# Patient Record
Sex: MALE | Race: WHITE | Employment: FULL TIME | URBAN - METROPOLITAN AREA
[De-identification: names, ages, dates, MRNs, and addresses within clinical notes are randomized per-mention and may not be internally consistent; named-entity substitution may affect disease eponyms.]

---

## 2017-01-12 ENCOUNTER — TELEPHONE (OUTPATIENT)
Dept: FAMILY MEDICINE CLINIC | Age: 52
End: 2017-01-12

## 2017-01-12 NOTE — TELEPHONE ENCOUNTER
Patient came into office asking for the sea sickness patch   Leaving on cruise in     [place] on [address]

## 2017-02-23 ENCOUNTER — OFFICE VISIT (OUTPATIENT)
Dept: FAMILY MEDICINE CLINIC | Age: 52
End: 2017-02-23

## 2017-02-23 VITALS
SYSTOLIC BLOOD PRESSURE: 118 MMHG | BODY MASS INDEX: 37.32 KG/M2 | TEMPERATURE: 98.1 F | HEART RATE: 72 BPM | OXYGEN SATURATION: 100 % | WEIGHT: 266.56 LBS | RESPIRATION RATE: 18 BRPM | DIASTOLIC BLOOD PRESSURE: 78 MMHG | HEIGHT: 71 IN

## 2017-02-23 DIAGNOSIS — M79.644 THUMB PAIN, RIGHT: Primary | ICD-10-CM

## 2017-02-23 RX ORDER — DICLOFENAC SODIUM 10 MG/G
GEL TOPICAL 4 TIMES DAILY
Qty: 100 G | Refills: 1 | Status: ON HOLD | OUTPATIENT
Start: 2017-02-23 | End: 2021-08-05

## 2017-02-23 NOTE — MR AVS SNAPSHOT
Visit Information Date & Time Provider Department Dept. Phone Encounter #  
 2/23/2017  3:45 PM Romario Fenton MD [address] [facility] [phone] [id number] Follow-up Instructions Return if symptoms worsen or fail to improve. Upcoming Health Maintenance Date Due Pneumococcal 19-64 Highest Risk (1 of 3 - PCV13) 3/4/1985 DTaP/Tdap/Td series (1 - Tdap) 3/4/1987 FOBT Q 1 YEAR AGE 50-75 3/4/2016 Allergies as of 2/23/2017  Review Complete On: 2/23/2017 By: Romario Fenton MD  
  
 Severity Noted Reaction Type Reactions Heparin (Bovine)  12/22/2011    Other (comments) Questionable per patient ,back lesions. Current Immunizations  Reviewed on 7/1/2015 Name Date Influenza Vaccine 10/21/2013 Not reviewed this visit You Were Diagnosed With   
  
 Codes Comments Thumb pain, right    -  Primary ICD-10-CM: G28.089 ICD-9-CM: 729.5 Vitals BP  
  
  
  
  
  
 118/78 (BP 1 Location: Left arm, BP Patient Position: Sitting) Vitals History BMI and BSA Data Body Mass Index Body Surface Area  
 37.18 kg/m 2 2.46 m 2 Preferred Pharmacy Pharmacy Name Phone [facility] [address] [place] [address] [phone] Your Updated Medication List  
  
   
This list is accurate as of: 2/23/17  4:39 PM.  Always use your most recent med list.  
  
  
  
  
 clotrimazole-betamethasone topical cream  
Commonly known as:  Cisco Drones Apply  to affected area two (2) times a day. diclofenac 1 % Gel Commonly known as:  VOLTAREN Apply  to affected area four (4) times daily. levocetirizine 5 mg tablet Commonly known as:  Ilda Ala Take 1 Tab by mouth daily. sildenafil citrate 100 mg tablet Commonly known as:  VIAGRA Take 1 Tab by mouth as needed. zolpidem 10 mg tablet Commonly known as:  AMBIEN  
 Take 1 Tab by mouth nightly as needed for Sleep. Ok to fill 11/10/2015 Prescriptions Sent to Pharmacy Refills  
 diclofenac (VOLTAREN) 1 % gel 1 Sig: Apply  to affected area four (4) times daily. Class: Normal  
 Pharmacy: [facility] [address] [place] [address]y [address] [place] [address] #: [phone] Route: Topical  
  
Follow-up Instructions Return if symptoms worsen or fail to improve. Introducing [facility]! Dear Cindy Garibay: Thank you for requesting a [employer] account. Our records indicate that you have previously registered for a [email or web address] account but its currently inactive. Please call our Multi[employer]ona[employer] support line at [phone]. Additional Information If you have questions, please visit the Frequently Asked Questions section of the [email or web address] website at [email or web address] Remember, [email or web address] is NOT to be used for urgent needs. For medical emergencies, dial 911. Now available from your iPhone and Android! Please provide this summary of care documentation to your next provider. Your primary care clinician is listed as GLORIA LOCKETT. If you have any questions after today's visit, please call [phone].

## 2017-02-23 NOTE — PROGRESS NOTES
1. Have you been to the ER, urgent care clinic since your last visit? Hospitalized since your last visit? No    2. Have you seen or consulted any other health care providers outside of the [address] [place] [address] since your last visit? Include any pap smears or colon screening. No   Chief Complaint   Patient presents with    Lesion     dark spots on both feet    Wrist Pain     right wrist pain       Chief Complaint   Patient presents with    Lesion     dark spots on both feet    Wrist Pain     right wrist pain     he is a 49y.o. year old male who presents for evalution. Reviewed PmHx, RxHx, FmHx, SocHx, AllgHx and updated and dated in the chart. Patient Active Problem List    Diagnosis    Insomnia    POEMS syndrome    Neuropathy associated with endocrine disorder       Review of Systems - negative except as listed above in the HPI    Objective:     Vitals:    02/23/17 1624   BP: 118/78   Pulse: 72   Resp: 18   Temp: 98.1 °F (36.7 °C)   TempSrc: Oral   SpO2: 100%   Weight: 266 lb 9 oz (120.9 kg)   Height: 5' 11\" (1.803 m)     Physical Examination: General appearance - alert, well appearing, and in no distress  R base of thumb tender to palp and rom, neg foot exam but for varicose veins      Assessment/ Plan:   Candelaria Cosme was seen today for lesion and wrist pain. Diagnoses and all orders for this visit:    Thumb pain, right  -     diclofenac (VOLTAREN) 1 % gel; Apply  to affected area four (4) times daily. Follow-up Disposition:  Return if symptoms worsen or fail to improve. I have discussed the diagnosis with the patient and the intended plan as seen in the above orders. The patient understands and agrees with the plan. The patient has received an after-visit summary and questions were answered concerning future plans.      Medication Side Effects and Warnings were discussed with patient  Patient Labs were reviewed and or requested:  Patient Past Records were reviewed and or requested    Trish Us gM Mills M.D. There are no Patient Instructions on file for this visit.

## 2017-03-23 ENCOUNTER — DOCUMENTATION ONLY (OUTPATIENT)
Dept: FAMILY MEDICINE CLINIC | Age: 52
End: 2017-03-23

## 2017-03-28 ENCOUNTER — DOCUMENTATION ONLY (OUTPATIENT)
Dept: FAMILY MEDICINE CLINIC | Age: 52
End: 2017-03-28

## 2017-03-28 NOTE — PROGRESS NOTES
PA for diclofenac 1% gel approved from 2/21/17 thru 3/23/18, copy faxed to pharmacy , LM on VM of approval, and copy placed in scan folder to be scanned to chart.

## 2017-05-02 ENCOUNTER — OFFICE VISIT (OUTPATIENT)
Dept: FAMILY MEDICINE CLINIC | Age: 52
End: 2017-05-02

## 2017-05-02 VITALS
HEIGHT: 71 IN | OXYGEN SATURATION: 97 % | TEMPERATURE: 98 F | SYSTOLIC BLOOD PRESSURE: 114 MMHG | DIASTOLIC BLOOD PRESSURE: 73 MMHG | BODY MASS INDEX: 37.8 KG/M2 | WEIGHT: 270 LBS | HEART RATE: 66 BPM | RESPIRATION RATE: 18 BRPM

## 2017-05-02 DIAGNOSIS — R63.5 WEIGHT GAIN: ICD-10-CM

## 2017-05-02 DIAGNOSIS — F98.8 ADD (ATTENTION DEFICIT DISORDER): Primary | ICD-10-CM

## 2017-05-02 RX ORDER — CARBINOXAMINE MALEATE 4 MG/1
4 TABLET ORAL 2 TIMES DAILY
Qty: 60 TAB | Refills: 2 | Status: SHIPPED | OUTPATIENT
Start: 2017-05-02 | End: 2017-05-30 | Stop reason: SDUPTHER

## 2017-05-02 NOTE — PROGRESS NOTES
Patient he for med eval. Dx adhd by [facility],   1. Have you been to the ER, urgent care clinic since your last visit? Hospitalized since your last visit? No    2. Have you seen or consulted any other health care providers outside of the [address] [place] [address] since your last visit? Include any pap smears or colon screening. No       Chief Complaint   Patient presents with    Medication Evaluation     adhd had eval with psychologist Chetna Mcnair  [phone],  [phone] fax     he is a 47y.o. year old male who presents for evalution. Reviewed PmHx, RxHx, FmHx, SocHx, AllgHx and updated and dated in the chart. Patient Active Problem List    Diagnosis    Insomnia    POEMS syndrome    Neuropathy associated with endocrine disorder ([facility] [address]       Review of Systems - negative except as listed above in the HPI    Objective:     Vitals:    05/02/17 1533   BP: 114/73   Pulse: 66   Resp: 18   Temp: 98 °F (36.7 °C)   SpO2: 97%   Weight: 270 lb (122.5 kg)   Height: 5' 11\" (1.803 m)     Physical Examination: General appearance - alert, well appearing, and in no distress    Assessment/ Plan:   Minerva Marks was seen today for medication evaluation. Diagnoses and all orders for this visit:    ADD (attention deficit disorder)  -     Lisdexamfetamine (VYVANSE) 40 mg capsule; Take 1 Cap (40 mg total) by mouth daily. Max Daily Amount: 40 mg  -start rx  -tested at psych    Other orders  -     carbinoxamine maleate (PALGIC) 4 mg tab; Take 4 mg by mouth two (2) times a day. Follow-up Disposition:  Return in about 1 month (around 6/2/2017) for add. I have discussed the diagnosis with the patient and the intended plan as seen in the above orders. The patient understands and agrees with the plan. The patient has received an after-visit summary and questions were answered concerning future plans.      Medication Side Effects and Warnings were discussed with patient  Patient Labs were reviewed and or requested:  Patient Past Records were reviewed and or requested    Aubrey Pyle M.D. There are no Patient Instructions on file for this visit.

## 2017-05-02 NOTE — MR AVS SNAPSHOT
Visit Information Date & Time Provider Department Dept. Phone Encounter #  
 5/2/2017  3:35 PM Carolyn Candelario MD [address] [facility] [phone] [id number] Follow-up Instructions Return in about 1 month (around 6/2/2017) for add. Upcoming Health Maintenance Date Due Pneumococcal 19-64 Highest Risk (1 of 3 - PCV13) 3/4/1985 DTaP/Tdap/Td series (1 - Tdap) 3/4/1987 FOBT Q 1 YEAR AGE 50-75 3/4/2016 INFLUENZA AGE 9 TO ADULT 8/1/2017 Allergies as of 5/2/2017  Review Complete On: 5/2/2017 By: Carolyn Candelario MD  
  
 Severity Noted Reaction Type Reactions Heparin (Bovine)  12/22/2011    Other (comments) Questionable per patient ,back lesions. Current Immunizations  Reviewed on 7/1/2015 Name Date Influenza Vaccine 10/21/2013 Not reviewed this visit You Were Diagnosed With   
  
 Codes Comments ADD (attention deficit disorder)    -  Primary ICD-10-CM: F98.8 ICD-9-CM: 314.00 Vitals BP Pulse Temp Resp Height(growth percentile) Weight(growth percentile) 114/73 66 98 °F (36.7 °C) 18 5' 11\" (1.803 m) 270 lb (122.5 kg) SpO2 BMI Smoking Status 97% 37.66 kg/m2 Never Smoker Vitals History BMI and BSA Data Body Mass Index Body Surface Area  
 37.66 kg/m 2 2.48 m 2 Preferred Pharmacy Pharmacy Name Phone [facility] [address] [place] [address], [address] [place] [address] [phone] Your Updated Medication List  
  
   
This list is accurate as of: 5/2/17  3:47 PM.  Always use your most recent med list.  
  
  
  
  
 carbinoxamine maleate 4 mg Tab Commonly known as:  Crow Willett 157 Take 4 mg by mouth two (2) times a day. clotrimazole-betamethasone topical cream  
Commonly known as:  Oral Mage Apply  to affected area two (2) times a day. diclofenac 1 % Gel Commonly known as:  VOLTAREN Apply  to affected area four (4) times daily. levocetirizine 5 mg tablet Commonly known as:  Florentino Tonny Take 1 Tab by mouth daily. Lisdexamfetamine 40 mg capsule Commonly known as:  VYVANSE Take 1 Cap (40 mg total) by mouth daily. Max Daily Amount: 40 mg  
  
 sildenafil citrate 100 mg tablet Commonly known as:  VIAGRA Take 1 Tab by mouth as needed. zolpidem 10 mg tablet Commonly known as:  AMBIEN Take 1 Tab by mouth nightly as needed for Sleep. Ok to fill 11/10/2015 Prescriptions Printed Refills Lisdexamfetamine (VYVANSE) 40 mg capsule 0 Sig: Take 1 Cap (40 mg total) by mouth daily. Max Daily Amount: 40 mg  
 Class: Print Route: Oral  
  
Prescriptions Sent to Pharmacy Refills  
 carbinoxamine maleate (PALGIC) 4 mg tab 2 Sig: Take 4 mg by mouth two (2) times a day. Class: Normal  
 Pharmacy: [facility] [address] [place] [address] [address] [place] [address] #: [phone] Route: Oral  
  
Follow-up Instructions Return in about 1 month (around 6/2/2017) for add. Introducing [facility]! Dear Lena Manuel: Thank you for requesting a Cleverbug account. Our records indicate that you have previously registered for a Cleverbug account but its currently inactive. Please call our Cleverbug support line at [phone]. Additional Information If you have questions, please visit the Frequently Asked Questions section of the Cleverbug website at [email or web address] Campaign [email or web address]/The Campaign [email or web address]/. Remember, Cleverbug is NOT to be used for urgent needs. For medical emergencies, dial 911. Now available from your iPhone and Android! Please provide this summary of care documentation to your next provider. Your primary care clinician is listed as GLORIA LOCKETT. If you have any questions after today's visit, please call [phone].

## 2017-05-03 ENCOUNTER — TELEPHONE (OUTPATIENT)
Dept: FAMILY MEDICINE CLINIC | Age: 52
End: 2017-05-03

## 2017-05-03 LAB
ALBUMIN SERPL-MCNC: 3.5 G/DL (ref 3.5–5.5)
ALBUMIN/GLOB SERPL: 1.4 {RATIO} (ref 1.2–2.2)
ALP SERPL-CCNC: 94 IU/L (ref 39–117)
ALT SERPL-CCNC: 22 IU/L (ref 0–44)
AST SERPL-CCNC: 25 IU/L (ref 0–40)
BASOPHILS # BLD AUTO: 0 X10E3/UL (ref 0–0.2)
BASOPHILS NFR BLD AUTO: 0 %
BILIRUB SERPL-MCNC: 0.7 MG/DL (ref 0–1.2)
BUN SERPL-MCNC: 14 MG/DL (ref 6–24)
BUN/CREAT SERPL: 17 (ref 9–20)
CALCIUM SERPL-MCNC: 8.7 MG/DL (ref 8.7–10.2)
CHLORIDE SERPL-SCNC: 104 MMOL/L (ref 96–106)
CO2 SERPL-SCNC: 24 MMOL/L (ref 18–29)
CREAT SERPL-MCNC: 0.83 MG/DL (ref 0.76–1.27)
EOSINOPHIL # BLD AUTO: 0.7 X10E3/UL (ref 0–0.4)
EOSINOPHIL NFR BLD AUTO: 10 %
ERYTHROCYTE [DISTWIDTH] IN BLOOD BY AUTOMATED COUNT: 15.2 % (ref 12.3–15.4)
GLOBULIN SER CALC-MCNC: 2.5 G/DL (ref 1.5–4.5)
GLUCOSE SERPL-MCNC: 124 MG/DL (ref 65–99)
HCT VFR BLD AUTO: 44.9 % (ref 37.5–51)
HGB BLD-MCNC: 15.3 G/DL (ref 12.6–17.7)
IMM GRANULOCYTES # BLD: 0 X10E3/UL (ref 0–0.1)
IMM GRANULOCYTES NFR BLD: 0 %
LYMPHOCYTES # BLD AUTO: 2.6 X10E3/UL (ref 0.7–3.1)
LYMPHOCYTES NFR BLD AUTO: 36 %
MCH RBC QN AUTO: 28.3 PG (ref 26.6–33)
MCHC RBC AUTO-ENTMCNC: 34.1 G/DL (ref 31.5–35.7)
MCV RBC AUTO: 83 FL (ref 79–97)
MONOCYTES # BLD AUTO: 0.7 X10E3/UL (ref 0.1–0.9)
MONOCYTES NFR BLD AUTO: 9 %
NEUTROPHILS # BLD AUTO: 3.2 X10E3/UL (ref 1.4–7)
NEUTROPHILS NFR BLD AUTO: 45 %
PLATELET # BLD AUTO: 167 X10E3/UL (ref 150–379)
POTASSIUM SERPL-SCNC: 4 MMOL/L (ref 3.5–5.2)
PROT SERPL-MCNC: 6 G/DL (ref 6–8.5)
RBC # BLD AUTO: 5.41 X10E6/UL (ref 4.14–5.8)
SODIUM SERPL-SCNC: 143 MMOL/L (ref 134–144)
TSH SERPL DL<=0.005 MIU/L-ACNC: 1.04 UIU/ML (ref 0.45–4.5)
WBC # BLD AUTO: 7.2 X10E3/UL (ref 3.4–10.8)

## 2017-05-03 NOTE — TELEPHONE ENCOUNTER
PA for Vyvanse 40 approved called pharmacy - informed $776 toward $1000 deductible- pt informed - voiced understanding

## 2017-05-04 NOTE — PROGRESS NOTES
Pt called at the number on file with no answer. Voicemail could not be left with information to call the clinic back, because there was no voicemail set up.

## 2017-05-30 ENCOUNTER — OFFICE VISIT (OUTPATIENT)
Dept: FAMILY MEDICINE CLINIC | Age: 52
End: 2017-05-30

## 2017-05-30 VITALS
OXYGEN SATURATION: 98 % | WEIGHT: 270.06 LBS | HEART RATE: 66 BPM | TEMPERATURE: 98.1 F | DIASTOLIC BLOOD PRESSURE: 79 MMHG | HEIGHT: 71 IN | RESPIRATION RATE: 20 BRPM | SYSTOLIC BLOOD PRESSURE: 112 MMHG | BODY MASS INDEX: 37.81 KG/M2

## 2017-05-30 DIAGNOSIS — D22.9 ATYPICAL MOLE: Primary | ICD-10-CM

## 2017-05-30 DIAGNOSIS — F98.8 ADD (ATTENTION DEFICIT DISORDER): ICD-10-CM

## 2017-05-30 RX ORDER — CARBINOXAMINE MALEATE 4 MG/1
4 TABLET ORAL 2 TIMES DAILY
Qty: 180 TAB | Refills: 3 | Status: SHIPPED | OUTPATIENT
Start: 2017-05-30 | End: 2019-11-27 | Stop reason: SDUPTHER

## 2017-05-30 RX ORDER — METHYLPHENIDATE HYDROCHLORIDE 40 MG/1
40 CAPSULE, EXTENDED RELEASE ORAL
Qty: 30 CAP | Refills: 0 | Status: SHIPPED | OUTPATIENT
Start: 2017-05-30 | End: 2017-07-05 | Stop reason: SDUPTHER

## 2017-05-30 NOTE — PROGRESS NOTES
1. Have you been to the ER, urgent care clinic since your last visit? Hospitalized since your last visit? No    2. Have you seen or consulted any other health care providers outside of the [facility] since your last visit? Include any pap smears or colon screening. No   Chief Complaint   Patient presents with    Attention Deficit Disorder     ADD -taking Vyvanse 40 mg (dry mouth)       Has mole on nose not healing for 1 year      Chief Complaint   Patient presents with    Attention Deficit Disorder     ADD -taking Vyvanse 40 mg (dry mouth)     he is a 47y.o. year old male who presents for evalution. Reviewed PmHx, RxHx, FmHx, SocHx, AllgHx and updated and dated in the chart. Patient Active Problem List    Diagnosis    Insomnia    POEMS syndrome    Neuropathy associated with endocrine disorder ([facility] [address]       Review of Systems - negative except as listed above in the HPI    Objective:     Vitals:    05/30/17 1630   BP: 112/79   Pulse: 66   Resp: 20   Temp: 98.1 °F (36.7 °C)   TempSrc: Oral   SpO2: 98%   Weight: 270 lb 1 oz (122.5 kg)   Height: 5' 11\" (1.803 m)     Physical Examination: General appearance - alert, well appearing, and in no distress  Chest - clear to auscultation, no wheezes, rales or rhonchi, symmetric air entry  Heart - normal rate, regular rhythm, normal S1, S2, no murmurs, rubs, clicks or gallops  Skin - left nose with non healing sore    Assessment/ Plan:   Ugo Morocho was seen today for attention deficit disorder. Diagnoses and all orders for this visit:    Atypical mole  -     REFERRAL TO DERMATOLOGY    ADD (attention deficit disorder)  -dc Vyvnase  -add rx    Other orders  -     carbinoxamine maleate (PALGIC) 4 mg tab; Take 4 mg by mouth two (2) times a day. -     methylphenidate (METADATE CD) 40 mg ER capsule; Take 40 mg by mouth every morning. Max Daily Amount: 40 mg       Follow-up Disposition:  Return in about 1 month (around 6/30/2017) for add.     I have discussed the diagnosis with the patient and the intended plan as seen in the above orders. The patient understands and agrees with the plan. The patient has received an after-visit summary and questions were answered concerning future plans. Medication Side Effects and Warnings were discussed with patient  Patient Labs were reviewed and or requested:  Patient Past Records were reviewed and or requested    Conrado Young M.D. There are no Patient Instructions on file for this visit.

## 2017-06-05 ENCOUNTER — TELEPHONE (OUTPATIENT)
Dept: FAMILY MEDICINE CLINIC | Age: 52
End: 2017-06-05

## 2017-06-06 ENCOUNTER — DOCUMENTATION ONLY (OUTPATIENT)
Dept: FAMILY MEDICINE CLINIC | Age: 52
End: 2017-06-06

## 2017-06-06 NOTE — PROGRESS NOTES
BZOMSD:[id number];[facility] Name:ST: ADHD Agents (Adderall IR/XR, Aptensio XR,Concerta,Daytrana,Desoxyn,Dexedrine,Dyanavel XR,Evekeo, Focalin IR/XR, Metadate CD/ER, Methylin, Procentra, Quillivant XR, QuilliChew ER, RitalinIR/LA/SR, Vyvanse, Kapvay, Intuniv, Strattera, etc) PA MIGUEL A;Status:Approved; Coverage Start Date:05/07/2017;Coverage End Date:06/05/2020;   Pharmacy informed & LM on VM of patient of approval

## 2017-07-05 ENCOUNTER — OFFICE VISIT (OUTPATIENT)
Dept: FAMILY MEDICINE CLINIC | Age: 52
End: 2017-07-05

## 2017-07-05 VITALS
OXYGEN SATURATION: 97 % | WEIGHT: 268 LBS | BODY MASS INDEX: 37.52 KG/M2 | RESPIRATION RATE: 20 BRPM | TEMPERATURE: 97.8 F | DIASTOLIC BLOOD PRESSURE: 81 MMHG | HEIGHT: 71 IN | HEART RATE: 70 BPM | SYSTOLIC BLOOD PRESSURE: 123 MMHG

## 2017-07-05 DIAGNOSIS — R73.09 ELEVATED GLUCOSE: Primary | ICD-10-CM

## 2017-07-05 DIAGNOSIS — F98.8 ADD (ATTENTION DEFICIT DISORDER): ICD-10-CM

## 2017-07-05 RX ORDER — METHYLPHENIDATE HYDROCHLORIDE 40 MG/1
40 CAPSULE, EXTENDED RELEASE ORAL
Qty: 30 CAP | Refills: 0 | Status: SHIPPED | OUTPATIENT
Start: 2017-07-05 | End: 2017-10-16 | Stop reason: SDUPTHER

## 2017-07-05 RX ORDER — METHYLPHENIDATE HYDROCHLORIDE 40 MG/1
40 CAPSULE, EXTENDED RELEASE ORAL
Qty: 30 CAP | Refills: 0 | Status: SHIPPED | OUTPATIENT
Start: 2017-07-05 | End: 2017-07-05 | Stop reason: SDUPTHER

## 2017-07-05 NOTE — PROGRESS NOTES
Patient here for med eval./ refill. Patient also f/u with elevated glucose. He has been getting small headaches recently. 1. Have you been to the ER, urgent care clinic since your last visit? Hospitalized since your last visit? No    2. Have you seen or consulted any other health care providers outside of the [address] [place] [address] since your last visit? Include any pap smears or colon screening. No         Chief Complaint   Patient presents with    Medication Evaluation    Blood sugar problem     elevated glucose     he is a 47y.o. year old male who presents for evalution. Reviewed PmHx, RxHx, FmHx, SocHx, AllgHx and updated and dated in the chart. Patient Active Problem List    Diagnosis    ADD (attention deficit disorder)    Insomnia    POEMS syndrome    Neuropathy associated with endocrine disorder ([facility] [address]       Review of Systems - negative except as listed above in the HPI    Objective:     Vitals:    07/05/17 1542   BP: 123/81   Pulse: 70   Resp: 20   Temp: 97.8 °F (36.6 °C)   SpO2: 97%   Weight: 268 lb (121.6 kg)   Height: 5' 11\" (1.803 m)     Physical Examination: General appearance - alert, well appearing, and in no distress  Chest - clear to auscultation, no wheezes, rales or rhonchi, symmetric air entry  Heart - normal rate, regular rhythm, normal S1, S2, no murmurs, rubs, clicks or gallops    Assessment/ Plan:   David Duckworth was seen today for medication evaluation and blood sugar problem. Diagnoses and all orders for this visit:    Elevated glucose  -     AMB POC HEMOGLOBIN A1C  -nl    ADD (attention deficit disorder)  -ok with rx    Other orders  -     methylphenidate (METADATE CD) 40 mg ER capsule; Take 40 mg by mouth every morning. Max Daily Amount: 40 mg  -     methylphenidate (METADATE CD) 40 mg ER capsule; Take 40 mg by mouth every morning. Max Daily Amount: 40 mg  -     methylphenidate (METADATE CD) 40 mg ER capsule; Take 40 mg by mouth every morning.   Max Daily Amount: 40 mg Follow-up Disposition:  Return if symptoms worsen or fail to improve. I have discussed the diagnosis with the patient and the intended plan as seen in the above orders. The patient understands and agrees with the plan. The patient has received an after-visit summary and questions were answered concerning future plans. Medication Side Effects and Warnings were discussed with patient  Patient Labs were reviewed and or requested:  Patient Past Records were reviewed and or requested    Ceci Torres M.D. There are no Patient Instructions on file for this visit.

## 2017-07-05 NOTE — MR AVS SNAPSHOT
Visit Information Date & Time Provider Department Dept. Phone Encounter #  
 7/5/2017  3:00 PM Tom Trujillo MD [address] [facility] [phone] [id number] Follow-up Instructions Return if symptoms worsen or fail to improve. Upcoming Health Maintenance Date Due Pneumococcal 19-64 Highest Risk (1 of 3 - PCV13) 3/4/1985 DTaP/Tdap/Td series (1 - Tdap) 3/4/1987 FOBT Q 1 YEAR AGE 50-75 3/4/2016 INFLUENZA AGE 9 TO ADULT 8/1/2017 Allergies as of 7/5/2017  Review Complete On: 7/5/2017 By: Tom Trujillo MD  
  
 Severity Noted Reaction Type Reactions Heparin (Bovine)  12/22/2011    Other (comments) Questionable per patient ,back lesions. Current Immunizations  Reviewed on 7/1/2015 Name Date Influenza Vaccine 10/21/2013 Not reviewed this visit You Were Diagnosed With   
  
 Codes Comments Elevated glucose    -  Primary ICD-10-CM: R73.09 
ICD-9-CM: 790.29 ADD (attention deficit disorder)     ICD-10-CM: F98.8 ICD-9-CM: 314.00 Vitals BP Pulse Temp Resp Height(growth percentile) Weight(growth percentile) 123/81 70 97.8 °F (36.6 °C) 20 5' 11\" (1.803 m) 268 lb (121.6 kg) SpO2 BMI Smoking Status 97% 37.38 kg/m2 Never Smoker BMI and BSA Data Body Mass Index Body Surface Area  
 37.38 kg/m 2 2.47 m 2 Preferred Pharmacy Pharmacy Name Phone [facility] [address] [place] [address] [phone] Your Updated Medication List  
  
   
This list is accurate as of: 7/5/17  3:56 PM.  Always use your most recent med list.  
  
  
  
  
 carbinoxamine maleate 4 mg Tab Commonly known as:  Crow Willett 157 Take 4 mg by mouth two (2) times a day. clotrimazole-betamethasone topical cream  
Commonly known as:  Irven Alisson Apply  to affected area two (2) times a day. diclofenac 1 % Gel Commonly known as:  VOLTAREN  
 Apply  to affected area four (4) times daily. * methylphenidate 40 mg ER capsule Commonly known as:  METADATE CD Take 40 mg by mouth every morning. Max Daily Amount: 40 mg  
  
 * methylphenidate 40 mg ER capsule Commonly known as:  METADATE CD Take 40 mg by mouth every morning. Max Daily Amount: 40 mg  
  
 sildenafil citrate 100 mg tablet Commonly known as:  VIAGRA Take 1 Tab by mouth as needed. zolpidem 10 mg tablet Commonly known as:  AMBIEN Take 1 Tab by mouth nightly as needed for Sleep. Ok to fill 11/10/2015 * Notice: This list has 2 medication(s) that are the same as other medications prescribed for you. Read the directions carefully, and ask your doctor or other care provider to review them with you. Prescriptions Printed Refills  
 methylphenidate (METADATE CD) 40 mg ER capsule 0 Sig: Take 40 mg by mouth every morning. Max Daily Amount: 40 mg  
 Class: Print Route: Oral  
 methylphenidate (METADATE CD) 40 mg ER capsule 0 Sig: Take 40 mg by mouth every morning. Max Daily Amount: 40 mg  
 Class: Print Route: Oral  
  
We Performed the Following AMB POC HEMOGLOBIN A1C [25109 CPT(R)] Follow-up Instructions Return if symptoms worsen or fail to improve. Introducing [facility]! Dayna Boogie introduces "[employer]" patient portal. Now you can access parts of your medical record, email your doctor's office, and request medication refills online. 1. In your internet browser, go to [email or web address] 2. Click on the First Time User? Click Here link in the Sign In box. You will see the New Member Sign Up page. 3. Enter your "[employer]" Access Code exactly as it appears below. You will not need to use this code after youve completed the sign-up process. If you do not sign up before the expiration date, you must request a new code. · "[employer]" Access Code: [email or web address] Expires: 9/4/2017  9:24 AM 
 
 4. Enter the last four digits of your Social Security Number (xxxx) and Date of Birth (mm/dd/yyyy) as indicated and click Submit. You will be taken to the next sign-up page. 5. Create a Cellectis ID. This will be your [employer] login ID and cannot be changed, so think of one that is secure and easy to remember. 6. Create a Cel[employer] password. You can change your password at any time. 7. Enter your Password Reset Question and Answer. This can be used at a later time if you forget your password. 8. Enter your e-mail address. You will receive e-mail notification when new information is available in [address] 9. Click Sign Up. You can now view and download portions of your medical record. 10. Click the Download Summary menu link to download a portable copy of your medical information. If you have questions, please visit the Frequently Asked Questions section of the [employer] website. Remember, [employer] is NOT to be used for urgent needs. For medical emergencies, dial 911. Now available from your iPhone and Android! Please provide this summary of care documentation to your next provider. Your primary care clinician is listed as GLORIA LOCKETT. If you have any questions after today's visit, please call [phone].

## 2017-10-16 ENCOUNTER — OFFICE VISIT (OUTPATIENT)
Dept: FAMILY MEDICINE CLINIC | Age: 52
End: 2017-10-16

## 2017-10-16 VITALS
HEART RATE: 74 BPM | TEMPERATURE: 97.8 F | HEIGHT: 71 IN | RESPIRATION RATE: 20 BRPM | BODY MASS INDEX: 37.66 KG/M2 | SYSTOLIC BLOOD PRESSURE: 114 MMHG | DIASTOLIC BLOOD PRESSURE: 76 MMHG | WEIGHT: 269 LBS | OXYGEN SATURATION: 97 %

## 2017-10-16 DIAGNOSIS — F98.8 ATTENTION DEFICIT DISORDER, UNSPECIFIED HYPERACTIVITY PRESENCE: Primary | ICD-10-CM

## 2017-10-16 DIAGNOSIS — F51.01 PRIMARY INSOMNIA: ICD-10-CM

## 2017-10-16 RX ORDER — METHYLPHENIDATE HYDROCHLORIDE 40 MG/1
40 CAPSULE, EXTENDED RELEASE ORAL
Qty: 30 CAP | Refills: 0 | Status: SHIPPED | OUTPATIENT
Start: 2017-10-16 | End: 2017-10-16 | Stop reason: SDUPTHER

## 2017-10-16 RX ORDER — METHYLPHENIDATE HYDROCHLORIDE 40 MG/1
40 CAPSULE, EXTENDED RELEASE ORAL
Qty: 30 CAP | Refills: 0 | Status: SHIPPED | OUTPATIENT
Start: 2017-10-16 | End: 2019-05-01 | Stop reason: SDUPTHER

## 2017-10-16 RX ORDER — METHYLPHENIDATE HYDROCHLORIDE 40 MG/1
40 CAPSULE, EXTENDED RELEASE ORAL
Qty: 30 CAP | Refills: 0 | Status: SHIPPED | OUTPATIENT
Start: 2017-10-16 | End: 2019-11-27 | Stop reason: SDUPTHER

## 2017-10-16 RX ORDER — PANTOPRAZOLE SODIUM 40 MG/1
40 TABLET, DELAYED RELEASE ORAL DAILY
Qty: 30 TAB | Refills: 5 | Status: SHIPPED | OUTPATIENT
Start: 2017-10-16 | End: 2019-11-27 | Stop reason: SDUPTHER

## 2017-10-16 NOTE — MR AVS SNAPSHOT
Visit Information Date & Time Provider Department Dept. Phone Encounter #  
 10/16/2017  2:50 PM Jessica Cruz MD [address] [facility] [phone] [id number] Follow-up Instructions Return in about 3 months (around 1/16/2018) for add. Upcoming Health Maintenance Date Due Pneumococcal 19-64 Highest Risk (1 of 3 - PCV13) 3/4/1985 DTaP/Tdap/Td series (1 - Tdap) 3/4/1987 FOBT Q 1 YEAR AGE 50-75 3/4/2016 INFLUENZA AGE 9 TO ADULT 8/1/2017 Allergies as of 10/16/2017  Review Complete On: 10/16/2017 By: Jessica Cruz MD  
  
 Severity Noted Reaction Type Reactions Heparin (Bovine)  12/22/2011    Other (comments) Questionable per patient ,back lesions. Current Immunizations  Reviewed on 7/1/2015 Name Date Influenza Vaccine 10/21/2013 Not reviewed this visit You Were Diagnosed With   
  
 Codes Comments Attention deficit disorder, unspecified hyperactivity presence    -  Primary ICD-10-CM: F98.8 ICD-9-CM: 314.00 Primary insomnia     ICD-10-CM: F51.01 
ICD-9-CM: 307.42 Vitals BP Pulse Temp Resp Height(growth percentile) Weight(growth percentile) 114/76 74 97.8 °F (36.6 °C) 20 5' 11\" (1.803 m) 269 lb (122 kg) SpO2 BMI Smoking Status 97% 37.52 kg/m2 Never Smoker Vitals History BMI and BSA Data Body Mass Index Body Surface Area  
 37.52 kg/m 2 2.47 m 2 Preferred Pharmacy Pharmacy Name Phone [facility] [address] [place] [address], [address] [place] [address] [phone] Your Updated Medication List  
  
   
This list is accurate as of: 10/16/17  3:17 PM.  Always use your most recent med list.  
  
  
  
  
 carbinoxamine maleate 4 mg Tab Commonly known as:  Crow Willett 157 Take 4 mg by mouth two (2) times a day. clotrimazole-betamethasone topical cream  
Commonly known as:  Pati Mazariegos Apply  to affected area two (2) times a day. diclofenac 1 % Gel Commonly known as:  VOLTAREN Apply  to affected area four (4) times daily. * methylphenidate HCl 40 mg ER capsule Commonly known as:  METADATE CD Take 40 mg by mouth every morning. Max Daily Amount: 40 mg  
  
 * methylphenidate HCl 40 mg ER capsule Commonly known as:  METADATE CD Take 40 mg by mouth every morning. Max Daily Amount: 40 mg  
  
 pantoprazole 40 mg tablet Commonly known as:  PROTONIX Take 1 Tab by mouth daily. sildenafil citrate 100 mg tablet Commonly known as:  VIAGRA Take 1 Tab by mouth as needed. zolpidem 10 mg tablet Commonly known as:  AMBIEN Take 1 Tab by mouth nightly as needed for Sleep. Ok to fill 11/10/2015 * Notice: This list has 2 medication(s) that are the same as other medications prescribed for you. Read the directions carefully, and ask your doctor or other care provider to review them with you. Prescriptions Printed Refills  
 methylphenidate HCl (METADATE CD) 40 mg ER capsule 0 Sig: Take 40 mg by mouth every morning. Max Daily Amount: 40 mg  
 Class: Print Route: Oral  
 methylphenidate HCl (METADATE CD) 40 mg ER capsule 0 Sig: Take 40 mg by mouth every morning. Max Daily Amount: 40 mg  
 Class: Print Route: Oral  
  
Prescriptions Sent to Pharmacy Refills  
 pantoprazole (PROTONIX) 40 mg tablet 5 Sig: Take 1 Tab by mouth daily. Class: Normal  
 Pharmacy: [facility] [address] [place] [address] [address] [place] [address] #: [phone] Route: Oral  
  
Follow-up Instructions Return in about 3 months (around 1/16/2018) for add. Introducing [facility] & HEALTH SERVICES! [facility] introduces [employer] patient portal. Now you can access parts of your medical record, email your doctor's office, and request medication refills online. 1. In your internet browser, go to [email or web address] 2. Click on the First Time User? Click Here link in the Sign In box. You will see the New Member Sign Up page. 3. Enter your [employer]gent [employer] Access Code exactly as it appears below. You will not need to use this code after youve completed the sign-up process. If you do not sign up before the expiration date, you must request a new code. · [employer] Access Code: [email or web address] Expires: 1/14/2018  3:17 PM 
 
4. Enter the last four digits of your Social Security Number (xxxx) and Date of Birth (mm/dd/yyyy) as indicated and click Submit. You will be taken to the next sign-up page. 5. Create a Urgent Career ID. This will be your Urgent Career login ID and cannot be changed, so think of one that is secure and easy to remember. 6. Create a [employer]gent [employer] password. You can change your password at any time. 7. Enter your Password Reset Question and Answer. This can be used at a later time if you forget your password. 8. Enter your e-mail address. You will receive e-mail notification when new information is available in [address] 9. Click Sign Up. You can now view and download portions of your medical record. 10. Click the Download Summary menu link to download a portable copy of your medical information. If you have questions, please visit the Frequently Asked Questions section of the Urgent Career website. Remember, Urgent Career is NOT to be used for urgent needs. For medical emergencies, dial 911. Now available from your iPhone and Android! Please provide this summary of care documentation to your next provider. Your primary care clinician is listed as GLORIA LOCKETT. If you have any questions after today's visit, please call [phone].

## 2017-10-16 NOTE — LETTER
10/16/2017 3:14 PM 
 
Mr. Carey Puente [address] Mr Sasha Harrell takes Ambien for insomnia and has been taking it for over 5 years and has been doing well with the medication and is deemed to be safe. Mr Sasha Harrell also takes Metadate for ADHD and is tolerating the medication and doing well. Sincerely, Jessica Cruz MD

## 2017-10-16 NOTE — PROGRESS NOTES
Patient here for med refill. Patient also c/o acid reflux. 1. Have you been to the ER, urgent care clinic since your last visit? Hospitalized since your last visit? No    2. Have you seen or consulted any other health care providers outside of the [address] [place] [address] since your last visit? Include any pap smears or colon screening. No         Chief Complaint   Patient presents with    Medication Refill    Other     paperwork    Epigastric Pain     He is a 47 y.o. male who presents for evalution. Reviewed PmHx, RxHx, FmHx, SocHx, AllgHx and updated and dated in the chart. Patient Active Problem List    Diagnosis    ADD (attention deficit disorder)    Insomnia    POEMS syndrome    Neuropathy associated with endocrine disorder ([facility] [address]       Review of Systems - negative except as listed above in the HPI    Objective:     Vitals:    10/16/17 1456   BP: 114/76   Pulse: 74   Resp: 20   Temp: 97.8 °F (36.6 °C)   SpO2: 97%   Weight: 269 lb (122 kg)   Height: 5' 11\" (1.803 m)     Physical Examination: General appearance - alert, well appearing, and in no distress    Assessment/ Plan:   Diagnoses and all orders for this visit:    1. Attention deficit disorder, unspecified hyperactivity presence  -     methylphenidate HCl (METADATE CD) 40 mg ER capsule; Take 40 mg by mouth every morning. Max Daily Amount: 40 mg  -3 fills  -good with rx    2. Primary insomnia  -stable on rx  -letter written for work    Other orders  -     methylphenidate HCl (METADATE CD) 40 mg ER capsule; Take 40 mg by mouth every morning. Max Daily Amount: 40 mg  -     pantoprazole (PROTONIX) 40 mg tablet; Take 1 Tab by mouth daily. Follow-up Disposition:  Return in about 3 months (around 1/16/2018) for add. I have discussed the diagnosis with the patient and the intended plan as seen in the above orders. The patient understands and agrees with the plan.  The patient has received an after-visit summary and questions were answered concerning future plans. Medication Side Effects and Warnings were discussed with patient  Patient Labs were reviewed and or requested:  Patient Past Records were reviewed and or requested    Karena Osman M.D. There are no Patient Instructions on file for this visit.

## 2017-10-30 ENCOUNTER — OFFICE VISIT (OUTPATIENT)
Dept: FAMILY MEDICINE CLINIC | Age: 52
End: 2017-10-30

## 2017-10-30 VITALS
OXYGEN SATURATION: 97 % | HEART RATE: 74 BPM | WEIGHT: 272 LBS | BODY MASS INDEX: 38.08 KG/M2 | HEIGHT: 71 IN | SYSTOLIC BLOOD PRESSURE: 128 MMHG | TEMPERATURE: 98.4 F | DIASTOLIC BLOOD PRESSURE: 83 MMHG | RESPIRATION RATE: 20 BRPM

## 2017-10-30 DIAGNOSIS — R21 RASH: Primary | ICD-10-CM

## 2017-10-30 RX ORDER — SULFAMETHOXAZOLE AND TRIMETHOPRIM 800; 160 MG/1; MG/1
1 TABLET ORAL 2 TIMES DAILY
Qty: 20 TAB | Refills: 0 | Status: SHIPPED | OUTPATIENT
Start: 2017-10-30 | End: 2017-11-09

## 2018-04-18 NOTE — MR AVS SNAPSHOT
Cataract Surgery Versus Waiting Counseling: I have discussed the option of glasses versus cataract surgery versus following, It was explained that when vision no longer meets the patient's visual needs and a new prescription for glasses is not likely to improve the patient's visual symptoms, the option of cataract surgery is a reasonable next step. It was explained that there is no guarantee that removing the cataract will improve their visual symptoms; however, it is believed that the cataract is contributing to the patient's visual impairment and surgery may noticeably improve both the visual and functional status of the patient. After this discussion, the patient wishes to wait at this time. Patient will call back if they decide to proceed with cataract surgery. Visit Information Date & Time Provider Department Dept. Phone Encounter #  
 5/30/2017  3:35 PM Sae Valentine MD [address] [phone] [id number] Follow-up Instructions Return in about 1 month (around 6/30/2017) for add. Upcoming Health Maintenance Date Due Pneumococcal 19-64 Highest Risk (1 of 3 - PCV13) 3/4/1985 DTaP/Tdap/Td series (1 - Tdap) 3/4/1987 FOBT Q 1 YEAR AGE 50-75 3/4/2016 INFLUENZA AGE 9 TO ADULT 8/1/2017 Allergies as of 5/30/2017  Review Complete On: 5/30/2017 By: Sae Valentine MD  
  
 Severity Noted Reaction Type Reactions Heparin (Bovine)  12/22/2011    Other (comments) Questionable per patient ,back lesions. Current Immunizations  Reviewed on 7/1/2015 Name Date Influenza Vaccine 10/21/2013 Not reviewed this visit You Were Diagnosed With   
  
 Codes Comments Atypical mole    -  Primary ICD-10-CM: D22.9 ICD-9-CM: 216.9 ADD (attention deficit disorder)     ICD-10-CM: F98.8 ICD-9-CM: 314.00 Vitals BP Pulse Temp Resp Height(growth percentile) Weight(growth percentile) 112/79 (BP 1 Location: Left arm, BP Patient Position: Sitting) 66 98.1 °F (36.7 °C) (Oral) 20 5' 11\" (1.803 m) 270 lb 1 oz (122.5 kg) SpO2 BMI Smoking Status 98% 37.67 kg/m2 Never Smoker Vitals History BMI and BSA Data Body Mass Index Body Surface Area  
 37.67 kg/m 2 2.48 m 2 Preferred Pharmacy Pharmacy Name Phone [facility] [address] [place] [address], [address] [place] [address] [phone] Your Updated Medication List  
  
   
This list is accurate as of: 5/30/17  4:41 PM.  Always use your most recent med list.  
  
  
  
  
 carbinoxamine maleate 4 mg Tab Commonly known as:  Crow Willett 157 Take 4 mg by mouth two (2) times a day.   
  
 clotrimazole-betamethasone topical cream  
Commonly known as:  Julio Kumar  
 Apply  to affected area two (2) times a day. diclofenac 1 % Gel Commonly known as:  VOLTAREN Apply  to affected area four (4) times daily. methylphenidate 40 mg ER capsule Commonly known as:  METADATE CD Take 40 mg by mouth every morning. Max Daily Amount: 40 mg  
  
 sildenafil citrate 100 mg tablet Commonly known as:  VIAGRA Take 1 Tab by mouth as needed. zolpidem 10 mg tablet Commonly known as:  AMBIEN Take 1 Tab by mouth nightly as needed for Sleep. Ok to fill 11/10/2015 Prescriptions Printed Refills  
 methylphenidate (METADATE CD) 40 mg ER capsule 0 Sig: Take 40 mg by mouth every morning. Max Daily Amount: 40 mg  
 Class: Print Route: Oral  
  
Prescriptions Sent to Pharmacy Refills  
 carbinoxamine maleate (PALGIC) 4 mg tab 3 Sig: Take 4 mg by mouth two (2) times a day. Class: Normal  
 Pharmacy: [address], [address] Ph #: [phone] Route: Oral  
  
We Performed the Following REFERRAL TO DERMATOLOGY [REF19 Custom] Follow-up Instructions Return in about 1 month (around 6/30/2017) for add. Referral Information Referral ID Referred By Referred To  
  
 [id number] Randi LOCKETT, DO   
   [address] [place] [address], [facility] Phone: [phone] Fax: [phone] Visits Status Start Date End Date 1 New Request 5/30/17 5/30/18 If your referral has a status of pending review or denied, additional information will be sent to support the outcome of this decision. Introducing [facility] & HEALTH SERVICES! Dear Minerva Marks: Thank you for requesting a [employer] account. Our records indicate that you have previously registered for a [employer] account but its currently inactive. Please call our [employer] support line at [phone]. Additional Information If you have questions, please visit the Frequently Asked Questions section of the aWhere website at [email or web address]. [email or web address]. [email or web address] Remember, aWhere is NOT to be used for urgent needs. For medical emergencies, dial 911. Now available from your iPhone and Android! Please provide this summary of care documentation to your next provider. Your primary care clinician is listed as GLORIA LOCKETT. If you have any questions after today's visit, please call [phone].

## 2018-04-23 ENCOUNTER — OFFICE VISIT (OUTPATIENT)
Dept: FAMILY MEDICINE CLINIC | Age: 53
End: 2018-04-23

## 2018-04-23 VITALS
HEIGHT: 71 IN | WEIGHT: 271 LBS | RESPIRATION RATE: 20 BRPM | BODY MASS INDEX: 37.94 KG/M2 | TEMPERATURE: 98.6 F | SYSTOLIC BLOOD PRESSURE: 165 MMHG | DIASTOLIC BLOOD PRESSURE: 90 MMHG | HEART RATE: 64 BPM | OXYGEN SATURATION: 98 %

## 2018-04-23 DIAGNOSIS — J40 BRONCHITIS: Primary | ICD-10-CM

## 2018-04-23 DIAGNOSIS — E88.09 POEMS SYNDROME: Chronic | ICD-10-CM

## 2018-04-23 RX ORDER — CEFDINIR 300 MG/1
300 CAPSULE ORAL 2 TIMES DAILY
Qty: 20 CAP | Refills: 0 | Status: SHIPPED | OUTPATIENT
Start: 2018-04-23 | End: 2018-05-03

## 2018-04-23 RX ORDER — ALBUTEROL SULFATE 0.83 MG/ML
5 SOLUTION RESPIRATORY (INHALATION) ONCE
Qty: 1 EACH | Refills: 0
Start: 2018-04-23 | End: 2018-04-23

## 2018-04-23 RX ORDER — ALBUTEROL SULFATE 90 UG/1
2 AEROSOL, METERED RESPIRATORY (INHALATION)
Qty: 1 INHALER | Refills: 5 | Status: SHIPPED | OUTPATIENT
Start: 2018-04-23 | End: 2019-04-18

## 2018-04-23 RX ORDER — PREDNISONE 10 MG/1
TABLET ORAL
Qty: 1 PACKAGE | Refills: 0 | Status: SHIPPED | OUTPATIENT
Start: 2018-04-23 | End: 2019-05-01 | Stop reason: ALTCHOICE

## 2018-04-23 NOTE — MR AVS SNAPSHOT
[address] [id number] 
[phone] Patient: Berenice Thomas MRN: [id number] :1965 Visit Information Date & Time Provider Department Dept. Phone Encounter #  
 2018  2:10 PM Unruly Rocha MD [address] [facility] [phone] [id number] Follow-up Instructions Return if symptoms worsen or fail to improve. Upcoming Health Maintenance Date Due Pneumococcal 19-64 Highest Risk (1 of 3 - PCV13) 3/4/1985 DTaP/Tdap/Td series (1 - Tdap) 3/4/1987 FOBT Q 1 YEAR AGE 50-75 3/4/2016 Influenza Age 5 to Adult 2017 Allergies as of 2018  Review Complete On: 2018 By: Unruly Rocha MD  
  
 Severity Noted Reaction Type Reactions Heparin (Bovine)  2011    Other (comments) Questionable per patient ,back lesions. Current Immunizations  Reviewed on 2015 Name Date Influenza Vaccine 10/21/2013 Not reviewed this visit You Were Diagnosed With   
  
 Codes Comments Bronchitis    -  Primary ICD-10-CM: F73 ICD-9-CM: 337 POEMS syndrome     ICD-10-CM: E88.09 
ICD-9-CM: 273.9 Vitals BP Pulse Temp Resp Height(growth percentile) Weight(growth percentile) 165/90 64 98.6 °F (37 °C) 20 5' 11\" (1.803 m) 271 lb (122.9 kg) SpO2 BMI Smoking Status 98% 37.8 kg/m2 Never Smoker Vitals History BMI and BSA Data Body Mass Index Body Surface Area  
 37.8 kg/m 2 2.48 m 2 Preferred Pharmacy Pharmacy Name Phone [facility] [address] [facility], [address] [place] [address] Drive [phone] Your Updated Medication List  
  
   
This list is accurate as of 18  2:53 PM.  Always use your most recent med list.  
  
  
  
  
 * albuterol 90 mcg/actuation inhaler Commonly known as:  PROVENTIL HFA, VENTOLIN HFA, PROAIR HFA  
 Take 2 Puffs by inhalation every four (4) hours as needed for Wheezing for up to 360 days. * albuterol 2.5 mg /3 mL (0.083 %) nebulizer solution Commonly known as:  PROVENTIL VENTOLIN  
6 mL by Nebulization route once for 1 dose. carbinoxamine maleate 4 mg Tab Commonly known as:  Gamle Brennon 157 Take 4 mg by mouth two (2) times a day. cefdinir 300 mg capsule Commonly known as:  OMNICEF Take 1 Cap by mouth two (2) times a day for 10 days. clotrimazole-betamethasone topical cream  
Commonly known as:  Tanvi Guadeloupe Apply  to affected area two (2) times a day. diclofenac 1 % Gel Commonly known as:  VOLTAREN Apply  to affected area four (4) times daily. * methylphenidate HCl 40 mg ER capsule Commonly known as:  METADATE CD Take 40 mg by mouth every morning. Max Daily Amount: 40 mg  
  
 * methylphenidate HCl 40 mg ER capsule Commonly known as:  METADATE CD Take 40 mg by mouth every morning. Max Daily Amount: 40 mg  
  
 pantoprazole 40 mg tablet Commonly known as:  PROTONIX Take 1 Tab by mouth daily. predniSONE 10 mg dose pack Commonly known as:  STERAPRED DS  
12 day DS taper pack as directed  
  
 sildenafil citrate 100 mg tablet Commonly known as:  VIAGRA Take 1 Tab by mouth as needed. zolpidem 10 mg tablet Commonly known as:  AMBIEN Take 1 Tab by mouth nightly as needed for Sleep. Ok to fill 11/10/2015 * Notice: This list has 4 medication(s) that are the same as other medications prescribed for you. Read the directions carefully, and ask your doctor or other care provider to review them with you. Prescriptions Sent to Pharmacy Refills  
 cefdinir (OMNICEF) 300 mg capsule 0 Sig: Take 1 Cap by mouth two (2) times a day for 10 days. Class: Normal  
 Pharmacy: [facility] [address] [place], [place] [address]y [address] [place] [address] #: [phone]  Route: Oral  
 predniSONE (STERAPRED DS) 10 mg dose pack 0 Si day DS taper pack as directed Class: Normal  
 Pharmacy: [facility] [address] [place] [address], [address] [facility] Ph #: [phone]  
 albuterol (PROVENTIL HFA, VENTOLIN HFA, PROAIR HFA) 90 mcg/actuation inhaler 5 Sig: Take 2 Puffs by inhalation every four (4) hours as needed for Wheezing for up to 360 days. Class: Normal  
 Pharmacy: [facility] [address] [place] [address], [address] [place] [address] [address] [place] [address] E Ph #: [phone] Route: Inhalation We Performed the Following ALBUTEROL, INHAL. SOL., FDA-APPROVED FINAL, NON-COMPOUND UNIT DOSE, 1 MG [ HCPCS] CBC WITH AUTOMATED DIFF [30162 CPT(R)] INHAL RX, AIRWAY OBST/DX SPUTUM INDUCT [id number] CPT(R)] METABOLIC PANEL, COMPREHENSIVE [63162 CPT(R)] Follow-up Instructions Return if symptoms worsen or fail to improve. Introducing [facility] & HEALTH SERVICES! [employer] introduces Inte[employer]X patient portal. Now you can access parts of your medical record, email your doctor's office, and request medication refills online. 1. In your internet browser, go to [email or web address]. Symplified/[employer] 2. Click on the First Time User? Click Here link in the Sign In box. You will see the New Member Sign Up page. 3. Enter your IntegenX Access Code exactly as it appears below. You will not need to use this code after youve completed the sign-up process. If you do not sign up before the expiration date, you must request a new code. · IntegenX Access Code: [email or web address] Expires: 2018  2:53 PM 
 
4. Enter the last four digits of your Social Security Number (xxxx) and Date of Birth (mm/dd/yyyy) as indicated and click Submit. You will be taken to the next sign-up page. 5. Create a IntegenX ID. This will be your IntegenX login ID and cannot be changed, so think of one that is secure and easy to remember. 6. Create a Grabbit password. You can change your password at any time. 7. Enter your Password Reset Question and Answer. This can be used at a later time if you forget your password. 8. Enter your e-mail address. You will receive e-mail notification when new information is available in [address] 9. Click Sign Up. You can now view and download portions of your medical record. 10. Click the Download Summary menu link to download a portable copy of your medical information. If you have questions, please visit the Frequently Asked Questions section of the Grabbit website. Remember, Grabbit is NOT to be used for urgent needs. For medical emergencies, dial 911. Now available from your iPhone and Android! Please provide this summary of care documentation to your next provider. Your primary care clinician is listed as GLORIA LOCKETT. If you have any questions after today's visit, please call [phone].

## 2018-04-23 NOTE — PROGRESS NOTES
Patient here cough, headache  and shortness of breath started Sunday. Taking mucinex and allergy meds OTC. Not helping much. Denies fever. 1. Have you been to the ER, urgent care clinic since your last visit? Hospitalized since your last visit? No    2. Have you seen or consulted any other health care providers outside of the [address] [place] [address] since your last visit? Include any pap smears or colon screening. No       Chief Complaint   Patient presents with    Cough     cough, headache , congestion and shortness of breath,  since Sunday. He is a 47 y.o. male who presents for evalution. Reviewed PmHx, RxHx, FmHx, SocHx, AllgHx and updated and dated in the chart. Patient Active Problem List    Diagnosis    ADD (attention deficit disorder)    Insomnia    POEMS syndrome    Neuropathy associated with endocrine disorder ([facility] [address]       Review of Systems - negative except as listed above in the HPI    Objective:     Vitals:    04/23/18 1423   BP: 165/90   Pulse: 64   Resp: 20   Temp: 98.6 °F (37 °C)   SpO2: 98%   Weight: 271 lb (122.9 kg)   Height: 5' 11\" (1.803 m)     Physical Examination: General appearance - alert, well appearing, and in no distress  Nose - normal and patent, no erythema, discharge or polyps  Mouth - mucous membranes moist, pharynx normal without lesions  Neck - supple, no significant adenopathy  Chest - wheezing noted diffuse, rhonchi noted scatt  Heart - normal rate, regular rhythm, normal S1, S2, no murmurs, rubs, clicks or gallops    Assessment/ Plan:   Diagnoses and all orders for this visit:    1. Bronchitis  -     CBC WITH AUTOMATED DIFF  -     METABOLIC PANEL, COMPREHENSIVE  -     cefdinir (OMNICEF) 300 mg capsule; Take 1 Cap by mouth two (2) times a day for 10 days. -     predniSONE (STERAPRED DS) 10 mg dose pack; 12 day DS taper pack as directed  -     albuterol (PROVENTIL HFA, VENTOLIN HFA, PROAIR HFA) 90 mcg/actuation inhaler;  Take 2 Puffs by inhalation every four (4) hours as needed for Wheezing for up to 360 days. -     albuterol (PROVENTIL VENTOLIN) 2.5 mg /3 mL (0.083 %) nebulizer solution; 6 mL by Nebulization route once for 1 dose. -     ALBUTEROL, INHAL. [facility].()  -     INHAL RX, AIRWAY OBST/DX SPUTUM INDUCT ([id number])  -better after neb trt    2. POEMS syndrome  -     CBC WITH AUTOMATED DIFF  -     METABOLIC PANEL, COMPREHENSIVE       Follow-up Disposition:  Return if symptoms worsen or fail to improve. I have discussed the diagnosis with the patient and the intended plan as seen in the above orders. The patient understands and agrees with the plan. The patient has received an after-visit summary and questions were answered concerning future plans. Medication Side Effects and Warnings were discussed with patient  Patient Labs were reviewed and or requested:  Patient Past Records were reviewed and or requested    Fernando Mistry M.D. There are no Patient Instructions on file for this visit.

## 2018-04-23 NOTE — LETTER
NOTIFICATION RETURN TO WORK / SCHOOL 
 
4/23/2018 3:37 PM 
 
Mr. Naz Waggoner [address] [employer] To Whom It May Concern: 
 
Naz Waggoner is currently under the care of [address]. Out of work 4/24/18 through 4/29/18 He will return to work/school on: 04/30/18 If there are questions or concerns please have the patient contact our office. Sincerely, Perry Whipple MD

## 2018-04-24 LAB
ALBUMIN SERPL-MCNC: 3.9 G/DL (ref 3.5–5.5)
ALBUMIN/GLOB SERPL: 1.3 {RATIO} (ref 1.2–2.2)
ALP SERPL-CCNC: 99 IU/L (ref 39–117)
ALT SERPL-CCNC: 28 IU/L (ref 0–44)
AST SERPL-CCNC: 38 IU/L (ref 0–40)
BASOPHILS # BLD AUTO: 0 X10E3/UL (ref 0–0.2)
BASOPHILS NFR BLD AUTO: 0 %
BILIRUB SERPL-MCNC: 0.7 MG/DL (ref 0–1.2)
BUN SERPL-MCNC: 16 MG/DL (ref 6–24)
BUN/CREAT SERPL: 16 (ref 9–20)
CALCIUM SERPL-MCNC: 8.9 MG/DL (ref 8.7–10.2)
CHLORIDE SERPL-SCNC: 101 MMOL/L (ref 96–106)
CO2 SERPL-SCNC: 23 MMOL/L (ref 18–29)
CREAT SERPL-MCNC: 1 MG/DL (ref 0.76–1.27)
EOSINOPHIL # BLD AUTO: 0.9 X10E3/UL (ref 0–0.4)
EOSINOPHIL NFR BLD AUTO: 7 %
ERYTHROCYTE [DISTWIDTH] IN BLOOD BY AUTOMATED COUNT: 14.8 % (ref 12.3–15.4)
GFR SERPLBLD CREATININE-BSD FMLA CKD-EPI: 100 ML/MIN/1.73
GFR SERPLBLD CREATININE-BSD FMLA CKD-EPI: 86 ML/MIN/1.73
GLOBULIN SER CALC-MCNC: 3 G/DL (ref 1.5–4.5)
GLUCOSE SERPL-MCNC: 78 MG/DL (ref 65–99)
HCT VFR BLD AUTO: 48.3 % (ref 37.5–51)
HGB BLD-MCNC: 16 G/DL (ref 13–17.7)
IMM GRANULOCYTES # BLD: 0 X10E3/UL (ref 0–0.1)
IMM GRANULOCYTES NFR BLD: 0 %
LYMPHOCYTES # BLD AUTO: 1.3 X10E3/UL (ref 0.7–3.1)
LYMPHOCYTES NFR BLD AUTO: 10 %
MCH RBC QN AUTO: 27.4 PG (ref 26.6–33)
MCHC RBC AUTO-ENTMCNC: 33.1 G/DL (ref 31.5–35.7)
MCV RBC AUTO: 83 FL (ref 79–97)
MONOCYTES # BLD AUTO: 1 X10E3/UL (ref 0.1–0.9)
MONOCYTES NFR BLD AUTO: 8 %
NEUTROPHILS # BLD AUTO: 9.1 X10E3/UL (ref 1.4–7)
NEUTROPHILS NFR BLD AUTO: 75 %
PLATELET # BLD AUTO: 155 X10E3/UL (ref 150–379)
POTASSIUM SERPL-SCNC: 4.1 MMOL/L (ref 3.5–5.2)
PROT SERPL-MCNC: 6.9 G/DL (ref 6–8.5)
RBC # BLD AUTO: 5.83 X10E6/UL (ref 4.14–5.8)
SODIUM SERPL-SCNC: 139 MMOL/L (ref 134–144)
WBC # BLD AUTO: 12.3 X10E3/UL (ref 3.4–10.8)

## 2018-04-24 NOTE — PROGRESS NOTES
After reviewing your labs, I believe they are within normal  limits for your age and let us stay with our current plan of action. In addition, you may receive a [employer] from our office. Thank you in advance for filling this out for us, and if you cannot   give a 10 on our ratings, please reach out to us and let us know  what we can do better for you! We strive for a ten, and while we   may not be perfect 100% of the time, we always try our best for  our patients! Gladis Holt M.D.   Good Help to Those in Need

## 2019-05-01 ENCOUNTER — OFFICE VISIT (OUTPATIENT)
Dept: FAMILY MEDICINE CLINIC | Age: 54
End: 2019-05-01

## 2019-05-01 VITALS
BODY MASS INDEX: 36.82 KG/M2 | WEIGHT: 263 LBS | HEART RATE: 72 BPM | DIASTOLIC BLOOD PRESSURE: 80 MMHG | HEIGHT: 71 IN | TEMPERATURE: 97.9 F | RESPIRATION RATE: 20 BRPM | SYSTOLIC BLOOD PRESSURE: 130 MMHG | OXYGEN SATURATION: 98 %

## 2019-05-01 DIAGNOSIS — M25.511 ACUTE PAIN OF RIGHT SHOULDER: ICD-10-CM

## 2019-05-01 DIAGNOSIS — F98.8 ATTENTION DEFICIT DISORDER, UNSPECIFIED HYPERACTIVITY PRESENCE: ICD-10-CM

## 2019-05-01 DIAGNOSIS — E66.01 SEVERE OBESITY (HCC): ICD-10-CM

## 2019-05-01 DIAGNOSIS — Z88.9 MULTIPLE ALLERGIES: Primary | ICD-10-CM

## 2019-05-01 RX ORDER — LEVOCETIRIZINE DIHYDROCHLORIDE 5 MG/1
5 TABLET, FILM COATED ORAL DAILY
Qty: 90 TAB | Refills: 3 | Status: SHIPPED | OUTPATIENT
Start: 2019-05-01 | End: 2019-05-01 | Stop reason: SDUPTHER

## 2019-05-01 RX ORDER — LEVOCETIRIZINE DIHYDROCHLORIDE 5 MG/1
5 TABLET, FILM COATED ORAL DAILY
Qty: 90 TAB | Refills: 3 | Status: SHIPPED | OUTPATIENT
Start: 2019-05-01 | End: 2020-04-01

## 2019-05-01 RX ORDER — METHYLPHENIDATE HYDROCHLORIDE 40 MG/1
40 CAPSULE, EXTENDED RELEASE ORAL
Qty: 90 CAP | Refills: 0 | Status: SHIPPED | OUTPATIENT
Start: 2019-05-01 | End: 2020-04-02 | Stop reason: SDUPTHER

## 2019-05-01 RX ORDER — DICLOFENAC SODIUM 75 MG/1
75 TABLET, DELAYED RELEASE ORAL 2 TIMES DAILY
Qty: 60 TAB | Refills: 2 | Status: SHIPPED | OUTPATIENT
Start: 2019-05-01 | End: 2019-05-01 | Stop reason: SDUPTHER

## 2019-05-01 RX ORDER — DICLOFENAC SODIUM 75 MG/1
75 TABLET, DELAYED RELEASE ORAL 2 TIMES DAILY
Qty: 60 TAB | Refills: 2 | Status: SHIPPED | OUTPATIENT
Start: 2019-05-01 | End: 2019-05-15

## 2019-05-01 NOTE — PROGRESS NOTES
Patient here for med refill. Right upper arm pain x 3 months. 5/10  1. Have you been to the ER, urgent care clinic since your last visit? Hospitalized since your last visit? No    2. Have you seen or consulted any other health care providers outside of the [address] [place] [address] since your last visit? Include any pap smears or colon screening. No       Chief Complaint   Patient presents with    Medication Refill    Arm Pain     right upper arm pain x 3 months    Allergies     carbooximine maliate not working anymore. He is a 49 y.o. male who presents for evalution. Reviewed PmHx, RxHx, FmHx, SocHx, AllgHx and updated and dated in the chart. Patient Active Problem List    Diagnosis    [facility]    ADD (attention deficit disorder)    Insomnia    POEMS syndrome    Neuropathy associated with endocrine disorder ([facility]       Review of Systems - negative except as listed above in the HPI    Objective:     Vitals:    05/01/19 1546   BP: 130/80   Pulse: 72   Resp: 20   Temp: 97.9 °F (36.6 °C)   SpO2: 98%   Weight: 263 lb (119.3 kg)   Height: 5' 11\" (1.803 m)     Physical Examination: General appearance - alert, well appearing, and in no distress  Chest - clear to auscultation, no wheezes, rales or rhonchi, symmetric air entry  Heart - normal rate, regular rhythm, normal S1, S2, no murmurs, rubs, clicks or gallops      Assessment/ Plan:   Diagnoses and all orders for this visit:    1. Multiple allergies  -     levocetirizine (XYZAL) 5 mg tablet; Take 1 Tab by mouth daily. 2. Severe obesity ([facility]    3. Attention deficit disorder, unspecified hyperactivity presence  -     methylphenidate HCl (METADATE CD) 40 mg BP30; Take 40 mg by mouth every morning. Max Daily Amount: 40 mg.  -stable    4. Acute pain of right shoulder  -     diclofenac EC (VOLTAREN) 75 mg EC tablet;  Take 1 Tab by mouth two (2) times a day for 14 days.  -add rx       Follow-up and Dispositions    · Return if symptoms worsen or fail to improve. I have discussed the diagnosis with the patient and the intended plan as seen in the above orders. The patient understands and agrees with the plan. The patient has received an after-visit summary and questions were answered concerning future plans. Medication Side Effects and Warnings were discussed with patient  Patient Labs were reviewed and or requested:  Patient Past Records were reviewed and or requested    Delfino Grande M.D. There are no Patient Instructions on file for this visit.

## 2019-10-09 ENCOUNTER — DOCUMENTATION ONLY (OUTPATIENT)
Dept: FAMILY MEDICINE CLINIC | Age: 54
End: 2019-10-09

## 2019-10-09 NOTE — PROGRESS NOTES
DOT form regarding medical clearance for psychological disorder medication was placed on Marylee's desk to process.

## 2019-11-27 ENCOUNTER — OFFICE VISIT (OUTPATIENT)
Dept: FAMILY MEDICINE CLINIC | Age: 54
End: 2019-11-27

## 2019-11-27 VITALS
WEIGHT: 250 LBS | DIASTOLIC BLOOD PRESSURE: 75 MMHG | BODY MASS INDEX: 35 KG/M2 | HEIGHT: 71 IN | TEMPERATURE: 97.9 F | HEART RATE: 66 BPM | RESPIRATION RATE: 16 BRPM | OXYGEN SATURATION: 97 % | SYSTOLIC BLOOD PRESSURE: 117 MMHG

## 2019-11-27 DIAGNOSIS — F98.8 ATTENTION DEFICIT DISORDER, UNSPECIFIED HYPERACTIVITY PRESENCE: ICD-10-CM

## 2019-11-27 DIAGNOSIS — K21.9 GASTROESOPHAGEAL REFLUX DISEASE WITHOUT ESOPHAGITIS: Primary | ICD-10-CM

## 2019-11-27 RX ORDER — CARBINOXAMINE MALEATE 4 MG/1
4 TABLET ORAL 2 TIMES DAILY
Qty: 180 TAB | Refills: 3 | Status: SHIPPED | OUTPATIENT
Start: 2019-11-27 | End: 2020-04-01 | Stop reason: SDUPTHER

## 2019-11-27 RX ORDER — METHYLPHENIDATE HYDROCHLORIDE 40 MG/1
40 CAPSULE, EXTENDED RELEASE ORAL
Qty: 90 CAP | Refills: 0 | Status: SHIPPED | OUTPATIENT
Start: 2019-11-27 | End: 2020-04-02 | Stop reason: SDUPTHER

## 2019-11-27 RX ORDER — PANTOPRAZOLE SODIUM 40 MG/1
40 TABLET, DELAYED RELEASE ORAL DAILY
Qty: 90 TAB | Refills: 3 | Status: ON HOLD | OUTPATIENT
Start: 2019-11-27 | End: 2021-08-05

## 2019-11-27 NOTE — PROGRESS NOTES
Chief Complaint   Patient presents with    Medication Refill     Patient presents in office today for med refill of methylphenidate. Would like to discuss switching the pantoprazole to omeprazole. No concerns. 1. Have you been to the ER, urgent care clinic since your last visit? Hospitalized since your last visit? No    2. Have you seen or consulted any other health care providers outside of the [address] [place] [address] since your last visit? Include any pap smears or colon screening. No    Learning Assessment 10/16/2017   PRIMARY LEARNER Patient   HIGHEST LEVEL OF EDUCATION - PRIMARY LEARNER  GRADUATED HIGH SCHOOL OR GED   BARRIERS PRIMARY LEARNER NONE   CO-LEARNER CAREGIVER No   PRIMARY LANGUAGE ENGLISH   LEARNER PREFERENCE PRIMARY DEMONSTRATION   ANSWERED BY pat   RELATIONSHIP SELF       Chief Complaint   Patient presents with    Medication Refill     He is a 49 y.o. male who presents for evalution. Reviewed PmHx, RxHx, FmHx, SocHx, AllgHx and updated and dated in the chart. Patient Active Problem List    Diagnosis    Severe obesity ([facility]    ADD (attention deficit disorder)    Insomnia    POEMS syndrome    Neuropathy associated with endocrine disorder ([facility]       Review of Systems - negative except as listed above in the HPI    Objective:     Vitals:    11/27/19 0752   BP: 117/75   Pulse: 66   Resp: 16   Temp: 97.9 °F (36.6 °C)   TempSrc: Oral   SpO2: 97%   Weight: 250 lb (113.4 kg)   Height: 5' 11\" (1.803 m)     Physical Examination: General appearance - alert, well appearing, and in no distress  Neck - supple, no significant adenopathy  Chest - clear to auscultation, no wheezes, rales or rhonchi, symmetric air entry  Heart - normal rate, regular rhythm, normal S1, S2, no murmurs, rubs, clicks or gallops  Extremities - peripheral pulses normal, no pedal edema, no clubbing or cyanosis        Assessment/ Plan:   Diagnoses and all orders for this visit:    1.  Attention deficit disorder, unspecified hyperactivity presence  -     methylphenidate HCl (METADATE CD) 40 mg BP30; Take 40 mg by mouth every morning. Max Daily Amount: 40 mg.  -3 fills  -stable  -cont gerd rx     Follow-up and Dispositions    · Return in about 3 months (around 2/27/2020) for add. I have discussed the diagnosis with the patient and the intended plan as seen in the above orders. The patient understands and agrees with the plan. The patient has received an after-visit summary and questions were answered concerning future plans. Medication Side Effects and Warnings were discussed with patient  Patient Labs were reviewed and or requested:  Patient Past Records were reviewed and or requested    Zohaib Jimenes M.D. There are no Patient Instructions on file for this visit.

## 2020-04-01 RX ORDER — CARBINOXAMINE MALEATE 4 MG/1
4 TABLET ORAL 2 TIMES DAILY
Qty: 180 TAB | Refills: 3 | Status: ON HOLD | OUTPATIENT
Start: 2020-04-01 | End: 2021-08-05

## 2020-04-02 ENCOUNTER — VIRTUAL VISIT (OUTPATIENT)
Dept: FAMILY MEDICINE CLINIC | Age: 55
End: 2020-04-02

## 2020-04-02 ENCOUNTER — DOCUMENTATION ONLY (OUTPATIENT)
Dept: FAMILY MEDICINE CLINIC | Age: 55
End: 2020-04-02

## 2020-04-02 DIAGNOSIS — F98.8 ATTENTION DEFICIT DISORDER, UNSPECIFIED HYPERACTIVITY PRESENCE: Primary | ICD-10-CM

## 2020-04-02 DIAGNOSIS — E88.09 POEMS SYNDROME: Chronic | ICD-10-CM

## 2020-04-02 RX ORDER — METHYLPHENIDATE HYDROCHLORIDE 40 MG/1
40 CAPSULE, EXTENDED RELEASE ORAL
Qty: 90 CAP | Refills: 0 | Status: SHIPPED | OUTPATIENT
Start: 2020-04-02 | End: 2021-09-20 | Stop reason: SDUPTHER

## 2020-04-02 NOTE — PROGRESS NOTES
Consent: Ta Knapp, who was seen by synchronous (real-time) audio-video technology, and/or his healthcare decision maker, is aware that this patient-initiated, Telehealth encounter on 4/2/2020 is a billable service, with coverage as determined by his insurance carrier. He is aware that he may receive a bill and has provided verbal consent to proceed: Yes. Pt has a history of POEMS disease. Diagnosed in 2001. Pt has concerns about increased susceptibility to covid 19 due to his history of POEMS. Pt is still working. Pt has to go to job sites where people  are working. Trying to practice social distancing. Denies being on any medications for this. Was able to wean himself off of everything. Used to see oncology Dr. Torsten [facility]. Had serial labs which were all normal. Has a stent that runs from the liver to his kidneys. Unsure when he last saw specialist for any of this. Fax number for letter is [phone]. Attention: Luís Longoria. Pt reports good concentration and focus. Denies any insomnia. Denies poor appetite. Denies any other associated SEs of medication. Assessment & Plan:   Diagnoses and all orders for this visit:    1. Attention deficit disorder, unspecified hyperactivity presence  -     methylphenidate HCl (Metadate CD) 40 mg BP30; Take 40 mg by mouth every morning. Max Daily Amount: 40 mg. Refilled rx. Stable on med regimen. Follow up in 3 months for next med refill. 2. POEMS syndrome  Recommended pt limit any contact with the public during covid 19 pandemic due to his compromised immune system. Will complete and fax letter to employer with request. Enc social distancing and to monitor for any s/sx of virus. I spent at least 15 minutes with this established patient, and >50% of the time was spent counseling and/or coordinating care regarding medication refill for methylphenidate and concerns regarding Covid 19 pandemic.   712  Subjective:   Ta Knapp is a 48 y.o. male who was seen for Medication Refill      Prior to Admission medications    Medication Sig Start Date End Date Taking? Authorizing Provider   carbinoxamine maleate (PALGIC) 4 mg tab Take 4 mg by mouth two (2) times a day. 4/1/20   Delores Wright MD   zolpidem (AMBIEN) 10 mg tablet Take 1 Tab by mouth nightly as needed for Sleep. Ok to fill 11/10/2015 3/24/20   Delores Wright MD   methylphenidate HCl (METADATE CD) 40 mg BP30 Take 40 mg by mouth every morning. Max Daily Amount: 40 mg. 11/27/19   Delores Wright MD   pantoprazole (PROTONIX) 40 mg tablet Take 1 Tab by mouth daily. 11/27/19   Delores Wright MD   methylphenidate HCl (METADATE CD) 40 mg BP30 Take 40 mg by mouth every morning. Max Daily Amount: 40 mg. 5/1/19   Delores Wright MD   diclofenac (VOLTAREN) 1 % gel Apply  to affected area four (4) times daily. 2/23/17   Delores Wright MD   sildenafil citrate (VIAGRA) 100 mg tablet Take 1 Tab by mouth as needed. 10/9/15   Mare Prather NP   clotrimazole-betamethasone (LOTRISONE) topical cream Apply  to affected area two (2) times a day. 7/1/15   Delores Wright MD     Allergies   Allergen Reactions    Heparin (Bovine) Other (comments)     Questionable per patient ,back lesions. Patient Active Problem List   Diagnosis Code    POEMS syndrome E88.09    Neuropathy associated with endocrine disorder ([facility] E34.9, G63    Insomnia G47.00    ADD (attention deficit disorder) F98.8    Severe obesity ([address] E66.01     Patient Active Problem List    Diagnosis Date Noted    Severe obesity ([facility] 05/01/2019    ADD (attention deficit disorder) 07/05/2017    Insomnia 03/22/2012    POEMS syndrome 03/11/2010    Neuropathy associated with endocrine disorder ([facility] 03/11/2010     Current Outpatient Medications   Medication Sig Dispense Refill    carbinoxamine maleate (PALGIC) 4 mg tab Take 4 mg by mouth two (2) times a day.  180 Tab 3    zolpidem (AMBIEN) 10 mg tablet Take 1 Tab by mouth nightly as needed for Sleep. Ok to fill 11/10/2015 90 Tab 3    methylphenidate HCl (METADATE CD) 40 mg BP30 Take 40 mg by mouth every morning. Max Daily Amount: 40 mg. 90 Cap 0    pantoprazole (PROTONIX) 40 mg tablet Take 1 Tab by mouth daily. 90 Tab 3    methylphenidate HCl (METADATE CD) 40 mg BP30 Take 40 mg by mouth every morning. Max Daily Amount: 40 mg. 90 Cap 0    diclofenac (VOLTAREN) 1 % gel Apply  to affected area four (4) times daily. 100 g 1    sildenafil citrate (VIAGRA) 100 mg tablet Take 1 Tab by mouth as needed. 12 Tab 3    clotrimazole-betamethasone (LOTRISONE) topical cream Apply  to affected area two (2) times a day. 60 g 1     Allergies   Allergen Reactions    Heparin (Bovine) Other (comments)     Questionable per patient ,back lesions. Past Medical History:   Diagnosis Date    Chronic pain     bilateral feet,neuropathy    Neuropathy associated with endocrine disorder ([facility] 3/11/2010    Other ill-defined conditions(799.89) 2001    POEMS Syndrome, seen at [place] [id number] syndrome 3/11/2010     Past Surgical History:   Procedure Laterality Date    BIOPSY LIVER      CELL COUNT, CSF      x 2    COLONOSCOPY      Upper and Lower     FOOT/TOES SURGERY PROC UNLISTED      Remove Nerve from Left foot     HX GI      liver stent    HX ORTHOPAEDIC      MRI BRAIN W WO CONT      STENT INSERTION      Liver to Kidneys      No family history on file. Social History     Tobacco Use    Smoking status: Never Smoker    Smokeless tobacco: Never Used   Substance Use Topics    Alcohol use: Yes       ROS        Objective:   Vital Signs: (As obtained by patient/caregiver at home)  There were no vitals taken for this visit.      [INSTRUCTIONS:  \"[x]\" Indicates a positive item  \"[]\" Indicates a negative item  -- DELETE ALL ITEMS NOT EXAMINED]    Constitutional: [x] Appears well-developed and well-nourished [x] No apparent distress      [] Abnormal -     Mental status: [x] Alert and awake  [x] Oriented to person/place/time [x] Able to follow commands    [] Abnormal -     Eyes:   EOM    [x]  Normal    [] Abnormal -   Sclera  [x]  Normal    [] Abnormal -          Discharge [x]  None visible   [] Abnormal -     HENT: [x] Normocephalic, atraumatic  [] Abnormal -   [x] Mouth/Throat: Mucous membranes are moist    External Ears [x] Normal  [] Abnormal -    Neck: [x] No visualized mass [] Abnormal -     Pulmonary/Chest: [x] Respiratory effort normal   [x] No visualized signs of difficulty breathing or respiratory distress        [] Abnormal -     Neurological:        [x] No Facial Asymmetry (Cranial nerve 7 motor function) (limited exam due to video visit)          [x] No gaze palsy        [] Abnormal -          Skin:        [x] No significant exanthematous lesions or discoloration noted on facial skin         [] Abnormal -            Psychiatric:       [x] Normal Affect [] Abnormal -        [x] No Hallucinations    Other pertinent observable physical exam findings:-        We discussed the expected course, resolution and complications of the diagnosis(es) in detail. Medication risks, benefits, costs, interactions, and alternatives were discussed as indicated. I advised him to contact the office if his condition worsens, changes or fails to improve as anticipated. He expressed understanding with the diagnosis(es) and plan. Vignesh Waterman is a 48 y.o. male being evaluated by a video visit encounter for concerns as above. A caregiver was present when appropriate. Due to this being a TeleHealth encounter (During [employer]GY[employer]-18 public health emergency), evaluation of the following organ systems was limited: Vitals/Constitutional/EENT/Resp/CV/GI//MS/Neuro/Skin/Heme-Lymph-Imm.   Pursuant to the emergency declaration under the [facility] waiver authority and the [employer] Act, this Virtual  Visit was conducted, with patient's (and/or legal guardian's) consent, to reduce the patient's risk of exposure to COVID-19 and provide necessary medical care. Services were provided through a video synchronous discussion virtually to substitute for in-person clinic visit. Patient and provider were located at their individual homes.         Sarita Cordero NP

## 2020-04-27 ENCOUNTER — TELEPHONE (OUTPATIENT)
Dept: FAMILY MEDICINE CLINIC | Age: 55
End: 2020-04-27

## 2020-04-27 NOTE — TELEPHONE ENCOUNTER
Have spoken with pharmacy,stated rx was in system and placed on hold. Would like for pt to call pharmacy so they can make sure pt would like to fill now,pt has been notified.

## 2020-04-27 NOTE — TELEPHONE ENCOUNTER
----- Message from Marie Mi sent at 2020  1:13 PM EDT -----  Regarding: BEV Persaud/myron  General Message/Vendor Calls  To: [facility]  Subject: BEV Persaud/telephone  Patient's first and last name: Livan Douglas  : 1965  ID numbers:#[id number]    Caller's first and last name: n/a  Reason for call: Patient stated that his prescription for \"Methylphenidate\" 40mg 90 day supply has not been received by his pharmacy. Patient stated that the prescription had been called in  after his virtual visit.   Callback required yes/no and why: yes  Best contact number(s):[phone]  Details to clarify the request: n/a    Gianna Kent

## 2021-08-04 ENCOUNTER — TELEPHONE (OUTPATIENT)
Dept: FAMILY MEDICINE CLINIC | Age: 56
End: 2021-08-04

## 2021-08-04 NOTE — TELEPHONE ENCOUNTER
----- Message from [facility] sent at 8/4/2021 12:07 PM EDT -----  Regarding: Dr. Anastasiya Rosales  General Message/Vendor Calls    Caller's first and last name: Pt      Reason for call: Pt needs to be squeezed in to be seen. Pt states he is having blood in his stool and feels like he has a fever, and occassional dizziness. Insists that he would not make an acute visit if he was not very sick.  Wondering if it is his kidneys      Callback required yes/no and why: Yes      Best contact number(s): [phone]      Details to clarify the request: n/A      [facility]

## 2021-08-05 ENCOUNTER — ANESTHESIA EVENT (OUTPATIENT)
Dept: ENDOSCOPY | Age: 56
DRG: 381 | End: 2021-08-05
Payer: COMMERCIAL

## 2021-08-05 ENCOUNTER — APPOINTMENT (OUTPATIENT)
Dept: CT IMAGING | Age: 56
DRG: 381 | End: 2021-08-05
Attending: STUDENT IN AN ORGANIZED HEALTH CARE EDUCATION/TRAINING PROGRAM
Payer: COMMERCIAL

## 2021-08-05 ENCOUNTER — ANESTHESIA (OUTPATIENT)
Dept: ENDOSCOPY | Age: 56
DRG: 381 | End: 2021-08-05
Payer: COMMERCIAL

## 2021-08-05 ENCOUNTER — HOSPITAL ENCOUNTER (INPATIENT)
Age: 56
LOS: 2 days | Discharge: HOME | DRG: 381 | End: 2021-08-07
Attending: STUDENT IN AN ORGANIZED HEALTH CARE EDUCATION/TRAINING PROGRAM | Admitting: HOSPITALIST
Payer: COMMERCIAL

## 2021-08-05 ENCOUNTER — APPOINTMENT (OUTPATIENT)
Dept: ENDOSCOPY | Age: 56
DRG: 381 | End: 2021-08-05
Attending: INTERNAL MEDICINE
Payer: COMMERCIAL

## 2021-08-05 DIAGNOSIS — K92.2 GASTROINTESTINAL HEMORRHAGE, UNSPECIFIED GASTROINTESTINAL HEMORRHAGE TYPE: ICD-10-CM

## 2021-08-05 DIAGNOSIS — R55 SYNCOPE AND COLLAPSE: ICD-10-CM

## 2021-08-05 DIAGNOSIS — E16.2 HYPOGLYCEMIA: ICD-10-CM

## 2021-08-05 DIAGNOSIS — D64.9 ANEMIA, UNSPECIFIED TYPE: Primary | ICD-10-CM

## 2021-08-05 LAB
ALBUMIN SERPL-MCNC: 2.1 G/DL (ref 3.5–5)
ALBUMIN/GLOB SERPL: 0.8 {RATIO} (ref 1.1–2.2)
ALP SERPL-CCNC: 66 U/L (ref 45–117)
ALT SERPL-CCNC: 29 U/L (ref 12–78)
ANION GAP SERPL CALC-SCNC: 7 MMOL/L (ref 5–15)
AST SERPL W P-5'-P-CCNC: 18 U/L (ref 15–37)
ATRIAL RATE: 68 BPM
BASOPHILS # BLD: 0.1 K/UL (ref 0–0.1)
BASOPHILS NFR BLD: 0 % (ref 0–1)
BILIRUB SERPL-MCNC: 0.3 MG/DL (ref 0.2–1)
BUN SERPL-MCNC: 56 MG/DL (ref 6–20)
BUN/CREAT SERPL: 52 (ref 12–20)
CA-I BLD-MCNC: 7.6 MG/DL (ref 8.5–10.1)
CALCULATED P AXIS, ECG09: 28 DEGREES
CALCULATED R AXIS, ECG10: 56 DEGREES
CALCULATED T AXIS, ECG11: 105 DEGREES
CHLORIDE SERPL-SCNC: 106 MMOL/L (ref 97–108)
CO2 SERPL-SCNC: 27 MMOL/L (ref 21–32)
COLLECT DATE STL: ABNORMAL
COVID-19 RAPID TEST, COVR: NOT DETECTED
CREAT SERPL-MCNC: 1.07 MG/DL (ref 0.7–1.3)
DIAGNOSIS, 93000: NORMAL
DIFFERENTIAL METHOD BLD: ABNORMAL
EOSINOPHIL # BLD: 0 K/UL (ref 0–0.4)
EOSINOPHIL NFR BLD: 0 % (ref 0–7)
ERYTHROCYTE [DISTWIDTH] IN BLOOD BY AUTOMATED COUNT: 15.4 % (ref 11.5–14.5)
GLOBULIN SER CALC-MCNC: 2.6 G/DL (ref 2–4)
GLUCOSE BLD STRIP.AUTO-MCNC: 120 MG/DL (ref 65–117)
GLUCOSE BLD STRIP.AUTO-MCNC: 127 MG/DL (ref 65–117)
GLUCOSE BLD STRIP.AUTO-MCNC: 129 MG/DL (ref 65–117)
GLUCOSE BLD STRIP.AUTO-MCNC: 135 MG/DL (ref 65–117)
GLUCOSE SERPL-MCNC: 235 MG/DL (ref 65–100)
HCT VFR BLD AUTO: 23.6 % (ref 36.6–50.3)
HEMOCCULT SP1 STL QL: POSITIVE
HGB BLD-MCNC: 7.4 G/DL (ref 12.1–17)
HGB BLD-MCNC: 7.9 G/DL (ref 12.1–17)
IMM GRANULOCYTES # BLD AUTO: 0.1 K/UL (ref 0–0.04)
IMM GRANULOCYTES NFR BLD AUTO: 1 % (ref 0–0.5)
INR PPP: 1.3 (ref 0.9–1.1)
LYMPHOCYTES # BLD: 1 K/UL (ref 0.8–3.5)
LYMPHOCYTES NFR BLD: 8 % (ref 12–49)
MCH RBC QN AUTO: 27.1 PG (ref 26–34)
MCHC RBC AUTO-ENTMCNC: 31.4 G/DL (ref 30–36.5)
MCV RBC AUTO: 86.4 FL (ref 80–99)
MONOCYTES # BLD: 0.5 K/UL (ref 0–1)
MONOCYTES NFR BLD: 4 % (ref 5–13)
MRSA DNA SPEC QL NAA+PROBE: DETECTED
NEUTS SEG # BLD: 10.5 K/UL (ref 1.8–8)
NEUTS SEG NFR BLD: 87 % (ref 32–75)
NRBC # BLD: 0 K/UL (ref 0–0.01)
NRBC BLD-RTO: 0 PER 100 WBC
P-R INTERVAL, ECG05: 148 MS
PERFORMED BY, TECHID: ABNORMAL
PLATELET # BLD AUTO: 203 K/UL (ref 150–400)
PMV BLD AUTO: 11.7 FL (ref 8.9–12.9)
POTASSIUM SERPL-SCNC: 3.7 MMOL/L (ref 3.5–5.1)
PROT SERPL-MCNC: 4.7 G/DL (ref 6.4–8.2)
PROTHROMBIN TIME: 16.1 SEC (ref 11.9–14.7)
Q-T INTERVAL, ECG07: 462 MS
QRS DURATION, ECG06: 114 MS
QTC CALCULATION (BEZET), ECG08: 491 MS
RBC # BLD AUTO: 2.73 M/UL (ref 4.1–5.7)
SODIUM SERPL-SCNC: 140 MMOL/L (ref 136–145)
SPECIMEN SOURCE: NORMAL
VENTRICULAR RATE, ECG03: 68 BPM
WBC # BLD AUTO: 12.1 K/UL (ref 4.1–11.1)

## 2021-08-05 PROCEDURE — 88312 SPECIAL STAINS GROUP 1: CPT

## 2021-08-05 PROCEDURE — 88341 IMHCHEM/IMCYTCHM EA ADD ANTB: CPT

## 2021-08-05 PROCEDURE — 36430 TRANSFUSION BLD/BLD COMPNT: CPT

## 2021-08-05 PROCEDURE — 76060000032 HC ANESTHESIA 0.5 TO 1 HR: Performed by: INTERNAL MEDICINE

## 2021-08-05 PROCEDURE — 0DB58ZX EXCISION OF ESOPHAGUS, VIA NATURAL OR ARTIFICIAL OPENING ENDOSCOPIC, DIAGNOSTIC: ICD-10-PCS | Performed by: INTERNAL MEDICINE

## 2021-08-05 PROCEDURE — 74011250636 HC RX REV CODE- 250/636: Performed by: NURSE ANESTHETIST, CERTIFIED REGISTERED

## 2021-08-05 PROCEDURE — 87641 MR-STAPH DNA AMP PROBE: CPT

## 2021-08-05 PROCEDURE — 74011000250 HC RX REV CODE- 250: Performed by: STUDENT IN AN ORGANIZED HEALTH CARE EDUCATION/TRAINING PROGRAM

## 2021-08-05 PROCEDURE — 85025 COMPLETE CBC W/AUTO DIFF WBC: CPT

## 2021-08-05 PROCEDURE — 74011000250 HC RX REV CODE- 250: Performed by: NURSE ANESTHETIST, CERTIFIED REGISTERED

## 2021-08-05 PROCEDURE — 86901 BLOOD TYPING SEROLOGIC RH(D): CPT

## 2021-08-05 PROCEDURE — 86923 COMPATIBILITY TEST ELECTRIC: CPT

## 2021-08-05 PROCEDURE — 74011000636 HC RX REV CODE- 636: Performed by: STUDENT IN AN ORGANIZED HEALTH CARE EDUCATION/TRAINING PROGRAM

## 2021-08-05 PROCEDURE — 94760 N-INVAS EAR/PLS OXIMETRY 1: CPT

## 2021-08-05 PROCEDURE — 80053 COMPREHEN METABOLIC PANEL: CPT

## 2021-08-05 PROCEDURE — 85018 HEMOGLOBIN: CPT

## 2021-08-05 PROCEDURE — 74011250636 HC RX REV CODE- 250/636: Performed by: HOSPITALIST

## 2021-08-05 PROCEDURE — C9113 INJ PANTOPRAZOLE SODIUM, VIA: HCPCS | Performed by: STUDENT IN AN ORGANIZED HEALTH CARE EDUCATION/TRAINING PROGRAM

## 2021-08-05 PROCEDURE — 74011000250 HC RX REV CODE- 250: Performed by: HOSPITALIST

## 2021-08-05 PROCEDURE — 85610 PROTHROMBIN TIME: CPT

## 2021-08-05 PROCEDURE — 87635 SARS-COV-2 COVID-19 AMP PRB: CPT

## 2021-08-05 PROCEDURE — 74011250636 HC RX REV CODE- 250/636: Performed by: STUDENT IN AN ORGANIZED HEALTH CARE EDUCATION/TRAINING PROGRAM

## 2021-08-05 PROCEDURE — 88342 IMHCHEM/IMCYTCHM 1ST ANTB: CPT

## 2021-08-05 PROCEDURE — 74174 CTA ABD&PLVS W/CONTRAST: CPT

## 2021-08-05 PROCEDURE — 96374 THER/PROPH/DIAG INJ IV PUSH: CPT

## 2021-08-05 PROCEDURE — 82272 OCCULT BLD FECES 1-3 TESTS: CPT

## 2021-08-05 PROCEDURE — C9113 INJ PANTOPRAZOLE SODIUM, VIA: HCPCS | Performed by: HOSPITALIST

## 2021-08-05 PROCEDURE — 93005 ELECTROCARDIOGRAM TRACING: CPT

## 2021-08-05 PROCEDURE — 82962 GLUCOSE BLOOD TEST: CPT

## 2021-08-05 PROCEDURE — 2709999900 HC NON-CHARGEABLE SUPPLY: Performed by: INTERNAL MEDICINE

## 2021-08-05 PROCEDURE — 30233N1 TRANSFUSION OF NONAUTOLOGOUS RED BLOOD CELLS INTO PERIPHERAL VEIN, PERCUTANEOUS APPROACH: ICD-10-PCS | Performed by: FAMILY MEDICINE

## 2021-08-05 PROCEDURE — 77030021593 HC FCPS BIOP ENDOSC BSC -A: Performed by: INTERNAL MEDICINE

## 2021-08-05 PROCEDURE — 88305 TISSUE EXAM BY PATHOLOGIST: CPT

## 2021-08-05 PROCEDURE — 76040000007: Performed by: INTERNAL MEDICINE

## 2021-08-05 PROCEDURE — 65270000029 HC RM PRIVATE

## 2021-08-05 PROCEDURE — 99285 EMERGENCY DEPT VISIT HI MDM: CPT

## 2021-08-05 PROCEDURE — P9016 RBC LEUKOCYTES REDUCED: HCPCS

## 2021-08-05 RX ORDER — SODIUM CHLORIDE, SODIUM LACTATE, POTASSIUM CHLORIDE, CALCIUM CHLORIDE 600; 310; 30; 20 MG/100ML; MG/100ML; MG/100ML; MG/100ML
100 INJECTION, SOLUTION INTRAVENOUS CONTINUOUS
Status: DISCONTINUED | OUTPATIENT
Start: 2021-08-05 | End: 2021-08-07 | Stop reason: HOSPADM

## 2021-08-05 RX ORDER — SODIUM CHLORIDE 9 MG/ML
250 INJECTION, SOLUTION INTRAVENOUS AS NEEDED
Status: DISCONTINUED | OUTPATIENT
Start: 2021-08-05 | End: 2021-08-07 | Stop reason: HOSPADM

## 2021-08-05 RX ORDER — SODIUM CHLORIDE, SODIUM LACTATE, POTASSIUM CHLORIDE, CALCIUM CHLORIDE 600; 310; 30; 20 MG/100ML; MG/100ML; MG/100ML; MG/100ML
INJECTION, SOLUTION INTRAVENOUS
Status: DISCONTINUED | OUTPATIENT
Start: 2021-08-05 | End: 2021-08-05 | Stop reason: HOSPADM

## 2021-08-05 RX ORDER — MAGNESIUM SULFATE 100 %
4 CRYSTALS MISCELLANEOUS AS NEEDED
Status: DISCONTINUED | OUTPATIENT
Start: 2021-08-05 | End: 2021-08-07 | Stop reason: HOSPADM

## 2021-08-05 RX ORDER — ONDANSETRON 2 MG/ML
4 INJECTION INTRAMUSCULAR; INTRAVENOUS
Status: DISCONTINUED | OUTPATIENT
Start: 2021-08-05 | End: 2021-08-07 | Stop reason: HOSPADM

## 2021-08-05 RX ORDER — INSULIN LISPRO 100 [IU]/ML
INJECTION, SOLUTION INTRAVENOUS; SUBCUTANEOUS
Status: DISCONTINUED | OUTPATIENT
Start: 2021-08-05 | End: 2021-08-07 | Stop reason: HOSPADM

## 2021-08-05 RX ORDER — DEXTROSE 50 % IN WATER (D50W) INTRAVENOUS SYRINGE
25-50 AS NEEDED
Status: DISCONTINUED | OUTPATIENT
Start: 2021-08-05 | End: 2021-08-07 | Stop reason: HOSPADM

## 2021-08-05 RX ORDER — ZOLPIDEM TARTRATE 5 MG/1
5 TABLET ORAL
Status: DISCONTINUED | OUTPATIENT
Start: 2021-08-05 | End: 2021-08-07 | Stop reason: HOSPADM

## 2021-08-05 RX ORDER — GLYCOPYRROLATE 0.2 MG/ML
INJECTION INTRAMUSCULAR; INTRAVENOUS AS NEEDED
Status: DISCONTINUED | OUTPATIENT
Start: 2021-08-05 | End: 2021-08-05 | Stop reason: HOSPADM

## 2021-08-05 RX ORDER — SODIUM CHLORIDE 0.9 % (FLUSH) 0.9 %
5-40 SYRINGE (ML) INJECTION AS NEEDED
Status: DISCONTINUED | OUTPATIENT
Start: 2021-08-05 | End: 2021-08-07 | Stop reason: HOSPADM

## 2021-08-05 RX ORDER — SODIUM CHLORIDE 0.9 % (FLUSH) 0.9 %
5-40 SYRINGE (ML) INJECTION EVERY 8 HOURS
Status: DISCONTINUED | OUTPATIENT
Start: 2021-08-05 | End: 2021-08-07 | Stop reason: HOSPADM

## 2021-08-05 RX ORDER — SODIUM CHLORIDE 9 MG/ML
75 INJECTION, SOLUTION INTRAVENOUS CONTINUOUS
Status: DISCONTINUED | OUTPATIENT
Start: 2021-08-05 | End: 2021-08-05

## 2021-08-05 RX ORDER — PROPOFOL 10 MG/ML
INJECTION, EMULSION INTRAVENOUS AS NEEDED
Status: DISCONTINUED | OUTPATIENT
Start: 2021-08-05 | End: 2021-08-05 | Stop reason: HOSPADM

## 2021-08-05 RX ORDER — PANTOPRAZOLE SODIUM 40 MG/1
40 TABLET, DELAYED RELEASE ORAL DAILY
Status: DISCONTINUED | OUTPATIENT
Start: 2021-08-06 | End: 2021-08-06

## 2021-08-05 RX ORDER — LIDOCAINE HYDROCHLORIDE 20 MG/ML
INJECTION, SOLUTION EPIDURAL; INFILTRATION; INTRACAUDAL; PERINEURAL AS NEEDED
Status: DISCONTINUED | OUTPATIENT
Start: 2021-08-05 | End: 2021-08-05 | Stop reason: HOSPADM

## 2021-08-05 RX ORDER — PANTOPRAZOLE SODIUM 40 MG/1
40 TABLET, DELAYED RELEASE ORAL
Status: DISCONTINUED | OUTPATIENT
Start: 2021-08-05 | End: 2021-08-05

## 2021-08-05 RX ADMIN — SODIUM CHLORIDE 1000 ML: 9 INJECTION, SOLUTION INTRAVENOUS at 02:54

## 2021-08-05 RX ADMIN — SODIUM CHLORIDE, POTASSIUM CHLORIDE, SODIUM LACTATE AND CALCIUM CHLORIDE: 600; 310; 30; 20 INJECTION, SOLUTION INTRAVENOUS at 12:42

## 2021-08-05 RX ADMIN — SODIUM CHLORIDE 80 MG: 9 INJECTION INTRAMUSCULAR; INTRAVENOUS; SUBCUTANEOUS at 03:53

## 2021-08-05 RX ADMIN — Medication 10 ML: at 07:00

## 2021-08-05 RX ADMIN — IOPAMIDOL 100 ML: 755 INJECTION, SOLUTION INTRAVENOUS at 02:00

## 2021-08-05 RX ADMIN — SODIUM CHLORIDE, POTASSIUM CHLORIDE, SODIUM LACTATE AND CALCIUM CHLORIDE 150 ML/HR: 600; 310; 30; 20 INJECTION, SOLUTION INTRAVENOUS at 03:56

## 2021-08-05 RX ADMIN — Medication 10 ML: at 22:13

## 2021-08-05 RX ADMIN — LIDOCAINE HYDROCHLORIDE 40 MG: 20 INJECTION, SOLUTION EPIDURAL; INFILTRATION; INTRACAUDAL; PERINEURAL at 12:42

## 2021-08-05 RX ADMIN — SODIUM CHLORIDE 75 ML/HR: 9 INJECTION, SOLUTION INTRAVENOUS at 07:47

## 2021-08-05 RX ADMIN — GLYCOPYRROLATE 0.2 MG: 0.2 INJECTION, SOLUTION INTRAMUSCULAR; INTRAVENOUS at 12:42

## 2021-08-05 RX ADMIN — Medication 10 ML: at 13:46

## 2021-08-05 RX ADMIN — SODIUM CHLORIDE 40 MG: 9 INJECTION INTRAMUSCULAR; INTRAVENOUS; SUBCUTANEOUS at 10:50

## 2021-08-05 RX ADMIN — PROPOFOL 100 MG: 10 INJECTION, EMULSION INTRAVENOUS at 12:42

## 2021-08-05 RX ADMIN — PROPOFOL 60 MG: 10 INJECTION, EMULSION INTRAVENOUS at 12:48

## 2021-08-05 NOTE — PROGRESS NOTES
Reason for Admi%ssion:  GIB                     RUR Score:   13%                  Plan for utilizing home health:   Declined/uses no DME. PCP: First and Last name:  Billy Nazario MD     Name of Practice:    Are you a current patient: Yes/No: Yes   Approximate date of last visit: Been a while. Can you participate in a virtual visit with your PCP: Yes/Call                    Current Advanced Directive/Advance Care Plan: Full Code      Healthcare Decision Maker:                Primary Decision Maker: Mary Jane Dan - [facility] - [phone]                  Transition of Care Plan:   D/C Plan is home with (SP) Eluid Barajas & she will transport home upon discharge.

## 2021-08-05 NOTE — ANESTHESIA PREPROCEDURE EVALUATION
Relevant Problems   No relevant active problems       Anesthetic History   No history of anesthetic complications            Review of Systems / Medical History  Patient summary reviewed, nursing notes reviewed and pertinent labs reviewed    Pulmonary  Within defined limits                 Neuro/Psych   Within defined limits           Cardiovascular  Within defined limits                  Comments: Poems syndrome   GI/Hepatic/Renal  Within defined limits              Endo/Other        Obesity and anemia     Other Findings            Past Medical History:   Diagnosis Date    Chronic pain     bilateral feet,neuropathy    Neuropathy associated with endocrine disorder ([address] 3/11/2010    Other ill-defined conditions(799.89) 2001    POEMS Syndrome, seen at [address] [facility] POEMS syndrome 3/11/2010       Past Surgical History:   Procedure Laterality Date    BIOPSY LIVER      CELL COUNT, CSF      x 2    COLONOSCOPY      Upper and Lower     FOOT/TOES SURGERY PROC UNLISTED      Remove Nerve from Left foot     HX GI      liver stent    HX ORTHOPAEDIC      MRI BRAIN W WO CONT      STENT INSERTION      Liver to Kidneys        Current Outpatient Medications   Medication Instructions    carbinoxamine maleate (PALGIC) 4 mg, Oral, 2 TIMES DAILY    clotrimazole-betamethasone (LOTRISONE) topical cream Topical, 2 TIMES DAILY    diclofenac (VOLTAREN) 1 % gel Topical, 4 TIMES DAILY    methylphenidate HCl (METADATE CD) 40 mg, Oral, 7AM    pantoprazole (PROTONIX) 40 mg, Oral, DAILY    sildenafil citrate (VIAGRA) 100 mg, Oral, AS NEEDED    zolpidem (AMBIEN) 10 mg, Oral, BEDTIME PRN, Ok to fill 11/10/2015       Current Facility-Administered Medications   Medication Dose Route Frequency    0.9% sodium chloride infusion 250 mL  250 mL IntraVENous PRN    lactated Ringers infusion  100 mL/hr IntraVENous CONTINUOUS    zolpidem (AMBIEN) tablet 5 mg  5 mg Oral QHS PRN    sodium chloride (NS) flush 5-40 mL  5-40 mL IntraVENous Q8H    sodium chloride (NS) flush 5-40 mL  5-40 mL IntraVENous PRN    ondansetron (ZOFRAN) injection 4 mg  4 mg IntraVENous Q4H PRN    pantoprazole (PROTONIX) 40 mg in 0.9% sodium chloride 10 mL injection  40 mg IntraVENous BID    insulin lispro (HUMALOG) injection   SubCUTAneous AC&HS    glucose chewable tablet 16 g  4 Tablet Oral PRN    dextrose (D50W) injection syrg 12.5-25 g  25-50 mL IntraVENous PRN    glucagon (GLUCAGEN) injection 1 mg  1 mg IntraMUSCular PRN    0.9% sodium chloride infusion 250 mL  250 mL IntraVENous PRN    0.9% sodium chloride infusion 250 mL  250 mL IntraVENous PRN       Patient Vitals for the past 24 hrs:   Temp Pulse Resp BP SpO2   08/05/21 1200 -- 66 -- -- --   08/05/21 1113 36.6 °C (97.8 °F) (!) 59 17 (!) 95/57 99 %   08/05/21 0900 37.3 °C (99.2 °F) 61 16 (!) 91/54 100 %   08/05/21 0800 -- (!) 59 -- -- --   08/05/21 0700 37.2 °C (98.9 °F) 60 17 115/69 100 %   08/05/21 0531 36.5 °C (97.7 °F) 74 16 109/67 98 %   08/05/21 0441 37.1 °C (98.8 °F) 68 18 111/65 100 %   08/05/21 0355 37.1 °C (98.7 °F) 71 16 (!) 91/52 100 %   08/05/21 0340 36.7 °C (98.1 °F) 71 18 107/64 100 %   08/05/21 0317 36.7 °C (98.1 °F) 70 19 (!) 100/50 100 %   08/05/21 0257 -- 73 19 123/79 100 %   08/05/21 0052 36.5 °C (97.7 °F) 73 16 110/62 99 %       Lab Results   Component Value Date/Time    WBC 12.1 (H) 08/05/2021 01:00 AM    HGB 7.9 (L) 08/05/2021 10:40 AM    HCT 23.6 (L) 08/05/2021 01:00 AM    PLATELET 498 84/61/2518 01:00 AM    MCV 86.4 08/05/2021 01:00 AM     Lab Results   Component Value Date/Time    Sodium 140 08/05/2021 01:00 AM    Potassium 3.7 08/05/2021 01:00 AM    Chloride 106 08/05/2021 01:00 AM    CO2 27 08/05/2021 01:00 AM    Anion gap 7 08/05/2021 01:00 AM    Glucose 235 (H) 08/05/2021 01:00 AM    BUN 56 (H) 08/05/2021 01:00 AM    Creatinine 1.07 08/05/2021 01:00 AM    BUN/Creatinine ratio 52 (H) 08/05/2021 01:00 AM    GFR est AA >60 08/05/2021 01:00 AM    GFR est non-AA >60 08/05/2021 01:00 AM Calcium 7.6 (L) 08/05/2021 01:00 AM     Lab Results   Component Value Date/Time    PTP 16.1 (H) 08/05/2021 01:00 AM    INR 1.3 (H) 08/05/2021 01:00 AM     Lab Results   Component Value Date/Time    Glucose 235 (H) 08/05/2021 01:00 AM    Glucose (POC) 129 (H) 08/05/2021 11:18 AM     Physical Exam    Airway  Mallampati: II  TM Distance: 4 - 6 cm  Neck ROM: normal range of motion   Mouth opening: Normal     Cardiovascular    Rhythm: regular  Rate: normal         Dental  No notable dental hx       Pulmonary  Breath sounds clear to auscultation               Abdominal  GI exam deferred       Other Findings            Anesthetic Plan    ASA: 2  Anesthesia type: total IV anesthesia and MAC          Induction: Intravenous  Anesthetic plan and risks discussed with: Patient and Family

## 2021-08-05 NOTE — MED STUDENT NOTES
Hospitalist Progress Note               Daily Progress Note: 8/5/2021      Subjective:   Pk Lozano is a 55 y.o. male who presents with PMH significant for POEMS Syndrome to the ED for chief complaint of melena. Patient reports 2 episodes of \"dark, bloody\" bowel movements -- once in the afternoon, and once in the evening. Patient has also been experiencing dizziness that began yesterday, which is primarily triggered upon standing and \"looking down. \"  Sometime yesterday evening, patient had syncopal episode secondary to dizziness that was witnessed by the patient's wife. Patient additionally reports episode of hematemesis that occurred 2 days ago. Patient mentions eating \"shrimp\" 3 days ago and has since then had poor appetite. Patient additionally complains of chills and SOB. He denies any prior h/o dizziness, syncope, problems with bowel movements, hematemesis, diabetes, or GI bleed. Patient denies any tobacco smoking and reports occasional EtOH consumption. Patient currently denies any fever or abdominal pain.     Problem List:  Problem List as of 8/5/2021 Date Reviewed: 8/5/2021        Codes Class Noted - Resolved    GI hemorrhage ICD-10-CM: K92.2  ICD-9-CM: 578.9  8/5/2021 - Present        Severe obesity ([facility] [address] ICD-10-CM: E66.01  ICD-9-CM: 278.01  5/1/2019 - Present        ADD (attention deficit disorder) ICD-10-CM: F98.8  ICD-9-CM: 314.00  7/5/2017 - Present        Insomnia ICD-10-CM: G47.00  ICD-9-CM: 780.52  3/22/2012 - Present        POEMS syndrome (Chronic) ICD-10-CM: E88.09  ICD-9-CM: 273.9  3/11/2010 - Present        Neuropathy associated with endocrine disorder ([facility] [address] (Chronic) ICD-10-CM: E34.9, G63  ICD-9-CM: 355.9, 259.9  3/11/2010 - Present              Medications reviewed  Current Facility-Administered Medications   Medication Dose Route Frequency    0.9% sodium chloride infusion 250 mL  250 mL IntraVENous PRN    lactated Ringers infusion  100 mL/hr IntraVENous CONTINUOUS    zolpidem (AMBIEN) tablet 5 mg  5 mg Oral QHS PRN    sodium chloride (NS) flush 5-40 mL  5-40 mL IntraVENous Q8H    sodium chloride (NS) flush 5-40 mL  5-40 mL IntraVENous PRN    ondansetron (ZOFRAN) injection 4 mg  4 mg IntraVENous Q4H PRN    insulin lispro (HUMALOG) injection   SubCUTAneous AC&HS    glucose chewable tablet 16 g  4 Tablet Oral PRN    dextrose (D50W) injection syrg 12.5-25 g  25-50 mL IntraVENous PRN    glucagon (GLUCAGEN) injection 1 mg  1 mg IntraMUSCular PRN    0.9% sodium chloride infusion 250 mL  250 mL IntraVENous PRN    0.9% sodium chloride infusion 250 mL  250 mL IntraVENous PRN    [START ON 2021] pantoprazole (PROTONIX) tablet 40 mg  40 mg Oral DAILY       Review of Systems:   A comprehensive review of systems was negative except for that written in the HPI. Objective:   Physical Exam:     Visit Vitals  BP (P) 97/66   Pulse (P) 66   Temp 98.6 °F (37 °C)   Resp 23   Ht 5' 11\" (1.803 m)   Wt 122.5 kg (270 lb)   SpO2 (P) 98%   BMI 37.66 kg/m²      O2 Device: (P) None (Room air)    Temp (24hrs), Av.4 °F (36.9 °C), Min:97.7 °F (36.5 °C), Max:99.2 °F (37.3 °C)    701 - 1900  In: -   Out: 280 [Urine:280]   1901 -  07  In: 5068 [I.V.:1000]  Out: -     General:   Awake and alert   Lungs:   Clear to auscultation bilaterally. Chest wall:  No tenderness or deformity. Heart:  Regular rate and rhythm, S1, S2 normal, no murmur, click, rub or gallop. Abdomen:   Soft, non-tender. Bowel sounds normal. No masses,  No organomegaly. Extremities: Extremities normal, atraumatic, no cyanosis or edema. Pulses: 2+ and symmetric all extremities. Skin: Skin color, texture, turgor normal. No rashes or lesions   Neurologic: CNII-XII intact.   No gross focal deficits         Data Review:       Recent Days:  Recent Labs     21  1040 21  0100   WBC  --  12.1*   HGB 7.9* 7.4*   HCT  --  23.6*   PLT  --  203     Recent Labs     21  0100      K 3.7      CO2 27   *   BUN 56*   CREA 1.07   CA 7.6*   ALB 2.1*   TBILI 0.3   ALT 29   INR 1.3*     No results for input(s): PH, PCO2, PO2, HCO3, FIO2 in the last 72 hours. 24 Hour Results:  Recent Results (from the past 24 hour(s))   OCCULT BLOOD, STOOL    Collection Time: 08/05/21  1:00 AM   Result Value Ref Range    Occult Blood,day 1 Positive (A) Negative      Day 1 date: 8,052,021     CBC WITH AUTOMATED DIFF    Collection Time: 08/05/21  1:00 AM   Result Value Ref Range    WBC 12.1 (H) 4.1 - 11.1 K/uL    RBC 2.73 (L) 4.10 - 5.70 M/uL    HGB 7.4 (L) 12.1 - 17.0 g/dL    HCT 23.6 (L) 36.6 - 50.3 %    MCV 86.4 80.0 - 99.0 FL    MCH 27.1 26.0 - 34.0 PG    MCHC 31.4 30.0 - 36.5 g/dL    RDW 15.4 (H) 11.5 - 14.5 %    PLATELET 280 804 - 050 K/uL    MPV 11.7 8.9 - 12.9 FL    NRBC 0.0 0.0  WBC    ABSOLUTE NRBC 0.00 0.00 - 0.01 K/uL    NEUTROPHILS 87 (H) 32 - 75 %    LYMPHOCYTES 8 (L) 12 - 49 %    MONOCYTES 4 (L) 5 - 13 %    EOSINOPHILS 0 0 - 7 %    BASOPHILS 0 0 - 1 %    IMMATURE GRANULOCYTES 1 (H) 0 - 0.5 %    ABS. NEUTROPHILS 10.5 (H) 1.8 - 8.0 K/UL    ABS. LYMPHOCYTES 1.0 0.8 - 3.5 K/UL    ABS. MONOCYTES 0.5 0.0 - 1.0 K/UL    ABS. EOSINOPHILS 0.0 0.0 - 0.4 K/UL    ABS. BASOPHILS 0.1 0.0 - 0.1 K/UL    ABS. IMM. GRANS. 0.1 (H) 0.00 - 0.04 K/UL    DF AUTOMATED     METABOLIC PANEL, COMPREHENSIVE    Collection Time: 08/05/21  1:00 AM   Result Value Ref Range    Sodium 140 136 - 145 mmol/L    Potassium 3.7 3.5 - 5.1 mmol/L    Chloride 106 97 - 108 mmol/L    CO2 27 21 - 32 mmol/L    Anion gap 7 5 - 15 mmol/L    Glucose 235 (H) 65 - 100 mg/dL    BUN 56 (H) 6 - 20 mg/dL    Creatinine 1.07 0.70 - 1.30 mg/dL    BUN/Creatinine ratio 52 (H) 12 - 20      GFR est AA >60 >60 ml/min/1.73m2    GFR est non-AA >60 >60 ml/min/1.73m2    Calcium 7.6 (L) 8.5 - 10.1 mg/dL    Bilirubin, total 0.3 0.2 - 1.0 mg/dL    AST (SGOT) 18 15 - 37 U/L    ALT (SGPT) 29 12 - 78 U/L    Alk.  phosphatase 66 45 - 117 U/L    Protein, total 4.7 (L) 6.4 - 8.2 g/dL    Albumin 2.1 (L) 3.5 - 5.0 g/dL    Globulin 2.6 2.0 - 4.0 g/dL    A-G Ratio 0.8 (L) 1.1 - 2.2     TYPE & SCREEN    Collection Time: 08/05/21  1:00 AM   Result Value Ref Range    Crossmatch Expiration 08/08/2021,2359     ABO/Rh(D) A Positive     Antibody screen Negative     Unit number [id number]     Blood component type [facility]     Unit division 00     Status of unit Issued     [address] to transfuse     Crossmatch result Compatible     Unit number [id number]     Blood component type [facility]     Unit division 00     Status of unit [address] to transfuse     Crossmatch result Compatible     Unit number [id number]     Blood component type [facility]     Unit division 00     Status of unit [address] to transfuse     Crossmatch result Compatible    PROTHROMBIN TIME + INR    Collection Time: 08/05/21  1:00 AM   Result Value Ref Range    Prothrombin time 16.1 (H) 11.9 - 14.7 sec    INR 1.3 (H) 0.9 - 1.1     COVID-19 RAPID TEST    Collection Time: 08/05/21  3:01 AM   Result Value Ref Range    Specimen source Nasopharyngeal      COVID-19 rapid test Not Detected Not Detected     EKG, 12 LEAD, INITIAL    Collection Time: 08/05/21  3:14 AM   Result Value Ref Range    Ventricular Rate 68 BPM    Atrial Rate 68 BPM    P-R Interval 148 ms    QRS Duration 114 ms    Q-T Interval 462 ms    QTC Calculation (Bezet) 491 ms    Calculated P Axis 28 degrees    Calculated R Axis 56 degrees    Calculated T Axis 105 degrees    Diagnosis       Normal sinus rhythm  T wave abnormality, consider lateral ischemia  Abnormal ECG  No previous ECGs available  Confirmed by Zach Julian ([id number]) on 8/5/2021 9:35:02 AM     GLUCOSE, POC    Collection Time: 08/05/21  7:06 AM   Result Value Ref Range    Glucose (POC) 120 (H) 65 - 117 mg/dL    Performed by Charanjit Guzman, POC    Collection Time: 08/05/21  9:04 AM   Result Value Ref Range    Glucose (POC) 135 (H) 65 - 117 mg/dL    Performed by [id number]    Collection Time: 08/05/21 10:40 AM   Result Value Ref Range    HGB 7.9 (L) 12.1 - 17.0 g/dL   GLUCOSE, POC    Collection Time: 08/05/21 11:18 AM   Result Value Ref Range    Glucose (POC) 129 (H) 65 - 117 mg/dL    Performed by Rita Jordon    MRSA SCREEN - PCR (NASAL)    Collection Time: 08/05/21 11:40 AM   Result Value Ref Range    MRSA by PCR, Nasal DETECTED (A) Not Detected         CTA ABDOMEN PELV W CONT   Final Result      No contrast extravasation. No inflammatory changes or bowel obstruction. Renal   lesions are too small to characterize. Follow-up as clinically indicated. Please   see full report. Assessment:  Acute upper GI bleed given new onset of melena, dizziness, hematemesis, and syncopal episode. Acute blood loss anemia    POEMS Syndrome    Plan:  Seen by GI; EGD reveals reflux esophagitis. Continue to monitor hemoglobin and volume status. Fluids and IV Protonix. Transfuse 2 units of blood in light of initial hypotension and syncope  Likely discharge in 24 hours    Care Plan discussed with: Patient/Family    Disposition: discharge home 24 hours. Total time spent with patient: 30 minutes.     Agatha Mcintyre MD

## 2021-08-05 NOTE — ED NOTES
Bedside shift change report given to hiwot Cabrera  (oncoming nurse) by mayra rn  (offgoing nurse). Report included the following information SBAR, Kardex, ED Summary, [facility] and Recent Results.

## 2021-08-05 NOTE — ANESTHESIA POSTPROCEDURE EVALUATION
Procedure(s):  ESOPHAGOGASTRODUODENOSCOPY (EGD).     total IV anesthesia, MAC    Anesthesia Post Evaluation      Multimodal analgesia: multimodal analgesia not used between 6 hours prior to anesthesia start to PACU discharge  Patient location during evaluation: bedside  Patient participation: complete - patient cannot participate  Level of consciousness: lethargic  Pain score: 0  Pain management: adequate  Airway patency: patent  Anesthetic complications: no  Cardiovascular status: acceptable  Respiratory status: acceptable  Hydration status: acceptable  Post anesthesia nausea and vomiting:  none  Final Post Anesthesia Temperature Assessment:  Normothermia (36.0-37.5 degrees C)      INITIAL Post-op Vital signs:   Vitals Value Taken Time   /72 08/05/21 1251   Temp 37 °C (98.6 °F) 08/05/21 1251   Pulse 65 08/05/21 1251   Resp 23 08/05/21 1251   SpO2 98 % 08/05/21 1251

## 2021-08-05 NOTE — MED STUDENT NOTES
Hospitalist Progress Note               Daily Progress Note: 8/5/2021      Subjective:   Kenia Davis is a 55 y.o. male who presents with PMH significant for POEMS Syndrome to the ED for chief complaint of melena. Patient reports 2 episodes of \"dark, bloody\" bowel movements -- once in the afternoon, and once in the evening. Patient has also been experiencing dizziness that began yesterday, which is primarily triggered upon standing and \"looking down. \"  Sometime yesterday evening, patient had syncopal episode secondary to dizziness that was witnessed by the patient's wife. Patient additionally reports episode of hematemesis that occurred 2 days ago. Patient mentions eating \"shrimp\" 3 days ago and has since then had poor appetite. Patient additionally complains of chills and SOB. He denies any prior h/o dizziness, syncope, problems with bowel movements, hematemesis, diabetes, or GI bleed. Patient denies any tobacco smoking and reports occasional EtOH consumption. Patient currently denies any fever or abdominal pain.     Problem List:  Problem List as of 8/5/2021 Date Reviewed: 8/5/2021        Codes Class Noted - Resolved    GI hemorrhage ICD-10-CM: K92.2  ICD-9-CM: 578.9  8/5/2021 - Present        Severe obesity ([facility] [address] ICD-10-CM: E66.01  ICD-9-CM: 278.01  5/1/2019 - Present        ADD (attention deficit disorder) ICD-10-CM: F98.8  ICD-9-CM: 314.00  7/5/2017 - Present        Insomnia ICD-10-CM: G47.00  ICD-9-CM: 780.52  3/22/2012 - Present        POEMS syndrome (Chronic) ICD-10-CM: E88.09  ICD-9-CM: 273.9  3/11/2010 - Present        Neuropathy associated with endocrine disorder ([facility] [address] (Chronic) ICD-10-CM: E34.9, G63  ICD-9-CM: 355.9, 259.9  3/11/2010 - Present              Medications reviewed  Current Facility-Administered Medications   Medication Dose Route Frequency    0.9% sodium chloride infusion 250 mL  250 mL IntraVENous PRN    lactated Ringers infusion  100 mL/hr IntraVENous CONTINUOUS    pantoprazole (PROTONIX) tablet 40 mg  40 mg Oral ACB    zolpidem (AMBIEN) tablet 5 mg  5 mg Oral QHS PRN    sodium chloride (NS) flush 5-40 mL  5-40 mL IntraVENous Q8H    sodium chloride (NS) flush 5-40 mL  5-40 mL IntraVENous PRN    ondansetron (ZOFRAN) injection 4 mg  4 mg IntraVENous Q4H PRN    pantoprazole (PROTONIX) 40 mg in 0.9% sodium chloride 10 mL injection  40 mg IntraVENous BID    insulin lispro (HUMALOG) injection   SubCUTAneous AC&HS    glucose chewable tablet 16 g  4 Tablet Oral PRN    dextrose (D50W) injection syrg 12.5-25 g  25-50 mL IntraVENous PRN    glucagon (GLUCAGEN) injection 1 mg  1 mg IntraMUSCular PRN    0.9% sodium chloride infusion 250 mL  250 mL IntraVENous PRN    0.9% sodium chloride infusion 250 mL  250 mL IntraVENous PRN       Review of Systems:   A comprehensive review of systems was negative except for that written in the HPI. Objective:   Physical Exam:     Visit Vitals  BP (!) 91/54 (BP 1 Location: Left upper arm, BP Patient Position: At rest)   Pulse 61   Temp 99.2 °F (37.3 °C)   Resp 16   Ht 5' 11\" (1.803 m)   Wt 270 lb (122.5 kg)   SpO2 100%   BMI 37.66 kg/m²      O2 Device: None    Temp (24hrs), Av.4 °F (36.9 °C), Min:97.7 °F (36.5 °C), Max:99.2 °F (37.3 °C)    No intake/output data recorded.  1901 -  0700  In: 1620 [I.V.:1000]  Out: -     General:   Awake and alert   Lungs:   Clear to auscultation bilaterally. Chest wall:  No tenderness or deformity. Heart:  Regular rate and rhythm, S1, S2 normal, no murmur, click, rub or gallop. Abdomen:   Soft, non-tender. Bowel sounds normal. No masses,  No organomegaly. Extremities: Extremities normal, atraumatic, no cyanosis or edema. Pulses: 2+ and symmetric all extremities. Skin: Skin color, texture, turgor normal. No rashes or lesions   Neurologic: CNII-XII intact.   No gross focal deficits         Data Review:       Recent Days:  Recent Labs     21  0100   WBC 12.1*   HGB 7.4*   HCT 23.6*      Recent Labs     08/05/21  0100      K 3.7      CO2 27   *   BUN 56*   CREA 1.07   CA 7.6*   ALB 2.1*   TBILI 0.3   ALT 29   INR 1.3*     No results for input(s): PH, PCO2, PO2, HCO3, FIO2 in the last 72 hours. 24 Hour Results:  Recent Results (from the past 24 hour(s))   OCCULT BLOOD, STOOL    Collection Time: 08/05/21  1:00 AM   Result Value Ref Range    Occult Blood,day 1 Positive (A) Negative      Day 1 date: 8,052,021     CBC WITH AUTOMATED DIFF    Collection Time: 08/05/21  1:00 AM   Result Value Ref Range    WBC 12.1 (H) 4.1 - 11.1 K/uL    RBC 2.73 (L) 4.10 - 5.70 M/uL    HGB 7.4 (L) 12.1 - 17.0 g/dL    HCT 23.6 (L) 36.6 - 50.3 %    MCV 86.4 80.0 - 99.0 FL    MCH 27.1 26.0 - 34.0 PG    MCHC 31.4 30.0 - 36.5 g/dL    RDW 15.4 (H) 11.5 - 14.5 %    PLATELET 873 547 - 925 K/uL    MPV 11.7 8.9 - 12.9 FL    NRBC 0.0 0.0  WBC    ABSOLUTE NRBC 0.00 0.00 - 0.01 K/uL    NEUTROPHILS 87 (H) 32 - 75 %    LYMPHOCYTES 8 (L) 12 - 49 %    MONOCYTES 4 (L) 5 - 13 %    EOSINOPHILS 0 0 - 7 %    BASOPHILS 0 0 - 1 %    IMMATURE GRANULOCYTES 1 (H) 0 - 0.5 %    ABS. NEUTROPHILS 10.5 (H) 1.8 - 8.0 K/UL    ABS. LYMPHOCYTES 1.0 0.8 - 3.5 K/UL    ABS. MONOCYTES 0.5 0.0 - 1.0 K/UL    ABS. EOSINOPHILS 0.0 0.0 - 0.4 K/UL    ABS. BASOPHILS 0.1 0.0 - 0.1 K/UL    ABS. IMM.  GRANS. 0.1 (H) 0.00 - 0.04 K/UL    DF AUTOMATED     METABOLIC PANEL, COMPREHENSIVE    Collection Time: 08/05/21  1:00 AM   Result Value Ref Range    Sodium 140 136 - 145 mmol/L    Potassium 3.7 3.5 - 5.1 mmol/L    Chloride 106 97 - 108 mmol/L    CO2 27 21 - 32 mmol/L    Anion gap 7 5 - 15 mmol/L    Glucose 235 (H) 65 - 100 mg/dL    BUN 56 (H) 6 - 20 mg/dL    Creatinine 1.07 0.70 - 1.30 mg/dL    BUN/Creatinine ratio 52 (H) 12 - 20      GFR est AA >60 >60 ml/min/1.73m2    GFR est non-AA >60 >60 ml/min/1.73m2    Calcium 7.6 (L) 8.5 - 10.1 mg/dL    Bilirubin, total 0.3 0.2 - 1.0 mg/dL    AST (SGOT) 18 15 - 37 U/L    ALT (SGPT) 29 12 - 78 U/L    Alk. phosphatase 66 45 - 117 U/L    Protein, total 4.7 (L) 6.4 - 8.2 g/dL    Albumin 2.1 (L) 3.5 - 5.0 g/dL    Globulin 2.6 2.0 - 4.0 g/dL    A-G Ratio 0.8 (L) 1.1 - 2.2     TYPE & SCREEN    Collection Time: 08/05/21  1:00 AM   Result Value Ref Range    Crossmatch Expiration 08/08/2021,2359     ABO/Rh(D) A Positive     Antibody screen Negative     Unit number [id number]     Blood component type [facility]     Unit division 00     Status of unit Issued     [address] to transfuse     Crossmatch result Compatible     Unit number [id number]     Blood component type [facility]     Unit division 00     Status of unit [address] to transfuse     Crossmatch result Compatible    PROTHROMBIN TIME + INR    Collection Time: 08/05/21  1:00 AM   Result Value Ref Range    Prothrombin time 16.1 (H) 11.9 - 14.7 sec    INR 1.3 (H) 0.9 - 1.1     COVID-19 RAPID TEST    Collection Time: 08/05/21  3:01 AM   Result Value Ref Range    Specimen source Nasopharyngeal      COVID-19 rapid test Not Detected Not Detected     GLUCOSE, POC    Collection Time: 08/05/21  7:06 AM   Result Value Ref Range    Glucose (POC) 120 (H) 65 - 117 mg/dL    Performed by Charanjit Guzman, POC    Collection Time: 08/05/21  9:04 AM   Result Value Ref Range    Glucose (POC) 135 (H) 65 - 117 mg/dL    Performed by Solitario Jefferson        CTA ABDOMEN PELV W CONT   Final Result      No contrast extravasation. No inflammatory changes or bowel obstruction. Renal   lesions are too small to characterize. Follow-up as clinically indicated. Please   see full report. Assessment:  Consider upper GI bleed given new onset of melena, dizziness, hematemesis, and syncopal episode. POEMS Syndrome    Plan:  Seen by GI; EGD reveals reflux esophagitis. Continue to monitor hemoglobin and volume status. Fluids and IV Protonix. Care Plan discussed with: Patient/Family    Disposition: discharge home.     Total time spent with patient: 30 minutes.     Liang Maynard

## 2021-08-05 NOTE — H&P
History and Physical              Subjective :   Chief Complaint : Blood in the stool for 1 day, severe fatigue. Syncopal episode today just before the arrival to the emergency room. Source of information : Patient sleepy and answering only simple questions. History of present illness:   55 y.o. 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 It started 2 days ago, not getting any better. Started feeling very weak, tired, dizzy lightheaded and actually had an episode of syncope while he was using the restroom. He found with severe hypotension when EMS arrived. Blood sugars were very low, was given 250 mm bag of D10 with improvement in blood sugars to 208. He had visible black stool with foul order and stained in his bottom and legs. On arrival to the emergency room he was looking very pale, weak. He is given IV Protonix and 1 unit of packed red blood cells he was just finishing when I seen him. He is sleeping comfortably in the bed, did not have any more episodes of diarrhea. He is not very happy to wake up but was appropriately answering.     Past Medical History:   Diagnosis Date    Chronic pain     bilateral feet,neuropathy    Neuropathy associated with endocrine disorder ([facility] [address] 3/11/2010    Other ill-defined conditions(799.89) 2001    POEMS Syndrome, seen at [place] [id number] syndrome 3/11/2010     Past Surgical History:   Procedure Laterality Date    BIOPSY LIVER      CELL COUNT, CSF      x 2    COLONOSCOPY      Upper and Lower     FOOT/TOES SURGERY PROC UNLISTED      Remove Nerve from Left foot     HX GI      liver stent    HX ORTHOPAEDIC      MRI BRAIN W WO CONT      STENT INSERTION      Liver to Kidneys      Family History   Family history unknown: Yes      Social History     Tobacco Use    Smoking status: Never Smoker    Smokeless tobacco: Never Used   Substance Use Topics    Alcohol use: Yes       Prior to Admission medications    Medication Sig Start Date End Date Taking? Authorizing Provider   methylphenidate HCl (Metadate CD) 40 mg BP30 Take 40 mg by mouth every morning. Max Daily Amount: 40 mg. 4/2/20   Maribel Dorman NP   carbinoxamine maleate (PALGIC) 4 mg tab Take 4 mg by mouth two (2) times a day. 4/1/20   Jose Hunt MD   zolpidem (AMBIEN) 10 mg tablet Take 1 Tab by mouth nightly as needed for Sleep. Ok to fill 11/10/2015 3/24/20   Jose Hunt MD   pantoprazole (PROTONIX) 40 mg tablet Take 1 Tab by mouth daily. 11/27/19   Jose Hunt MD   diclofenac (VOLTAREN) 1 % gel Apply  to affected area four (4) times daily. 2/23/17   Jose Hunt MD   sildenafil citrate (VIAGRA) 100 mg tablet Take 1 Tab by mouth as needed. 10/9/15   Bethel Jacobs NP   clotrimazole-betamethasone (LOTRISONE) topical cream Apply  to affected area two (2) times a day. 7/1/15   Jose Hunt MD     Allergies   Allergen Reactions    Heparin (Bovine) Other (comments)     Questionable per patient ,back lesions. Review of Systems:  Constitutional: Appetite is usually very good, no weight loss. No fever, no chills, no night sweats. Eye: No recent visual disturbances, no discharge, no double vision. Ear/nose/mouth/throat : No hearing disturbance, no ear pain, no nasal congestion, no sore throat, no trouble swallowing. Respiratory : No trouble breathing, no cough, no shortness of breath, no hemoptysis, no wheezing. Cardiovascular : No chest pain, no palpitation,  no orthopnea, no peripheral edema. Gastrointestinal : No nausea, no vomiting, No heartburn, No abdominal pain. Genitourinary : No dysuria, no hematuria, no increased frequency, No incontinence.   Lymphatics : No swollen glands -Neck, axillary, inguinal.  Endocrine : No excessive thirst, No polyuria No cold intolerance, No heat intolerance. Immunologic : No seasonal allergies. Musculoskeletal : No joint swelling, No pain, No effusion,  No back pain, No neck pain. Integumentary : No rash, No pruritus, No ecchymosis. Hematology : No history of bleeding disorders. No petechiae, No easy bruising,  No tendency to bleed easy. Neurology : Denies change in mental status, No headache, No confusion, No numbness or tingling. Psychiatric : No mood swings, No anxiety, No depression. Vitals:     Patient Vitals for the past 12 hrs:   Temp Pulse Resp BP SpO2   08/05/21 0531 97.7 °F (36.5 °C) 74 16 109/67 98 %   08/05/21 0441 98.8 °F (37.1 °C) 68 18 111/65 100 %   08/05/21 0355 98.7 °F (37.1 °C) 71 16 (!) 91/52 100 %   08/05/21 0340 98.1 °F (36.7 °C) 71 18 107/64 100 %   08/05/21 0317 98.1 °F (36.7 °C) 70 19 (!) 100/50 100 %   08/05/21 0257 -- 73 19 123/79 100 %   08/05/21 0052 97.7 °F (36.5 °C) 73 16 110/62 99 %       Physical Exam:   General : Looks tired, but no respiratory distress noted. HEENT : PERRLA, pale and dry oral mucosa. atraumatic normocephalic, Normal ear and nose. Neck : Supple, no JVD, no masses noted, no carotid bruit. Lungs : Breath sounds with good air entry bilaterally, no wheezes or rales, no accessory muscle use. CVS : Rhythm rate regular, S1+, S2+, no murmur or gallop. Abdomen : Soft, nontender,  bowel sounds active. Extremities : No edema noted,  pedal pulses palpable. Musculoskeletal : Fair range of motion, no joint swelling or effusion, muscle tone and power appears normal.   Skin : Moist, warm,  no pathological rash. Lymphatic : No cervical lymphadenopathy. Neurological : Awake, alert, oriented to time place person. Psychiatric : Mood and affect appears appropriate to the situation.        Data Review:   Recent Results (from the past 24 hour(s))   OCCULT BLOOD, STOOL    Collection Time: 08/05/21  1:00 AM   Result Value Ref Range    Occult Blood,day 1 Positive (A) Negative      Day 1 date: 8,052,021     CBC WITH AUTOMATED DIFF    Collection Time: 08/05/21  1:00 AM   Result Value Ref Range    WBC 12.1 (H) 4.1 - 11.1 K/uL    RBC 2.73 (L) 4.10 - 5.70 M/uL    HGB 7.4 (L) 12.1 - 17.0 g/dL    HCT 23.6 (L) 36.6 - 50.3 %    MCV 86.4 80.0 - 99.0 FL    MCH 27.1 26.0 - 34.0 PG    MCHC 31.4 30.0 - 36.5 g/dL    RDW 15.4 (H) 11.5 - 14.5 %    PLATELET 119 956 - 333 K/uL    MPV 11.7 8.9 - 12.9 FL    NRBC 0.0 0.0  WBC    ABSOLUTE NRBC 0.00 0.00 - 0.01 K/uL    NEUTROPHILS 87 (H) 32 - 75 %    LYMPHOCYTES 8 (L) 12 - 49 %    MONOCYTES 4 (L) 5 - 13 %    EOSINOPHILS 0 0 - 7 %    BASOPHILS 0 0 - 1 %    IMMATURE GRANULOCYTES 1 (H) 0 - 0.5 %    ABS. NEUTROPHILS 10.5 (H) 1.8 - 8.0 K/UL    ABS. LYMPHOCYTES 1.0 0.8 - 3.5 K/UL    ABS. MONOCYTES 0.5 0.0 - 1.0 K/UL    ABS. EOSINOPHILS 0.0 0.0 - 0.4 K/UL    ABS. BASOPHILS 0.1 0.0 - 0.1 K/UL    ABS. IMM. GRANS. 0.1 (H) 0.00 - 0.04 K/UL    DF AUTOMATED     METABOLIC PANEL, COMPREHENSIVE    Collection Time: 08/05/21  1:00 AM   Result Value Ref Range    Sodium 140 136 - 145 mmol/L    Potassium 3.7 3.5 - 5.1 mmol/L    Chloride 106 97 - 108 mmol/L    CO2 27 21 - 32 mmol/L    Anion gap 7 5 - 15 mmol/L    Glucose 235 (H) 65 - 100 mg/dL    BUN 56 (H) 6 - 20 mg/dL    Creatinine 1.07 0.70 - 1.30 mg/dL    BUN/Creatinine ratio 52 (H) 12 - 20      GFR est AA >60 >60 ml/min/1.73m2    GFR est non-AA >60 >60 ml/min/1.73m2    Calcium 7.6 (L) 8.5 - 10.1 mg/dL    Bilirubin, total 0.3 0.2 - 1.0 mg/dL    AST (SGOT) 18 15 - 37 U/L    ALT (SGPT) 29 12 - 78 U/L    Alk.  phosphatase 66 45 - 117 U/L    Protein, total 4.7 (L) 6.4 - 8.2 g/dL    Albumin 2.1 (L) 3.5 - 5.0 g/dL    Globulin 2.6 2.0 - 4.0 g/dL    A-G Ratio 0.8 (L) 1.1 - 2.2     TYPE & SCREEN    Collection Time: 08/05/21  1:00 AM   Result Value Ref Range    Crossmatch Expiration 08/08/2021,5021     ABO/Rh(D) A Positive     Antibody screen Negative     Unit number [id number]     Blood component type [facility]     Unit division 00 Status of unit [address] to transfuse     Crossmatch result Compatible    PROTHROMBIN TIME + INR    Collection Time: 08/05/21  1:00 AM   Result Value Ref Range    Prothrombin time 16.1 (H) 11.9 - 14.7 sec    INR 1.3 (H) 0.9 - 1.1     COVID-19 RAPID TEST    Collection Time: 08/05/21  3:01 AM   Result Value Ref Range    Specimen source Nasopharyngeal      COVID-19 rapid test Not Detected Not Detected         Radiologic Studies :   CT Results  (Last 48 hours)               08/05/21 0257  CTA ABDOMEN PELV W CONT Final result    Impression:      No contrast extravasation. No inflammatory changes or bowel obstruction. Renal   lesions are too small to characterize. Follow-up as clinically indicated. Please   see full report. Narrative:  CTA abdomen and pelvis without and with intravenous contrast, 100 cc [id number],   3-D MIP images       Dose reduction: All CT scans at this facility are performed using dose reduction   optimization techniques as appropriate to a performed exam including the   following: Automated exposure control, adjustments of the mA and/or kV according   to patient size, or use of iterative reconstruction technique. INDICATION: GI bleed       FINDINGS:       No aneurysm or dissection of abdominal aorta. Celiac trunk, SMA and renal   arteries are patent. No contrast extravasation. Mild atherosclerosis. No airspace disease in the lung bases. Low density blood in the ventricles may   represent anemia. Liver, spleen and pancreas are unremarkable. Gallbladder is   contracted without pericholecystic stranding. TIPS. No urinary stone or   hydronephrosis on either side. 18 mm left renal lesion posteriorly is too small   to characterize, may represent a cyst. Additional bilateral renal lesions are   too small to characterize. No bowel obstruction. Normal appendix. No evidence of diverticulitis. No   abscess. No free intraperitoneal air.  Prostate and seminal vesicles appear   unremarkable based on CT criteria. No acute fracture in the visualized bony   structures. Small lucency body of L3 may represent a hemangioma. Tiny umbilical   defect containing fat. Probable small right inguinal hernia containing fat. Assessment and Plan :     Acute GI hemorrhage: Looks like upper GI, but cannot rule out lower GI also. He is already received Protonix, consultation requested to gastroenterology and will follow with recommendations    Acute posthemorrhagic anemia secondary to above, received 1 unit of blood transfusion, we will repeat 1 more unit. We will closely monitor with her H&H every 8 hours. Elevated blood sugars with no history of diabetes, secondary to receiving D10 today before no blood sugars. We will monitor closely    History of POEMS syndrome, patient also regularly followed up. History of significant neuropathy on treatment    Patient condition is stable at this time but risk of decompensation is high, need very close monitoring. Already had a CTA ordered in the emergency room that was negative for any acute bleed. Differential diagnoses include gastric etiology for bleeding or diverticular, but black stools appears secondary to upper GI bleed. Need to make sure there is no blood dyscrasias on this patient due to his past medical history. Admitted to medical telemetry, full CODE STATUS, home medications reviewed and verified. Has no advance medical directives      CC : Crystal Fermin MD  Signed By: Henry Fong MD     August 5, 2021      This dictation was done by dragon, computer voice recognition software. Often unanticipated grammatical, syntax, [place] phones and other interpretive errors are inadvertently transcribed. Please excuse errors that have escaped final proofreading.

## 2021-08-05 NOTE — PROGRESS NOTES
Problem: Falls - Risk of  Goal: *Absence of Falls  Description: Document Rosa M Perkins Fall Risk and appropriate interventions in the flowsheet.   Outcome: Progressing Towards Goal  Note: Fall Risk Interventions:            Medication Interventions: Bed/chair exit alarm, Patient to call before getting OOB, Teach patient to arise slowly    Elimination Interventions: Bed/chair exit alarm, Call light in reach, Patient to call for help with toileting needs, Urinal in reach    History of Falls Interventions: Bed/chair exit alarm, Door open when patient unattended

## 2021-08-05 NOTE — ED PROVIDER NOTES
EMERGENCY DEPARTMENT HISTORY AND PHYSICAL EXAM      Date: 8/5/2021  Patient Name: Xin Horn    History of Presenting Illness     Chief Complaint   Patient presents with    Rectal Bleeding       History Provided By: Patient    HPI: Xin Horn, 55 y.o. male with a past medical history of POEMS syndrome presents to the ED for GI bleed. Patient noted a few episodes of melena at home and then had an episode of hematemesis. He became lightheaded dizzy and had an episode of syncope in the bathroom. The wife witnessed the syncope. There was no trauma. He was altered at the time. EMS noted hypotension and hypoglycemia that read \"low\" and was given 1 amp of D50 and his glucose went up to 204. He reported that he has been \"bloated\" for the past few days and has not been eating or drinking. He denies any abdominal pain. He reports some congestion with subjective chills but no fevers. Denies any chest pain or shortness of breath. Denies any orthopnea or lower extremity swelling. Does not take any anticoagulation. Denies any alcohol use. Patient has received 2 doses of the COVID-19 vaccine. There are no other complaints, changes, or physical findings at this time.     PCP: Quintin Brush MD    Current Facility-Administered Medications   Medication Dose Route Frequency Provider Last Rate Last Admin    0.9% sodium chloride infusion 250 mL  250 mL IntraVENous PRN Benjamín Cooley MD        lactated Ringers infusion  150 mL/hr IntraVENous CONTINUOUS Benjamín Cooley  mL/hr at 08/05/21 0356 150 mL/hr at 08/05/21 0356    pantoprazole (PROTONIX) tablet 40 mg  40 mg Oral ACB Oriana Colbert MD        zolpidem (AMBIEN) tablet 5 mg  5 mg Oral QHS PRN Oriana Colbert MD        0.9% sodium chloride infusion  75 mL/hr IntraVENous CONTINUOUS Oriana Colbert MD 75 mL/hr at 08/05/21 0747 75 mL/hr at 08/05/21 0747    sodium chloride (NS) flush 5-40 mL  5-40 mL IntraVENous Q8H Deangelo Nelson Holden MD   10 mL at 08/05/21 0700    sodium chloride (NS) flush 5-40 mL  5-40 mL IntraVENous PRN Jacqueline Decker MD        [facility] 4 mg  4 mg IntraVENous Q4H PRN Jacqueline Decker MD        pantoprazole (PROTONIX) 40 mg in 0.9% sodium chloride 10 mL injection  40 mg IntraVENous BID Jacqueline Decker MD        insulin lispro (HUMALOG) injection   SubCUTAneous AC&HS Jacqueline Decker MD        glucose chewable tablet 16 g  4 Tablet Oral PRN Jacqueline Decker MD        dextrose (D50W) injection syrg 12.5-25 g  25-50 mL IntraVENous PRN Jacqueline Decker MD        glucagon (GLUCAGEN) injection 1 mg  1 mg IntraMUSCular PRN Jacqueline Decker MD         Current Outpatient Medications   Medication Sig Dispense Refill    methylphenidate HCl (Metadate CD) 40 mg BP30 Take 40 mg by mouth every morning. Max Daily Amount: 40 mg. 90 Cap 0    carbinoxamine maleate (PALGIC) 4 mg tab Take 4 mg by mouth two (2) times a day. 180 Tab 3    zolpidem (AMBIEN) 10 mg tablet Take 1 Tab by mouth nightly as needed for Sleep. Ok to fill 11/10/2015 90 Tab 3    pantoprazole (PROTONIX) 40 mg tablet Take 1 Tab by mouth daily. 90 Tab 3    diclofenac (VOLTAREN) 1 % gel Apply  to affected area four (4) times daily. 100 g 1    sildenafil citrate (VIAGRA) 100 mg tablet Take 1 Tab by mouth as needed. 12 Tab 3    clotrimazole-betamethasone (LOTRISONE) topical cream Apply  to affected area two (2) times a day.  60 g 1       Past History   I reviewed the past medical hx, surgical hx, family hx, social hx, and allergy information and are listed here:    Past Medical History:  Past Medical History:   Diagnosis Date    Chronic pain     bilateral feet,neuropathy    Neuropathy associated with endocrine disorder ([facility] [address] 3/11/2010    Other ill-defined conditions(799.89) 2001    POEMS Syndrome, seen at [facility] POEMS syndrome 3/11/2010       Past Surgical History:  Past Surgical History:   Procedure Laterality Date    BIOPSY LIVER      CELL COUNT, CSF      x 2    COLONOSCOPY      Upper and Lower     FOOT/TOES SURGERY PROC UNLISTED      Remove Nerve from Left foot     HX GI      liver stent    HX ORTHOPAEDIC      MRI BRAIN W WO CONT      STENT INSERTION      Liver to Kidneys        Family History:  Family History   Family history unknown: Yes       Social History:  Social History     Tobacco Use    Smoking status: Never Smoker    Smokeless tobacco: Never Used   Substance Use Topics    Alcohol use: Yes    Drug use: No       Allergies: Allergies   Allergen Reactions    Heparin (Bovine) Other (comments)     Questionable per patient ,back lesions. Review of Systems     Review of Systems   Constitutional: Negative for chills and fever. HENT: Positive for congestion and rhinorrhea. Negative for sore throat. Eyes: Negative. Respiratory: Negative for cough, shortness of breath and wheezing. Cardiovascular: Negative for chest pain and leg swelling. Gastrointestinal: Positive for abdominal distention, blood in stool and vomiting. Negative for abdominal pain, constipation and nausea. Endocrine: Negative. Genitourinary: Negative for dysuria, flank pain and hematuria. Musculoskeletal: Negative for arthralgias and back pain. Skin: Negative for rash and wound. Allergic/Immunologic: Negative. Neurological: Positive for dizziness, syncope and light-headedness. Negative for weakness and headaches. Hematological: Negative. Psychiatric/Behavioral: Negative for agitation and confusion. Physical Exam and Vital Signs   Vital Signs - Reviewed the patient's vital signs.     Patient Vitals for the past 12 hrs:   Temp Pulse Resp BP SpO2   08/05/21 0700 98.9 °F (37.2 °C) 60 17 115/69 100 %   08/05/21 0531 97.7 °F (36.5 °C) 74 16 109/67 98 %   08/05/21 0441 98.8 °F (37.1 °C) 68 18 111/65 100 %   08/05/21 0355 98.7 °F (37.1 °C) 71 16 (!) 91/52 100 %   08/05/21 0340 98.1 °F (36.7 °C) 71 18 107/64 100 %   08/05/21 0317 98.1 °F (36.7 °C) 70 19 (!) 100/50 100 %   08/05/21 0257 -- 73 19 123/79 100 %   08/05/21 0052 97.7 °F (36.5 °C) 73 16 110/62 99 %       Physical Exam:    GENERAL: awake, alert, cooperative, not in distress, pale  HEENT:  * Pupils equal, EOMI  * Head atraumatic  CV:  * regular rhythm  * +2 pulses in extremities bilaterally  PULMONARY: Good air movement, no wheezes or crackles  ABDOMEN: soft, not distended, not tender, no guarding. Melena obvious on exam.  : No suprapubic tenderness  EXTREMITIES/BACK: warm and perfused, no tenderness, no edema  SKIN: no rashes or signs of trauma  NEURO:  * Speech clear  * Moves U&LE to command      Medical Decision Making and ED Course   - I am the first and primary provider for this patient and am the primary provider of record. - I reviewed the vital signs, available nursing notes, past medical history, past surgical history, family history and social history. - Initial assessment performed. The patients presenting problems have been discussed, and the staff are in agreement with the care plan formulated and outlined with them. I have encouraged them to ask questions as they arise throughout their visit. - Records Reviewed: Nursing Notes, Old Medical Records and Ambulance Run Sheet      MDM:   Patient is a 55 y.o. male presenting for a gastrointestinal bleed. Vitals reveal no tachycardia or hypotension and physical exam reveals a pale patient who is stable. Based on the history, physical exam, risk factors, and vitals signs, I favor the following differential diagnoses: PUD, due to [facility] lesion, diverticulitis, aortoenteric fistula, liver failure, Bere-Ng tear, malignancy, anemia, dehydration.     The more comprehensive list of differential diagnoses, including the less probable ones, include but is not limited to esophageal varices, PUD, diverticulitis, diverticulosis, Bere-ng syndrome, GI malignancy, AVM, liver failure, anemia, coagulation disorders, aortoenteric fistula, mesenteric ischemia, gut wall necrosis, ischemic colitis, SBO, LBO. Results     Labs:     Recent Results (from the past 12 hour(s))   OCCULT BLOOD, STOOL    Collection Time: 08/05/21  1:00 AM   Result Value Ref Range    Occult Blood,day 1 Positive (A) Negative      Day 1 date: 8,052,021     CBC WITH AUTOMATED DIFF    Collection Time: 08/05/21  1:00 AM   Result Value Ref Range    WBC 12.1 (H) 4.1 - 11.1 K/uL    RBC 2.73 (L) 4.10 - 5.70 M/uL    HGB 7.4 (L) 12.1 - 17.0 g/dL    HCT 23.6 (L) 36.6 - 50.3 %    MCV 86.4 80.0 - 99.0 FL    MCH 27.1 26.0 - 34.0 PG    MCHC 31.4 30.0 - 36.5 g/dL    RDW 15.4 (H) 11.5 - 14.5 %    PLATELET 561 139 - 940 K/uL    MPV 11.7 8.9 - 12.9 FL    NRBC 0.0 0.0  WBC    ABSOLUTE NRBC 0.00 0.00 - 0.01 K/uL    NEUTROPHILS 87 (H) 32 - 75 %    LYMPHOCYTES 8 (L) 12 - 49 %    MONOCYTES 4 (L) 5 - 13 %    EOSINOPHILS 0 0 - 7 %    BASOPHILS 0 0 - 1 %    IMMATURE GRANULOCYTES 1 (H) 0 - 0.5 %    ABS. NEUTROPHILS 10.5 (H) 1.8 - 8.0 K/UL    ABS. LYMPHOCYTES 1.0 0.8 - 3.5 K/UL    ABS. MONOCYTES 0.5 0.0 - 1.0 K/UL    ABS. EOSINOPHILS 0.0 0.0 - 0.4 K/UL    ABS. BASOPHILS 0.1 0.0 - 0.1 K/UL    ABS. IMM. GRANS. 0.1 (H) 0.00 - 0.04 K/UL    DF AUTOMATED     METABOLIC PANEL, COMPREHENSIVE    Collection Time: 08/05/21  1:00 AM   Result Value Ref Range    Sodium 140 136 - 145 mmol/L    Potassium 3.7 3.5 - 5.1 mmol/L    Chloride 106 97 - 108 mmol/L    CO2 27 21 - 32 mmol/L    Anion gap 7 5 - 15 mmol/L    Glucose 235 (H) 65 - 100 mg/dL    BUN 56 (H) 6 - 20 mg/dL    Creatinine 1.07 0.70 - 1.30 mg/dL    BUN/Creatinine ratio 52 (H) 12 - 20      GFR est AA >60 >60 ml/min/1.73m2    GFR est non-AA >60 >60 ml/min/1.73m2    Calcium 7.6 (L) 8.5 - 10.1 mg/dL    Bilirubin, total 0.3 0.2 - 1.0 mg/dL    AST (SGOT) 18 15 - 37 U/L    ALT (SGPT) 29 12 - 78 U/L    Alk.  phosphatase 66 45 - 117 U/L    Protein, total 4.7 (L) 6.4 - 8.2 g/dL    Albumin 2.1 (L) 3.5 - 5.0 g/dL    Globulin 2.6 2.0 - 4.0 g/dL    A-G Ratio 0.8 (L) 1.1 - 2.2     TYPE & SCREEN    Collection Time: 08/05/21  1:00 AM   Result Value Ref Range    Crossmatch Expiration 08/08/2021,2359     ABO/Rh(D) A Positive     Antibody screen Negative     Unit number [id number]     Blood component type RC LR     Unit division 00     Status of unit [address] to transfuse     Crossmatch result Compatible    PROTHROMBIN TIME + INR    Collection Time: 08/05/21  1:00 AM   Result Value Ref Range    Prothrombin time 16.1 (H) 11.9 - 14.7 sec    INR 1.3 (H) 0.9 - 1.1     COVID-19 RAPID TEST    Collection Time: 08/05/21  3:01 AM   Result Value Ref Range    Specimen source Nasopharyngeal      COVID-19 rapid test Not Detected Not Detected     GLUCOSE, POC    Collection Time: 08/05/21  7:06 AM   Result Value Ref Range    Glucose (POC) 120 (H) 65 - 117 mg/dL    Performed by Everlean Fossa        Radiologic Studies:  CT Results  (Last 48 hours)               08/05/21 0257  CTA ABDOMEN PELV W CONT Final result    Impression:      No contrast extravasation. No inflammatory changes or bowel obstruction. Renal   lesions are too small to characterize. Follow-up as clinically indicated. Please   see full report. Narrative:  CTA abdomen and pelvis without and with intravenous contrast, 100 cc Isovue-370,   3-D MIP images       Dose reduction: All CT scans at this facility are performed using dose reduction   optimization techniques as appropriate to a performed exam including the   following: Automated exposure control, adjustments of the mA and/or kV according   to patient size, or use of iterative reconstruction technique. INDICATION: GI bleed       FINDINGS:       No aneurysm or dissection of abdominal aorta. Celiac trunk, SMA and renal   arteries are patent. No contrast extravasation. Mild atherosclerosis. No airspace disease in the lung bases.  Low density blood in the ventricles may represent anemia. Liver, spleen and pancreas are unremarkable. Gallbladder is   contracted without pericholecystic stranding. TIPS. No urinary stone or   hydronephrosis on either side. 18 mm left renal lesion posteriorly is too small   to characterize, may represent a cyst. Additional bilateral renal lesions are   too small to characterize. No bowel obstruction. Normal appendix. No evidence of diverticulitis. No   abscess. No free intraperitoneal air. Prostate and seminal vesicles appear   unremarkable based on CT criteria. No acute fracture in the visualized bony   structures. Small lucency body of L3 may represent a hemangioma. Tiny umbilical   defect containing fat. Probable small right inguinal hernia containing fat.                CXR Results  (Last 48 hours)    None          Medications ordered:  Medications   0.9% sodium chloride infusion 250 mL (has no administration in time range)   lactated Ringers infusion (150 mL/hr IntraVENous New Bag 8/5/21 0356)   pantoprazole (PROTONIX) tablet 40 mg (has no administration in time range)   zolpidem (AMBIEN) tablet 5 mg (has no administration in time range)   0.9% sodium chloride infusion (75 mL/hr IntraVENous New Bag 8/5/21 0747)   sodium chloride (NS) flush 5-40 mL (10 mL IntraVENous Given 8/5/21 0700)   sodium chloride (NS) flush 5-40 mL (has no administration in time range)   ondansetron (ZOFRAN) injection 4 mg (has no administration in time range)   pantoprazole (PROTONIX) 40 mg in 0.9% sodium chloride 10 mL injection (has no administration in time range)   insulin lispro (HUMALOG) injection (0 Units SubCUTAneous Held 8/5/21 0730)   glucose chewable tablet 16 g (has no administration in time range)   dextrose (D50W) injection syrg 12.5-25 g (has no administration in time range)   glucagon (GLUCAGEN) injection 1 mg (has no administration in time range)   sodium chloride 0.9 % bolus infusion 1,000 mL (0 mL IntraVENous IV Completed 8/5/21 0700)   iopamidoL (ISOVUE-370) 76 % injection 100 mL (100 mL IntraVENous Given 8/5/21 0200)   pantoprazole (PROTONIX) 80 mg in 0.9% sodium chloride 20 mL injection (80 mg IntraVENous Given 8/5/21 0353)        ED Course     ED Course:     ED Course as of Aug 05 0803   Thu Aug 05, 2021   0230 Globin is 7.4 from baseline of 15. Significant drop. Patient will need to be transfused. Consent obtained. HGB(!): 7.4 [SS]   0231 No LAN   Creatinine: 1.07 [SS]   0231 No evidence of hepatitis or liver failure. Normal bilirubin. AST: 18 [SS]   0231 Mildly elevated INR, no signs of liver failure. Patient not on anticoagulation.   INR(!): 1.3 [SS]   0231 Patient has obvious melena on exam.   Occult Blood,day 1(!): Positive [SS]      ED Course User Index  [SS] Doris Gama MD       Reassessment / Disposition Discussion:    Patient stable. Received 1 unit of blood with improvement of his symptoms. No further episodes of melena in the ED. CT was done and not show any active extravasation. Patient will need to be admitted for serial hemoglobins due to the significant hemoglobin drop. Will admit to medicine. Disposition     Disposition: Condition stable  Admitted to Floor Medical Floor the case was discussed with the admitting physician Dr. Paul Johnson. ADMISSION:  After completion of ED workup and discussion of results and diagnoses with the patient, patient was admitted to the hospital. All the patient's questions were answered. Case was discussed with the receiving team.      Consultations and Procedures:    Performed by: Bruno Box MD  Procedures     EKG interpretation (Preliminary):  Performed at 3.14, and read at 3.16. Rhythm: normal sinus rhythm; and regular . Rate (approx.): 68.  Axis: normal;  NE interval: normal;  QRS interval: normal ;  ST/T wave: T wave inversion in lead I;  Other findings: abnormal ekg. Diagnosis     Clinical Impression:   1. Anemia, unspecified type    2.  Gastrointestinal hemorrhage, unspecified gastrointestinal hemorrhage type    3. Syncope and collapse    4. Hypoglycemia        Attestations:    Renan Burt MD    Please note that this dictation was completed with [email or web address], the computer voice recognition software. Quite often unanticipated grammatical, syntax, homophones, and other interpretive errors are inadvertently transcribed by the computer software. Please disregard these errors. Please excuse any errors that have escaped final proofreading. Thank you.

## 2021-08-05 NOTE — ROUTINE PROCESS
TRANSFER - OUT REPORT:    Verbal report given to Hoa(name) on Pk Lozano  being transferred to [facility](unit) for routine progression of care       Report consisted of patients Situation, Background, Assessment and   Recommendations(SBAR). Information from the following report(s) SBAR, ED Summary, [facility] and Recent Results was reviewed with the receiving nurse. Lines:   Peripheral IV 08/05/21 Posterior;Right Hand (Active)   Site Assessment Clean, dry, & intact 08/05/21 0106   Phlebitis Assessment 0 08/05/21 0106   Infiltration Assessment 0 08/05/21 0106   Dressing Status Clean, dry, & intact 08/05/21 0106   Dressing Type Tape;Transparent 08/05/21 0106   Hub Color/Line Status Pink;Flushed;Patent 08/05/21 0106   Action Taken Blood drawn 08/05/21 0106       Peripheral IV 08/05/21 Right Antecubital (Active)   Site Assessment Clean, dry, & intact 08/05/21 0107   Phlebitis Assessment 0 08/05/21 0107   Infiltration Assessment 0 08/05/21 0107   Dressing Status Clean, dry, & intact 08/05/21 0107   Dressing Type Tape;Transparent 08/05/21 0107   Hub Color/Line Status Pink;Flushed;Patent 08/05/21 0107        Opportunity for questions and clarification was provided.       Patient transported with:   Monitor  Tech

## 2021-08-05 NOTE — PROGRESS NOTES
8/5/21. PCP is Dr. Alf Kang. D/C Plan is home with (SP) Marko Sewell @ [phone] & she will transport pt home upon discharge. Uses no DME/declined home health.

## 2021-08-05 NOTE — CONSULTS
Consult    Patient: Kizzy Smith MRN: [id number]  SSN: [id number]    YOB: 1965  Age: 55 y.o. Sex: male      Subjective:      Kizzy Smith is a 55 y.o. male who is being seen for melena,anemia      He presents to the emergency room complaining of multiple episodes of black-colored stools and dark clot blood in the stool, feeling very weak, tired, dizzy lightheaded and actually had an episode of syncope while he was using the restroom. He found with severe hypotension when EMS arrived.   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     Past Surgical History:   Procedure Laterality Date    BIOPSY LIVER      CELL COUNT, CSF      x 2    COLONOSCOPY      Upper and Lower     FOOT/TOES SURGERY PROC UNLISTED      Remove Nerve from Left foot     HX GI      liver stent    HX ORTHOPAEDIC      MRI BRAIN W WO CONT      STENT INSERTION      Liver to Kidneys       Family History   Family history unknown: Yes     Social History     Tobacco Use    Smoking status: Never Smoker    Smokeless tobacco: Never Used   Substance Use Topics    Alcohol use: Yes      Current Facility-Administered Medications   Medication Dose Route Frequency Provider Last Rate Last Admin    0.9% sodium chloride infusion 250 mL  250 mL IntraVENous PRN Benjamín Tena MD        lactated Ringers infusion  100 mL/hr IntraVENous CONTINUOUS Rexine MD Shreya 150 mL/hr at 08/05/21 0356 150 mL/hr at 08/05/21 0356    pantoprazole (PROTONIX) tablet 40 mg  40 mg Oral ACB Shashi Cash MD        zolpidem (AMBIEN) tablet 5 mg  5 mg Oral QHS PRN Margaret Staley MD        sodium chloride (NS) flush 5-40 mL  5-40 mL IntraVENous Q8H Margaret Staley MD   10 mL at 08/05/21 0700    sodium chloride (NS) flush 5-40 mL  5-40 mL IntraVENous PRN Margaret Staley MD        [facility]ron [facility] injection 4 mg  4 mg IntraVENous Q4H PRN Margaret Staley MD        pantoprazole (PROTONIX) 40 mg in 0.9% sodium chloride 10 mL injection  40 mg IntraVENous BID Margaret Staley MD        insulin lispro (HUMALOG) injection   SubCUTAneous AC&HS Margaret Staley MD        glucose chewable tablet 16 g  4 Tablet Oral PRN Margaret Staley MD        dextrose (D50W) injection syrg 12.5-25 g  25-50 mL IntraVENous PRN Margaret Staley MD        glucagon (GLUCAGEN) injection 1 mg  1 mg IntraMUSCular PRN Margaret Staley MD        0.9% sodium chloride infusion 250 mL  250 mL IntraVENous PRN Margaret Staley MD        0.9% sodium chloride infusion 250 mL  250 mL IntraVENous PRN Gustabo Julian MD            Allergies   Allergen Reactions    Heparin (Bovine) Other (comments)     Questionable per patient ,back lesions. Review of Systems:  Review of Systems   Unable to perform ROS: Medical condition   Constitutional: Negative for weight loss. HENT: Positive for nosebleeds. Eyes: Negative. Respiratory: Negative. Negative for sputum production. Cardiovascular: Negative. Negative for claudication. Gastrointestinal: Positive for melena and nausea. Negative for vomiting. Genitourinary: Negative. Negative for hematuria. Skin: Negative. Neurological: Positive for weakness. Endo/Heme/Allergies: Bruises/bleeds easily. Psychiatric/Behavioral: Positive for depression.         Objective:     Vitals:    08/05/21 0441 08/05/21 0531 08/05/21 0700 08/05/21 0900   BP: 111/65 109/67 115/69 (!) 91/54   Pulse: 68 74 60 61   Resp: 18 16 17 16   Temp: 98.8 °F (37.1 °C) 97.7 °F (36.5 °C) 98.9 °F (37.2 °C) 99.2 °F (37.3 °C)   SpO2: 100% 98% 100% 100%   Weight:       Height:            Physical Exam:  Physical Exam  Constitutional:       Appearance: He is ill-appearing. HENT:      Mouth/Throat:      Mouth: Mucous membranes are dry. Eyes:      General: No scleral icterus. Cardiovascular:      Rate and Rhythm: Normal rate and regular rhythm. Pulses: Normal pulses. Heart sounds: Normal heart sounds. Pulmonary:      Effort: Pulmonary effort is normal.      Breath sounds: Normal breath sounds. No rhonchi. Abdominal:      General: Abdomen is flat. Bowel sounds are normal. There is no distension. Palpations: Abdomen is soft. There is no mass. Tenderness: There is no abdominal tenderness. There is no guarding or rebound. Musculoskeletal:         General: No deformity. Normal range of motion. Cervical back: Neck supple. Skin:     Capillary Refill: Capillary refill takes less than 2 seconds. Neurological:      General: No focal deficit present. Psychiatric:         Mood and Affect: Mood normal.          Recent Results (from the past 24 hour(s))   OCCULT BLOOD, STOOL    Collection Time: 08/05/21  1:00 AM   Result Value Ref Range    Occult Blood,day 1 Positive (A) Negative      Day 1 date: 8,052,021     CBC WITH AUTOMATED DIFF    Collection Time: 08/05/21  1:00 AM   Result Value Ref Range    WBC 12.1 (H) 4.1 - 11.1 K/uL    RBC 2.73 (L) 4.10 - 5.70 M/uL    HGB 7.4 (L) 12.1 - 17.0 g/dL    HCT 23.6 (L) 36.6 - 50.3 %    MCV 86.4 80.0 - 99.0 FL    MCH 27.1 26.0 - 34.0 PG    MCHC 31.4 30.0 - 36.5 g/dL    RDW 15.4 (H) 11.5 - 14.5 %    PLATELET 898 616 - 468 K/uL    MPV 11.7 8.9 - 12.9 FL    NRBC 0.0 0.0  WBC    ABSOLUTE NRBC 0.00 0.00 - 0.01 K/uL    NEUTROPHILS 87 (H) 32 - 75 %    LYMPHOCYTES 8 (L) 12 - 49 %    MONOCYTES 4 (L) 5 - 13 %    EOSINOPHILS 0 0 - 7 %    BASOPHILS 0 0 - 1 %    IMMATURE GRANULOCYTES 1 (H) 0 - 0.5 %    ABS. NEUTROPHILS 10.5 (H) 1.8 - 8.0 K/UL    ABS.  LYMPHOCYTES 1.0 0.8 - 3.5 K/UL    ABS. MONOCYTES 0.5 0.0 - 1.0 K/UL    ABS. EOSINOPHILS 0.0 0.0 - 0.4 K/UL    ABS. BASOPHILS 0.1 0.0 - 0.1 K/UL    ABS. IMM. GRANS. 0.1 (H) 0.00 - 0.04 K/UL    DF AUTOMATED     METABOLIC PANEL, COMPREHENSIVE    Collection Time: 08/05/21  1:00 AM   Result Value Ref Range    Sodium 140 136 - 145 mmol/L    Potassium 3.7 3.5 - 5.1 mmol/L    Chloride 106 97 - 108 mmol/L    CO2 27 21 - 32 mmol/L    Anion gap 7 5 - 15 mmol/L    Glucose 235 (H) 65 - 100 mg/dL    BUN 56 (H) 6 - 20 mg/dL    Creatinine 1.07 0.70 - 1.30 mg/dL    BUN/Creatinine ratio 52 (H) 12 - 20      GFR est AA >60 >60 ml/min/1.73m2    GFR est non-AA >60 >60 ml/min/1.73m2    Calcium 7.6 (L) 8.5 - 10.1 mg/dL    Bilirubin, total 0.3 0.2 - 1.0 mg/dL    AST (SGOT) 18 15 - 37 U/L    ALT (SGPT) 29 12 - 78 U/L    Alk.  phosphatase 66 45 - 117 U/L    Protein, total 4.7 (L) 6.4 - 8.2 g/dL    Albumin 2.1 (L) 3.5 - 5.0 g/dL    Globulin 2.6 2.0 - 4.0 g/dL    A-G Ratio 0.8 (L) 1.1 - 2.2     TYPE & SCREEN    Collection Time: 08/05/21  1:00 AM   Result Value Ref Range    Crossmatch Expiration 08/08/2021,2359     ABO/Rh(D) A Positive     Antibody screen Negative     Unit number [id number]     Blood component type  LR     Unit division 00     Status of unit Issued     [address] to transfuse     Crossmatch result Compatible     Unit number [id number]     Blood component type [facility]     Unit division 00     Status of unit [address] to transfuse     Crossmatch result Compatible    PROTHROMBIN TIME + INR    Collection Time: 08/05/21  1:00 AM   Result Value Ref Range    Prothrombin time 16.1 (H) 11.9 - 14.7 sec    INR 1.3 (H) 0.9 - 1.1     COVID-19 RAPID TEST    Collection Time: 08/05/21  3:01 AM   Result Value Ref Range    Specimen source Nasopharyngeal      COVID-19 rapid test Not Detected Not Detected     EKG, 12 LEAD, INITIAL    Collection Time: 08/05/21  3:14 AM   Result Value Ref Range    Ventricular Rate 68 BPM Atrial Rate 68 BPM    P-R Interval 148 ms    QRS Duration 114 ms    Q-T Interval 462 ms    QTC Calculation (Bezet) 491 ms    Calculated P Axis 28 degrees    Calculated R Axis 56 degrees    Calculated T Axis 105 degrees    Diagnosis       Normal sinus rhythm  T wave abnormality, consider lateral ischemia  Abnormal ECG  No previous ECGs available  Confirmed by Zach Julian ([id number]) on 8/5/2021 9:35:02 AM     GLUCOSE, POC    Collection Time: 08/05/21  7:06 AM   Result Value Ref Range    Glucose (POC) 120 (H) 65 - 117 mg/dL    Performed by Charanjit Guzman, POC    Collection Time: 08/05/21  9:04 AM   Result Value Ref Range    Glucose (POC) 135 (H) 65 - 117 mg/dL    Performed by Ivan Heredia         CTA ABDOMEN PELV W CONT   Final Result      No contrast extravasation. No inflammatory changes or bowel obstruction. Renal   lesions are too small to characterize. Follow-up as clinically indicated. Please   see full report. Assessment:     Hospital Problems  Date Reviewed: 8/5/2021        Codes Class Noted POA    GI hemorrhage ICD-10-CM: K92.2  ICD-9-CM: 578.9  8/5/2021 Yes          GI bleeding, like from upper GI source, NSAIDs,  Anemia, postop GI bleeding,  History of p.o. EMS,   the patient may have TB  infection by infectious disease  Plan:   Continue n.p.o.   Fall precautions, high risk  Continue monitor hemoglobin closely,  PPI delivery by iv       Need an upper endoscopy for evaluation gi bleeding, anemia  indication, risks, options discussed with patient family member, patient's wife  Signed By: April Cabrera MD     August 5, 2021         Thank you for allowing me to participate in this patients care  Cc Referring Physician   Linda Benitez MD

## 2021-08-05 NOTE — ACP (ADVANCE CARE PLANNING)
Advance Care Planning   Healthcare Decision Maker:       Primary Decision Maker: Kimberly Rosario - [facility] - [phone]

## 2021-08-06 LAB
ERYTHROCYTE [DISTWIDTH] IN BLOOD BY AUTOMATED COUNT: 15.4 % (ref 11.5–14.5)
ERYTHROCYTE [DISTWIDTH] IN BLOOD BY AUTOMATED COUNT: 15.5 % (ref 11.5–14.5)
GLUCOSE BLD STRIP.AUTO-MCNC: 121 MG/DL (ref 65–117)
GLUCOSE BLD STRIP.AUTO-MCNC: 218 MG/DL (ref 65–117)
HCT VFR BLD AUTO: 21.7 % (ref 36.6–50.3)
HCT VFR BLD AUTO: 24 % (ref 36.6–50.3)
HGB BLD-MCNC: 7.1 G/DL (ref 12.1–17)
HGB BLD-MCNC: 8 G/DL (ref 12.1–17)
MCH RBC QN AUTO: 27.5 PG (ref 26–34)
MCH RBC QN AUTO: 27.7 PG (ref 26–34)
MCHC RBC AUTO-ENTMCNC: 32.7 G/DL (ref 30–36.5)
MCHC RBC AUTO-ENTMCNC: 33.3 G/DL (ref 30–36.5)
MCV RBC AUTO: 83 FL (ref 80–99)
MCV RBC AUTO: 84.1 FL (ref 80–99)
NRBC # BLD: 0 K/UL (ref 0–0.01)
NRBC # BLD: 0 K/UL (ref 0–0.01)
NRBC BLD-RTO: 0 PER 100 WBC
NRBC BLD-RTO: 0 PER 100 WBC
PERFORMED BY, TECHID: ABNORMAL
PERFORMED BY, TECHID: ABNORMAL
PLATELET # BLD AUTO: 137 K/UL (ref 150–400)
PLATELET # BLD AUTO: 149 K/UL (ref 150–400)
PMV BLD AUTO: 11.4 FL (ref 8.9–12.9)
PMV BLD AUTO: 11.5 FL (ref 8.9–12.9)
RBC # BLD AUTO: 2.58 M/UL (ref 4.1–5.7)
RBC # BLD AUTO: 2.89 M/UL (ref 4.1–5.7)
WBC # BLD AUTO: 7.2 K/UL (ref 4.1–11.1)
WBC # BLD AUTO: 7.6 K/UL (ref 4.1–11.1)

## 2021-08-06 PROCEDURE — 36415 COLL VENOUS BLD VENIPUNCTURE: CPT

## 2021-08-06 PROCEDURE — 36430 TRANSFUSION BLD/BLD COMPNT: CPT

## 2021-08-06 PROCEDURE — 82962 GLUCOSE BLOOD TEST: CPT

## 2021-08-06 PROCEDURE — 65270000029 HC RM PRIVATE

## 2021-08-06 PROCEDURE — 74011250637 HC RX REV CODE- 250/637: Performed by: INTERNAL MEDICINE

## 2021-08-06 PROCEDURE — 85027 COMPLETE CBC AUTOMATED: CPT

## 2021-08-06 PROCEDURE — P9016 RBC LEUKOCYTES REDUCED: HCPCS

## 2021-08-06 PROCEDURE — 74011250637 HC RX REV CODE- 250/637: Performed by: PHYSICIAN ASSISTANT

## 2021-08-06 RX ORDER — SODIUM CHLORIDE 9 MG/ML
250 INJECTION, SOLUTION INTRAVENOUS AS NEEDED
Status: DISCONTINUED | OUTPATIENT
Start: 2021-08-06 | End: 2021-08-07 | Stop reason: HOSPADM

## 2021-08-06 RX ORDER — PANTOPRAZOLE SODIUM 40 MG/1
40 TABLET, DELAYED RELEASE ORAL 2 TIMES DAILY
Status: DISCONTINUED | OUTPATIENT
Start: 2021-08-06 | End: 2021-08-07 | Stop reason: HOSPADM

## 2021-08-06 RX ORDER — SUCRALFATE 1 G/1
1 TABLET ORAL
Status: DISCONTINUED | OUTPATIENT
Start: 2021-08-06 | End: 2021-08-07 | Stop reason: HOSPADM

## 2021-08-06 RX ORDER — ACETAMINOPHEN 325 MG/1
650 TABLET ORAL
Status: DISCONTINUED | OUTPATIENT
Start: 2021-08-06 | End: 2021-08-07 | Stop reason: HOSPADM

## 2021-08-06 RX ADMIN — PANTOPRAZOLE SODIUM 40 MG: 40 TABLET, DELAYED RELEASE ORAL at 20:56

## 2021-08-06 RX ADMIN — Medication 10 ML: at 20:58

## 2021-08-06 RX ADMIN — SUCRALFATE 1 G: 1 TABLET ORAL at 16:11

## 2021-08-06 RX ADMIN — ACETAMINOPHEN 650 MG: 325 TABLET ORAL at 13:07

## 2021-08-06 RX ADMIN — SUCRALFATE 1 G: 1 TABLET ORAL at 11:33

## 2021-08-06 RX ADMIN — PANTOPRAZOLE SODIUM 40 MG: 40 TABLET, DELAYED RELEASE ORAL at 09:45

## 2021-08-06 RX ADMIN — SUCRALFATE 1 G: 1 TABLET ORAL at 20:56

## 2021-08-06 NOTE — PROGRESS NOTES
Hospitalist Progress Note    Subjective:   Daily Progress Note: 8/6/2021 4:36 PM    Dizziness, denies GI bleed or chest pain    Current Facility-Administered Medications   Medication Dose Route Frequency    0.9% sodium chloride infusion 250 mL  250 mL IntraVENous PRN    sucralfate (CARAFATE) tablet 1 g  1 g Oral AC&HS    pantoprazole (PROTONIX) tablet 40 mg  40 mg Oral BID    acetaminophen (TYLENOL) tablet 650 mg  650 mg Oral Q6H PRN    0.9% sodium chloride infusion 250 mL  250 mL IntraVENous PRN    lactated Ringers infusion  100 mL/hr IntraVENous CONTINUOUS    zolpidem (AMBIEN) tablet 5 mg  5 mg Oral QHS PRN    sodium chloride (NS) flush 5-40 mL  5-40 mL IntraVENous Q8H    sodium chloride (NS) flush 5-40 mL  5-40 mL IntraVENous PRN    ondansetron (ZOFRAN) injection 4 mg  4 mg IntraVENous Q4H PRN    [Held by provider] insulin lispro (HUMALOG) injection   SubCUTAneous AC&HS    glucose chewable tablet 16 g  4 Tablet Oral PRN    dextrose (D50W) injection syrg 12.5-25 g  25-50 mL IntraVENous PRN    glucagon (GLUCAGEN) injection 1 mg  1 mg IntraMUSCular PRN    0.9% sodium chloride infusion 250 mL  250 mL IntraVENous PRN    0.9% sodium chloride infusion 250 mL  250 mL IntraVENous PRN        Review of Systems  Review of Systems   Constitutional: Positive for malaise/fatigue. Negative for fever. HENT: Negative. Eyes: Negative. Respiratory: Negative for cough, shortness of breath and wheezing. Cardiovascular: Negative for chest pain and leg swelling. Gastrointestinal: Positive for melena. Negative for abdominal pain, nausea and vomiting. Genitourinary: Negative. Musculoskeletal: Negative. Skin: Negative. Neurological: Negative. Psychiatric/Behavioral: Negative.              Objective:     Visit Vitals  /64   Pulse 72   Temp 98.2 °F (36.8 °C)   Resp 20   Ht 5' 11\" (1.803 m)   Wt 122.5 kg (270 lb)   SpO2 98%   BMI 37.66 kg/m²      O2 Device: None (Room air)    Temp (24hrs), Av °F (36.7 °C), Min:97.3 °F (36.3 °C), Max:98.3 °F (36.8 °C)      No intake/output data recorded.  1901 -  0700  In: 2598.3 [P.O.:400; I.V.:1578.3]  Out: 970 [Urine:970]    Recent Results (from the past 24 hour(s))   GLUCOSE, POC    Collection Time: 21  5:08 PM   Result Value Ref Range    Glucose (POC) 127 (H) 65 - 117 mg/dL    Performed by Eulis Fat    CBC W/O DIFF    Collection Time: 21  5:29 AM   Result Value Ref Range    WBC 7.2 4.1 - 11.1 K/uL    RBC 2.58 (L) 4.10 - 5.70 M/uL    HGB 7.1 (L) 12.1 - 17.0 g/dL    HCT 21.7 (L) 36.6 - 50.3 %    MCV 84.1 80.0 - 99.0 FL    MCH 27.5 26.0 - 34.0 PG    MCHC 32.7 30.0 - 36.5 g/dL    RDW 15.4 (H) 11.5 - 14.5 %    PLATELET 312 (L) 422 - 400 K/uL    MPV 11.5 8.9 - 12.9 FL    NRBC 0.0 0.0  WBC    ABSOLUTE NRBC 0.00 0.00 - 0.01 K/uL   GLUCOSE, POC    Collection Time: 21  9:12 AM   Result Value Ref Range    Glucose (POC) 218 (H) 65 - 117 mg/dL    Performed by Eulis Fat    GLUCOSE, POC    Collection Time: 21 11:32 AM   Result Value Ref Range    Glucose (POC) 121 (H) 65 - 117 mg/dL    Performed by Eulis Fat    CBC W/O DIFF    Collection Time: 21  3:20 PM   Result Value Ref Range    WBC 7.6 4.1 - 11.1 K/uL    RBC 2.89 (L) 4.10 - 5.70 M/uL    HGB 8.0 (L) 12.1 - 17.0 g/dL    HCT 24.0 (L) 36.6 - 50.3 %    MCV 83.0 80.0 - 99.0 FL    MCH 27.7 26.0 - 34.0 PG    MCHC 33.3 30.0 - 36.5 g/dL    RDW 15.5 (H) 11.5 - 14.5 %    PLATELET 034 (L) 501 - 400 K/uL    MPV 11.4 8.9 - 12.9 FL    NRBC 0.0 0.0  WBC    ABSOLUTE NRBC 0.00 0.00 - 0.01 K/uL        CTA ABDOMEN PELV W CONT   Final Result      No contrast extravasation. No inflammatory changes or bowel obstruction. Renal   lesions are too small to characterize. Follow-up as clinically indicated. Please   see full report. PHYSICAL EXAM:    Physical Exam  Vitals reviewed. Constitutional:       General: He is not in acute distress.      Appearance: He is not ill-appearing. HENT:      Head: Normocephalic and atraumatic. Mouth/Throat:      Mouth: Mucous membranes are dry. Pharynx: Oropharynx is clear. Eyes:      Conjunctiva/sclera: Conjunctivae normal.   Cardiovascular:      Rate and Rhythm: Normal rate and regular rhythm. Heart sounds: Normal heart sounds. Pulmonary:      Effort: Pulmonary effort is normal.      Breath sounds: Normal breath sounds. Abdominal:      General: Abdomen is flat. Bowel sounds are normal.      Palpations: Abdomen is soft. Musculoskeletal:         General: Normal range of motion. Cervical back: Normal range of motion and neck supple. Skin:     General: Skin is warm and dry. Coloration: Skin is pale. Neurological:      General: No focal deficit present. Mental Status: He is alert and oriented to person, place, and time. Mental status is at baseline.    Psychiatric:         Mood and Affect: Mood normal.          Data Review    Recent Results (from the past 24 hour(s))   GLUCOSE, POC    Collection Time: 08/05/21  5:08 PM   Result Value Ref Range    Glucose (POC) 127 (H) 65 - 117 mg/dL    Performed by [employer]    CBC W/O DIFF    Collection Time: 08/06/21  5:29 AM   Result Value Ref Range    WBC 7.2 4.1 - 11.1 K/uL    RBC 2.58 (L) 4.10 - 5.70 M/uL    HGB 7.1 (L) 12.1 - 17.0 g/dL    HCT 21.7 (L) 36.6 - 50.3 %    MCV 84.1 80.0 - 99.0 FL    MCH 27.5 26.0 - 34.0 PG    MCHC 32.7 30.0 - 36.5 g/dL    RDW 15.4 (H) 11.5 - 14.5 %    PLATELET 372 (L) 890 - 400 K/uL    MPV 11.5 8.9 - 12.9 FL    NRBC 0.0 0.0  WBC    ABSOLUTE NRBC 0.00 0.00 - 0.01 K/uL   GLUCOSE, POC    Collection Time: 08/06/21  9:12 AM   Result Value Ref Range    Glucose (POC) 218 (H) 65 - 117 mg/dL    Performed by [employer], POC    Collection Time: 08/06/21 11:32 AM   Result Value Ref Range    Glucose (POC) 121 (H) 65 - 117 mg/dL    Performed by Alena Dailey    CBC W/O DIFF    Collection Time: 08/06/21  3:20 PM   Result Value Ref Range    WBC 7.6 4.1 - 11.1 K/uL    RBC 2.89 (L) 4.10 - 5.70 M/uL    HGB 8.0 (L) 12.1 - 17.0 g/dL    HCT 24.0 (L) 36.6 - 50.3 %    MCV 83.0 80.0 - 99.0 FL    MCH 27.7 26.0 - 34.0 PG    MCHC 33.3 30.0 - 36.5 g/dL    RDW 15.5 (H) 11.5 - 14.5 %    PLATELET 197 (L) 324 - 400 K/uL    MPV 11.4 8.9 - 12.9 FL    NRBC 0.0 0.0  WBC    ABSOLUTE NRBC 0.00 0.00 - 0.01 K/uL        Assessment/Plan:     Active Problems:    GI hemorrhage (8/5/2021)      Hospital course:    Emerick Barthel a 55 y. o. male who presents with PMH significant for POEMS Syndrome to the ED for chief complaint of melena.  Patient reports 2 episodes of \"dark, bloody\" bowel movements -- once in the afternoon, and once in the evening.  Patient has also been experiencing dizziness that began 1 day prior to admission, which is primarily triggered upon standing and \"looking down. \" Patient developed a syncopal episode with associated dizziness that was witnessed by the patient's wife. Alecia Gallo additionally reports episode of hematemesis that occurred 2 days ago. Alecia Gallo mentions eating \"shrimp\" 3 days ago and has since then had poor appetite, attributed to food poisoning.  Patient additionally complains of SOB. EGD was performed revealing coffee-ground emesis in the stomach and gastritis as well as esophagitis. Started on IV Protonix, Carafate. Patient has received 2 units packed red blood cells due to acute blood loss anemia. Impression:    1. Upper GI bleed  2. POEM syndrome  3. Acute blood loss anemia        Plan:    1. Upper GI bleed  Hematemesis and melena reported  EGD revealed coffee-ground emesis with gastritis and esophagitis  Continue Carafate  Continue Protonix twice daily  Transfuse for hemoglobin less than 7 or symptoms. 2. POEM syndrome  Stable    3.   Acute blood loss anemia  Transfuse for hemoglobin less than 7    CODE STATUS: Full code    DVT prophylaxis: SCDs  Ulcer prophylaxis: Tonics    Discharge barrier: Hemoglobin greater than 8 and no dizziness with standing    Discussed care with patient and wife at [facility] discussed with: Patient/Family    Total time spent with patient: >35 minutes.

## 2021-08-06 NOTE — PROGRESS NOTES
Spiritual Care Assessment/Progress Note  [facility]      NAME: Anali Saldivar      MRN: [id number]  AGE: 55 y.o.  SEX: male  Restoration Affiliation: Evangelical   Language: English     8/6/2021     Total Time (in minutes): 20     Spiritual Assessment begun in [facility] SURG[facility] through conversation with:         [x]Patient        [x] Family    [] Friend(s)        Reason for Consult: Advance medical directive consult, Initial/Spiritual assessment, patient floor     Spiritual beliefs: (Please include comment if needed)     [] Identifies with a umair tradition:         [] Supported by a umair community:            [] Claims no spiritual orientation:           [] Seeking spiritual identity:                [] Adheres to an individual form of spirituality:           [x] Not able to assess:                           Identified resources for coping:      [] Prayer                               [] Music                  [] Guided Imagery     [x] Family/friends                 [] Pet visits     [] Devotional reading                         [] Unknown     [] Other:                                             Interventions offered during this visit: (See comments for more details)          Family/Friend(s): Advance medical directive consult, Affirmation of emotions/emotional suffering, Catharsis/review of pertinent events in supportive environment, Coping skills reviewed/reinforced, End of life issues discussed, Initial Assessment     Plan of Care:     [] Support spiritual and/or cultural needs    [] Support AMD and/or advance care planning process      [] Support grieving process   [] Coordinate Rites and/or Rituals    [] Coordination with community clergy   [] No spiritual needs identified at this time   [] Detailed Plan of Care below (See Comments)  [] Make referral to Music Therapy  [] Make referral to Pet Therapy     [] Make referral to Addiction services  [] Make referral to [facility]  [] Make referral to Spiritual Care Partner  [] No future visits requested        [x] Follow up upon further referrals     Comments: The purpose of the visit was to do a spiritual assessment on the patient and AMD. The patient appeared to be resting peacefully and comfortably. As well his wife visiting and she shared that he was about to be discharged she felt they no longer needed the document. She mentioned that she was hopeful about her husbands condition. The  provided the ministry of presence and the comfort of spiritual care. [address] [facility] Arabella Oshea.    can be reached by calling the  at [facility]  [phone]

## 2021-08-07 VITALS
HEART RATE: 86 BPM | DIASTOLIC BLOOD PRESSURE: 65 MMHG | RESPIRATION RATE: 18 BRPM | HEIGHT: 71 IN | BODY MASS INDEX: 37.8 KG/M2 | TEMPERATURE: 97.3 F | OXYGEN SATURATION: 98 % | SYSTOLIC BLOOD PRESSURE: 117 MMHG | WEIGHT: 270 LBS

## 2021-08-07 PROBLEM — D62 ACUTE BLOOD LOSS ANEMIA: Status: ACTIVE | Noted: 2021-08-07

## 2021-08-07 PROBLEM — R55 SYNCOPE: Status: ACTIVE | Noted: 2021-08-07

## 2021-08-07 LAB
ALBUMIN SERPL-MCNC: 2 G/DL (ref 3.5–5)
ALBUMIN/GLOB SERPL: 0.8 {RATIO} (ref 1.1–2.2)
ALP SERPL-CCNC: 65 U/L (ref 45–117)
ALT SERPL-CCNC: 22 U/L (ref 12–78)
ANION GAP SERPL CALC-SCNC: 4 MMOL/L (ref 5–15)
AST SERPL W P-5'-P-CCNC: 15 U/L (ref 15–37)
BASOPHILS # BLD: 0 K/UL (ref 0–0.1)
BASOPHILS NFR BLD: 1 % (ref 0–1)
BILIRUB SERPL-MCNC: 0.5 MG/DL (ref 0.2–1)
BUN SERPL-MCNC: 20 MG/DL (ref 6–20)
BUN/CREAT SERPL: 32 (ref 12–20)
CA-I BLD-MCNC: 7.7 MG/DL (ref 8.5–10.1)
CHLORIDE SERPL-SCNC: 108 MMOL/L (ref 97–108)
CO2 SERPL-SCNC: 31 MMOL/L (ref 21–32)
CORTIS SERPL-MCNC: 12.4 UG/DL
CREAT SERPL-MCNC: 0.63 MG/DL (ref 0.7–1.3)
DIFFERENTIAL METHOD BLD: ABNORMAL
EOSINOPHIL # BLD: 0.3 K/UL (ref 0–0.4)
EOSINOPHIL NFR BLD: 4 % (ref 0–7)
ERYTHROCYTE [DISTWIDTH] IN BLOOD BY AUTOMATED COUNT: 15.3 % (ref 11.5–14.5)
EST. AVERAGE GLUCOSE BLD GHB EST-MCNC: 114 MG/DL
GLOBULIN SER CALC-MCNC: 2.5 G/DL (ref 2–4)
GLUCOSE BLD STRIP.AUTO-MCNC: 141 MG/DL (ref 65–117)
GLUCOSE BLD STRIP.AUTO-MCNC: 155 MG/DL (ref 65–117)
GLUCOSE SERPL-MCNC: 129 MG/DL (ref 65–100)
HBA1C MFR BLD: 5.6 % (ref 4–5.6)
HCT VFR BLD AUTO: 23.8 % (ref 36.6–50.3)
HGB BLD-MCNC: 7.9 G/DL (ref 12.1–17)
IMM GRANULOCYTES # BLD AUTO: 0 K/UL (ref 0–0.04)
IMM GRANULOCYTES NFR BLD AUTO: 1 % (ref 0–0.5)
LYMPHOCYTES # BLD: 1.6 K/UL (ref 0.8–3.5)
LYMPHOCYTES NFR BLD: 25 % (ref 12–49)
MCH RBC QN AUTO: 27.5 PG (ref 26–34)
MCHC RBC AUTO-ENTMCNC: 33.2 G/DL (ref 30–36.5)
MCV RBC AUTO: 82.9 FL (ref 80–99)
MONOCYTES # BLD: 0.8 K/UL (ref 0–1)
MONOCYTES NFR BLD: 12 % (ref 5–13)
NEUTS SEG # BLD: 3.8 K/UL (ref 1.8–8)
NEUTS SEG NFR BLD: 57 % (ref 32–75)
NRBC # BLD: 0 K/UL (ref 0–0.01)
NRBC BLD-RTO: 0 PER 100 WBC
PERFORMED BY, TECHID: ABNORMAL
PERFORMED BY, TECHID: ABNORMAL
PLATELET # BLD AUTO: 141 K/UL (ref 150–400)
PMV BLD AUTO: 11.4 FL (ref 8.9–12.9)
POTASSIUM SERPL-SCNC: 3.3 MMOL/L (ref 3.5–5.1)
PROT SERPL-MCNC: 4.5 G/DL (ref 6.4–8.2)
RBC # BLD AUTO: 2.87 M/UL (ref 4.1–5.7)
SODIUM SERPL-SCNC: 143 MMOL/L (ref 136–145)
WBC # BLD AUTO: 6.6 K/UL (ref 4.1–11.1)

## 2021-08-07 PROCEDURE — 83036 HEMOGLOBIN GLYCOSYLATED A1C: CPT

## 2021-08-07 PROCEDURE — 85025 COMPLETE CBC W/AUTO DIFF WBC: CPT

## 2021-08-07 PROCEDURE — 82533 TOTAL CORTISOL: CPT

## 2021-08-07 PROCEDURE — 74011250637 HC RX REV CODE- 250/637: Performed by: PHYSICIAN ASSISTANT

## 2021-08-07 PROCEDURE — 82962 GLUCOSE BLOOD TEST: CPT

## 2021-08-07 PROCEDURE — 80053 COMPREHEN METABOLIC PANEL: CPT

## 2021-08-07 PROCEDURE — 36415 COLL VENOUS BLD VENIPUNCTURE: CPT

## 2021-08-07 RX ORDER — SUCRALFATE 1 G/1
1 TABLET ORAL
Qty: 30 TABLET | Refills: 1 | Status: SHIPPED | OUTPATIENT
Start: 2021-08-07 | End: 2021-08-16 | Stop reason: SDUPTHER

## 2021-08-07 RX ORDER — LACTULOSE 10 G/10G
10 SOLUTION ORAL 3 TIMES DAILY
Qty: 10 PACKET | Refills: 0 | Status: SHIPPED | OUTPATIENT
Start: 2021-08-07 | End: 2021-09-20 | Stop reason: ALTCHOICE

## 2021-08-07 RX ORDER — PANTOPRAZOLE SODIUM 40 MG/1
40 TABLET, DELAYED RELEASE ORAL 2 TIMES DAILY
Qty: 30 TABLET | Refills: 1 | Status: SHIPPED | OUTPATIENT
Start: 2021-08-07 | End: 2021-09-20 | Stop reason: SDUPTHER

## 2021-08-07 RX ADMIN — Medication 10 ML: at 10:03

## 2021-08-07 RX ADMIN — Medication 10 ML: at 13:05

## 2021-08-07 RX ADMIN — PANTOPRAZOLE SODIUM 40 MG: 40 TABLET, DELAYED RELEASE ORAL at 10:02

## 2021-08-07 RX ADMIN — SUCRALFATE 1 G: 1 TABLET ORAL at 13:04

## 2021-08-07 RX ADMIN — SUCRALFATE 1 G: 1 TABLET ORAL at 10:02

## 2021-08-07 NOTE — DISCHARGE SUMMARY
Admit date: 8/5/2021   Admitting Provider: Nila Maradiaga MD    Discharge date: 8/7/2021  Discharging Provider: Otis Duncan PA-C      * Admission Diagnoses: GI hemorrhage [K92.2]    * Discharge Diagnoses:    Hospital Problems as of 8/7/2021 Date Reviewed: 8/5/2021        Codes Class Noted - Resolved POA    Syncope ICD-10-CM: R55  ICD-9-CM: 780.2  8/7/2021 - Present Unknown        Acute blood loss anemia ICD-10-CM: D62  ICD-9-CM: 285.1  8/7/2021 - Present Unknown        GI hemorrhage ICD-10-CM: K92.2  ICD-9-CM: 578.9  8/5/2021 - Present Yes              * Hospital Course:   Marisela Hoover a 55 y. o. male who presents with PMH significant for POEMS Syndrome to the ED for chief complaint of melena.  Patient reports 2 episodes of \"dark, bloody\" bowel movements -- once in the afternoon, and once in the evening.  Patient has also been experiencing dizziness that began 1 day prior to admission, which is primarily triggered upon standing and \"looking down. \" Patient developed a syncopal episode with associated dizziness that was witnessed by the patient's wife. Bc Rejidarren additionally reports episodes of hematemesis.  Patient mentions eating \"shrimp\"  and  Had a poor appetite, attributed to food poisoning.  Patient additionally complains of SOB. EGD was performed revealing coffee-ground emesis in the stomach and gastritis as well as esophagitis. Started on IV Protonix, Carafate. Patient has received 2 units packed red blood cells due to acute blood loss anemia. Hemoglobin is stable at 7.9 on date of discharge. Patient be discharged on Protonix and Carafate. He will follow with Dr. Jayne Santiago for colonoscopy in approximately 1 month. Prescriptions for lactulose given as well. Patient ambulating in the hallway with no obvious dizziness. Patient will follow up with Dr. Emiliano Kaplan for laboratory analysis, CBC on Wednesday, August 11.     Impression:     1.  Upper GI bleed  2. POEM syndrome  3.   Acute blood loss anemia           Plan:     1.  Upper GI bleed  Hematemesis and melena reported  EGD revealed coffee-ground emesis with gastritis and esophagitis  Continue Carafate  Continue Protonix twice daily x 30 days  Transfuse for hemoglobin less than 7 or symptoms.     2. POEM syndrome  Stable     3. Acute blood loss anemia  Transfuse for hemoglobin less than 7    Recent Results (from the past 24 hour(s))   CBC W/O DIFF    Collection Time: 08/06/21  3:20 PM   Result Value Ref Range    WBC 7.6 4.1 - 11.1 K/uL    RBC 2.89 (L) 4.10 - 5.70 M/uL    HGB 8.0 (L) 12.1 - 17.0 g/dL    HCT 24.0 (L) 36.6 - 50.3 %    MCV 83.0 80.0 - 99.0 FL    MCH 27.7 26.0 - 34.0 PG    MCHC 33.3 30.0 - 36.5 g/dL    RDW 15.5 (H) 11.5 - 14.5 %    PLATELET 024 (L) 378 - 400 K/uL    MPV 11.4 8.9 - 12.9 FL    NRBC 0.0 0.0  WBC    ABSOLUTE NRBC 0.00 0.00 - 0.01 K/uL   CBC WITH AUTOMATED DIFF    Collection Time: 08/07/21  7:00 AM   Result Value Ref Range    WBC 6.6 4.1 - 11.1 K/uL    RBC 2.87 (L) 4.10 - 5.70 M/uL    HGB 7.9 (L) 12.1 - 17.0 g/dL    HCT 23.8 (L) 36.6 - 50.3 %    MCV 82.9 80.0 - 99.0 FL    MCH 27.5 26.0 - 34.0 PG    MCHC 33.2 30.0 - 36.5 g/dL    RDW 15.3 (H) 11.5 - 14.5 %    PLATELET 683 (L) 400 - 400 K/uL    MPV 11.4 8.9 - 12.9 FL    NRBC 0.0 0.0  WBC    ABSOLUTE NRBC 0.00 0.00 - 0.01 K/uL    NEUTROPHILS 57 32 - 75 %    LYMPHOCYTES 25 12 - 49 %    MONOCYTES 12 5 - 13 %    EOSINOPHILS 4 0 - 7 %    BASOPHILS 1 0 - 1 %    IMMATURE GRANULOCYTES 1 (H) 0 - 0.5 %    ABS. NEUTROPHILS 3.8 1.8 - 8.0 K/UL    ABS. LYMPHOCYTES 1.6 0.8 - 3.5 K/UL    ABS. MONOCYTES 0.8 0.0 - 1.0 K/UL    ABS. EOSINOPHILS 0.3 0.0 - 0.4 K/UL    ABS. BASOPHILS 0.0 0.0 - 0.1 K/UL    ABS. IMM.  GRANS. 0.0 0.00 - 0.04 K/UL    DF AUTOMATED     METABOLIC PANEL, COMPREHENSIVE    Collection Time: 08/07/21  7:00 AM   Result Value Ref Range    Sodium 143 136 - 145 mmol/L    Potassium 3.3 (L) 3.5 - 5.1 mmol/L    Chloride 108 97 - 108 mmol/L    CO2 31 21 - 32 mmol/L    Anion gap 4 (L) 5 - 15 mmol/L    Glucose 129 (H) 65 - 100 mg/dL    BUN 20 6 - 20 mg/dL    Creatinine 0.63 (L) 0.70 - 1.30 mg/dL    BUN/Creatinine ratio 32 (H) 12 - 20      GFR est AA >60 >60 ml/min/1.73m2    GFR est non-AA >60 >60 ml/min/1.73m2    Calcium 7.7 (L) 8.5 - 10.1 mg/dL    Bilirubin, total 0.5 0.2 - 1.0 mg/dL    AST (SGOT) 15 15 - 37 U/L    ALT (SGPT) 22 12 - 78 U/L    Alk. phosphatase 65 45 - 117 U/L    Protein, total 4.5 (L) 6.4 - 8.2 g/dL    Albumin 2.0 (L) 3.5 - 5.0 g/dL    Globulin 2.5 2.0 - 4.0 g/dL    A-G Ratio 0.8 (L) 1.1 - 2.2     GLUCOSE, POC    Collection Time: 08/07/21  8:16 AM   Result Value Ref Range    Glucose (POC) 141 (H) 65 - 117 mg/dL    Performed by Storyvinefarrukh Lei    GLUCOSE, POC    Collection Time: 08/07/21 11:21 AM   Result Value Ref Range    Glucose (POC) 155 (H) 65 - 117 mg/dL    Performed by Storyvinefarrukh Lei        * Procedures:   Procedure(s):  ESOPHAGOGASTRODUODENOSCOPY (EGD)  ESOPHAGOGASTRODUODENAL (EGD) BIOPSY      Consults:   Gastroenterology, Dr. Benson Butler    Significant Diagnostic Studies: As discussed in hospital course    Discharge Exam:  Visit Vitals  /65 (BP 1 Location: Left upper arm, BP Patient Position: At rest)   Pulse 86   Temp 97.3 °F (36.3 °C)   Resp 18   Ht 5' 11\" (1.803 m)   Wt 122.5 kg (270 lb)   SpO2 98%   BMI 37.66 kg/m²     PHYSICAL EXAM:  Constitutional: Alert in no acute distress   HEENT: Sclerae anicteric, The neck is supple. Cardiovascular: Regular rate and rhythm. No murmurs, gallops, or rubs. Erika Sida Respiratory: Clear breath sounds with no wheezes, rales, or rhonchi. GI: Abdomen nondistended, soft, and nontender. Normal active bowel sounds. Rectal: Deferred   Musculoskeletal: No pitting edema of the lower legs. Extremities have good range of motion. Neurological:  Patient is alert and oriented. Cranial nerves II-XII intact  Psychiatric: Mood appears appropriate with judgement intact. Lymphatic: No cervical or supraclavicular adenopathy.    Skin: No rashes or breakdown of the skin      * Discharge Condition: stable  * Disposition: Home    Discharge Medications:  Current Discharge Medication List      START taking these medications    Details   sucralfate (CARAFATE) 1 gram tablet Take 1 Tablet by mouth Before breakfast, lunch, dinner and at bedtime. Qty: 30 Tablet, Refills: 1  Start date: 8/7/2021      pantoprazole (PROTONIX) 40 mg tablet Take 1 Tablet by mouth two (2) times a day. Qty: 30 Tablet, Refills: 1  Start date: 8/7/2021      lactulose (KRISTALOSE) 10 gram packet Take 1 Packet by mouth three (3) times daily. Qty: 10 Packet, Refills: 0  Start date: 8/7/2021         CONTINUE these medications which have NOT CHANGED    Details   methylphenidate HCl (Metadate CD) 40 mg BP30 Take 40 mg by mouth every morning. Max Daily Amount: 40 mg.  Qty: 90 Cap, Refills: 0    Associated Diagnoses: Attention deficit disorder, unspecified hyperactivity presence      zolpidem (AMBIEN) 10 mg tablet Take 1 Tab by mouth nightly as needed for Sleep. Ok to fill 11/10/2015  Qty: 90 Tab, Refills: 3    Associated Diagnoses: Insomnia             * Follow-up Care/Patient Instructions:   Activity: Activity as tolerated  Diet: Regular Diet  Wound Care: None needed    Follow-up Information     Follow up With Specialties Details Why Contact Info    Debora Pretty MD Family Medicine In 3 days  [address] [facility]  [phone]      Luis Daniel Mattson MD Gastroenterology, Internal Medicine In 1 month  [facility]  [phone]            Discharge summary greater than 35 minutes spent with the patient performing discharge instructions, medication review and physical exam    Signed:  Jonelle Suero PA-C  8/7/2021  12:16 PM

## 2021-08-07 NOTE — PROGRESS NOTES
Received patient resting in bed. No c/o pain or discomfort. Bed in low position with wheels locked and call light within reach. 2322 Patient discharged in stable condition. IV removed and gauze placed to site. Discharge instructions and care notes reviewed with and given to patient. Patient able to retain information. Transported to private vehicle by wheelchair. Discharge plan of care/case management plan validated with provider discharge order.

## 2021-08-07 NOTE — PROGRESS NOTES
Progress Note    Patient: Haroldo Cortes MRN: [id number]  SSN: [id number]    YOB: 1965  Age: 55 y.o. Sex: male      Admit Date: 8/5/2021    LOS: 1 day     Subjective:   No dizziness, still felt weak.  No nausea or vomiting   Past Medical History:   Diagnosis Date    Chronic pain     bilateral feet,neuropathy    Neuropathy associated with endocrine disorder ([facility] [address] 3/11/2010    Other ill-defined conditions(799.89) 2001    POEMS Syndrome, seen at [facility] 1808 syndrome 3/11/2010        Current Facility-Administered Medications:     0.9% sodium chloride infusion 250 mL, 250 mL, IntraVENous, PRN, Carlitos Mills PA-C    sucralfate (CARAFATE) tablet 1 g, 1 g, Oral, AC&HS, Gabriela Mills PA-C, 1 g at 08/06/21 2056    pantoprazole (PROTONIX) tablet 40 mg, 40 mg, Oral, BID, Gabriela Mills PA-C, 40 mg at 08/06/21 2056    acetaminophen (TYLENOL) tablet 650 mg, 650 mg, Oral, Q6H PRN, Carlitos Mills PA-C, 650 mg at 08/06/21 1307    0.9% sodium chloride infusion 250 mL, 250 mL, IntraVENous, PRN, Benjamín Harris MD    lactated Ringers infusion, 100 mL/hr, IntraVENous, CONTINUOUS, Yoselyn Mendoza MD, Stopped at 08/06/21 1402    zolpidem (AMBIEN) tablet 5 mg, 5 mg, Oral, QHS PRN, Yoselyn Mendoza MD    sodium chloride (NS) flush 5-40 mL, 5-40 mL, IntraVENous, Q8H, Yoselyn Mendoza MD, 10 mL at 08/06/21 2058    sodium chloride (NS) flush 5-40 mL, 5-40 mL, IntraVENous, PRN, Yoselyn Mendoza MD    ondansetron (ZOFRAN) injection 4 mg, 4 mg, IntraVENous, Q4H PRN, Yoselyn Mendoza MD    [Held by provider] insulin lispro (HUMALOG) injection, , SubCUTAneous, AC&HS, Yoselyn Mendoza MD    glucose chewable tablet 16 g, 4 Tablet, Oral, PRN, Yoselyn Mendoza MD    dextrose (D50W) injection syrg 12.5-25 g, 25-50 mL, IntraVENous, PRN, Yoselyn Mendoza MD    glucagon (GLUCAGEN) injection 1 mg, 1 mg, IntraMUSCular, PRN, [place], Joe Cradle, MD    0.9% sodium chloride infusion 250 mL, 250 mL, IntraVENous, PRN, Alejandra Mtz MD    0.9% sodium chloride infusion 250 mL, 250 mL, IntraVENous, PRN, Bhaskar Beckwith MD    Objective:     Vitals:    08/06/21 1308 08/06/21 1356 08/06/21 1403 08/06/21 2010   BP: 122/69 112/64 115/64 131/71   Pulse: 74 78 72 80   Resp: 18 20 20 20   Temp: 98 °F (36.7 °C) 98 °F (36.7 °C) 98.2 °F (36.8 °C) 98.2 °F (36.8 °C)   SpO2:  98% 98% 98%   Weight:       Height:            Intake and Output:  Current Shift: No intake/output data recorded. Last three shifts: 08/05 0701 - 08/06 1900  In: 978.3 [P.O.:400; I.V.:578.3]  Out: 970 [Urine:970]    Physical Exam:   Physical Exam  Constitutional:       Appearance: He is ill-appearing. HENT:      Head: Normocephalic and atraumatic. Eyes:      Extraocular Movements: Extraocular movements intact. Cardiovascular:      Rate and Rhythm: Regular rhythm. Pulses: Normal pulses. Abdominal:      General: Abdomen is flat. Bowel sounds are normal.      Tenderness: There is no abdominal tenderness. Musculoskeletal:      Cervical back: Normal range of motion and neck supple. Skin:     Coloration: Skin is not jaundiced. Findings: No bruising.    Psychiatric:         Mood and Affect: Mood normal.          Lab/Data Review:  Recent Results (from the past 24 hour(s))   CBC W/O DIFF    Collection Time: 08/06/21  5:29 AM   Result Value Ref Range    WBC 7.2 4.1 - 11.1 K/uL    RBC 2.58 (L) 4.10 - 5.70 M/uL    HGB 7.1 (L) 12.1 - 17.0 g/dL    HCT 21.7 (L) 36.6 - 50.3 %    MCV 84.1 80.0 - 99.0 FL    MCH 27.5 26.0 - 34.0 PG    MCHC 32.7 30.0 - 36.5 g/dL    RDW 15.4 (H) 11.5 - 14.5 %    PLATELET 831 (L) 767 - 400 K/uL    MPV 11.5 8.9 - 12.9 FL    NRBC 0.0 0.0  WBC    ABSOLUTE NRBC 0.00 0.00 - 0.01 K/uL   GLUCOSE, POC    Collection Time: 08/06/21  9:12 AM   Result Value Ref Range    Glucose (POC) 218 (H) 65 - 117 mg/dL    Performed by [address] [place] [address], [facility] Collection Time: 08/06/21 11:32 AM   Result Value Ref Range    Glucose (POC) 121 (H) 65 - 117 mg/dL    Performed by Teagan Rainey    CBC W/O DIFF    Collection Time: 08/06/21  3:20 PM   Result Value Ref Range    WBC 7.6 4.1 - 11.1 K/uL    RBC 2.89 (L) 4.10 - 5.70 M/uL    HGB 8.0 (L) 12.1 - 17.0 g/dL    HCT 24.0 (L) 36.6 - 50.3 %    MCV 83.0 80.0 - 99.0 FL    MCH 27.7 26.0 - 34.0 PG    MCHC 33.3 30.0 - 36.5 g/dL    RDW 15.5 (H) 11.5 - 14.5 %    PLATELET 984 (L) 717 - 400 K/uL    MPV 11.4 8.9 - 12.9 FL    NRBC 0.0 0.0  WBC    ABSOLUTE NRBC 0.00 0.00 - 0.01 K/uL        CTA ABDOMEN PELV W CONT   Final Result      No contrast extravasation. No inflammatory changes or bowel obstruction. Renal   lesions are too small to characterize. Follow-up as clinically indicated. Please   see full report.        GI bleeding,    , NSAIDs,  Gastritis  Anemia, postop GI bleeding,  History of p.o. EMS,     Plan:     More blood transfusion  Fall precautions, high risk  Continue monitor hemoglobin closely,  PPI po  Outpatient colonoscopy for anemia/crc screening  Discussed with patient and attending physician          Assessment:     Active Problems:    GI hemorrhage (8/5/2021)        Plan:       Signed By: Althea De León MD     August 6, 2021        Thank you for allowing me to participate in this patients care  Cc Referring Physician   Dallas Funk MD

## 2021-08-09 LAB
ABO + RH BLD: NORMAL
BLD PROD TYP BPU: NORMAL
BLOOD GROUP ANTIBODIES SERPL: NEGATIVE
BPU ID: NORMAL
CROSSMATCH RESULT,%XM: NORMAL
SPECIMEN EXP DATE BLD: NORMAL
STATUS OF UNIT,%ST: NORMAL
TRANSFUSION STATUS PATIENT QL: NORMAL
UNIT DIVISION, %UDIV: 0

## 2021-08-11 ENCOUNTER — OFFICE VISIT (OUTPATIENT)
Dept: FAMILY MEDICINE CLINIC | Age: 56
End: 2021-08-11
Payer: COMMERCIAL

## 2021-08-11 VITALS
DIASTOLIC BLOOD PRESSURE: 66 MMHG | WEIGHT: 284 LBS | SYSTOLIC BLOOD PRESSURE: 104 MMHG | BODY MASS INDEX: 39.76 KG/M2 | RESPIRATION RATE: 16 BRPM | HEIGHT: 71 IN | TEMPERATURE: 98.1 F | HEART RATE: 81 BPM | OXYGEN SATURATION: 98 %

## 2021-08-11 DIAGNOSIS — E88.09 POEMS SYNDROME: ICD-10-CM

## 2021-08-11 DIAGNOSIS — K29.71 GASTROINTESTINAL HEMORRHAGE ASSOCIATED WITH GASTRITIS, UNSPECIFIED GASTRITIS TYPE: Primary | ICD-10-CM

## 2021-08-11 DIAGNOSIS — K27.9 PUD (PEPTIC ULCER DISEASE): ICD-10-CM

## 2021-08-11 DIAGNOSIS — D62 ACUTE BLOOD LOSS ANEMIA: ICD-10-CM

## 2021-08-11 LAB
ALBUMIN SERPL-MCNC: 2.4 G/DL (ref 3.5–5)
ALBUMIN/GLOB SERPL: 0.9 {RATIO} (ref 1.1–2.2)
ALP SERPL-CCNC: 73 U/L (ref 45–117)
ALT SERPL-CCNC: 26 U/L (ref 12–78)
ANION GAP SERPL CALC-SCNC: 8 MMOL/L (ref 5–15)
AST SERPL-CCNC: 20 U/L (ref 15–37)
BASOPHILS # BLD: 0 K/UL (ref 0–0.1)
BASOPHILS NFR BLD: 0 % (ref 0–1)
BILIRUB SERPL-MCNC: 0.9 MG/DL (ref 0.2–1)
BUN SERPL-MCNC: 17 MG/DL (ref 6–20)
BUN/CREAT SERPL: 26 (ref 12–20)
CALCIUM SERPL-MCNC: 7.7 MG/DL (ref 8.5–10.1)
CHLORIDE SERPL-SCNC: 105 MMOL/L (ref 97–108)
CO2 SERPL-SCNC: 27 MMOL/L (ref 21–32)
CREAT SERPL-MCNC: 0.66 MG/DL (ref 0.7–1.3)
DIFFERENTIAL METHOD BLD: ABNORMAL
EOSINOPHIL # BLD: 0.3 K/UL (ref 0–0.4)
EOSINOPHIL NFR BLD: 4 % (ref 0–7)
ERYTHROCYTE [DISTWIDTH] IN BLOOD BY AUTOMATED COUNT: 15.7 % (ref 11.5–14.5)
FERRITIN SERPL-MCNC: 7 NG/ML (ref 26–388)
GLOBULIN SER CALC-MCNC: 2.7 G/DL (ref 2–4)
GLUCOSE SERPL-MCNC: 178 MG/DL (ref 65–100)
HCT VFR BLD AUTO: 23.3 % (ref 36.6–50.3)
HGB BLD-MCNC: 7.3 G/DL (ref 12.1–17)
IMM GRANULOCYTES # BLD AUTO: 0 K/UL (ref 0–0.04)
IMM GRANULOCYTES NFR BLD AUTO: 1 % (ref 0–0.5)
IRON SATN MFR SERPL: 6 % (ref 20–50)
IRON SERPL-MCNC: 18 UG/DL (ref 35–150)
LYMPHOCYTES # BLD: 1.1 K/UL (ref 0.8–3.5)
LYMPHOCYTES NFR BLD: 20 % (ref 12–49)
MCH RBC QN AUTO: 27.9 PG (ref 26–34)
MCHC RBC AUTO-ENTMCNC: 31.3 G/DL (ref 30–36.5)
MCV RBC AUTO: 88.9 FL (ref 80–99)
MONOCYTES # BLD: 0.7 K/UL (ref 0–1)
MONOCYTES NFR BLD: 13 % (ref 5–13)
NEUTS SEG # BLD: 3.6 K/UL (ref 1.8–8)
NEUTS SEG NFR BLD: 62 % (ref 32–75)
NRBC # BLD: 0 K/UL (ref 0–0.01)
NRBC BLD-RTO: 0 PER 100 WBC
PLATELET # BLD AUTO: 168 K/UL (ref 150–400)
PMV BLD AUTO: 11.7 FL (ref 8.9–12.9)
POTASSIUM SERPL-SCNC: 3.9 MMOL/L (ref 3.5–5.1)
PROT SERPL-MCNC: 5.1 G/DL (ref 6.4–8.2)
RBC # BLD AUTO: 2.62 M/UL (ref 4.1–5.7)
SODIUM SERPL-SCNC: 140 MMOL/L (ref 136–145)
TIBC SERPL-MCNC: 295 UG/DL (ref 250–450)
WBC # BLD AUTO: 5.7 K/UL (ref 4.1–11.1)

## 2021-08-11 PROCEDURE — 99214 OFFICE O/P EST MOD 30 MIN: CPT | Performed by: FAMILY MEDICINE

## 2021-08-11 NOTE — PROGRESS NOTES
Chief Complaint   Patient presents with   [facility] Follow Up     [address] [facility] ED 8/5/21  GI Bleed    Labs     NON Fasting    Shortness of Breath     Fatigue    Medication Refill     . 1. Have you been to the ER, urgent care clinic since your last visit? Hospitalized since your last visit? Yes Where: [facility] ED 8/5/21  GI Bleed    2. Have you seen or consulted any other health care providers outside of the [address] [place] [address] since your last visit? Include any pap smears or colon screening. No             Chief Complaint   Patient presents with   [facility] Follow Up     [address] [facility] ED 8/5/21  GI Bleed    Labs     NON Fasting    Shortness of Breath     Fatigue    Medication Refill     He is a 55 y.o. male who presents for evalution. Reviewed PmHx, RxHx, FmHx, SocHx, AllgHx and updated and dated in the chart. Patient Active Problem List    Diagnosis    PUD (peptic ulcer disease)    Syncope    Acute blood loss anemia    GI hemorrhage    Severe obesity ([facility] [address]    ADD (attention deficit disorder)    Insomnia    POEMS syndrome    Neuropathy associated with endocrine disorder ([facility] [address]       Review of Systems - negative except as listed above in the HPI    Objective:     Vitals:    08/11/21 1100   BP: 104/66   Pulse: 81   Resp: 16   Temp: 98.1 °F (36.7 °C)   TempSrc: Oral   SpO2: 98%   Weight: 284 lb (128.8 kg)   Height: 5' 11\" (1.803 m)         Assessment/ Plan:   Diagnoses and all orders for this visit:    1. Gastrointestinal hemorrhage associated with gastritis, unspecified gastritis type  -     CBC WITH AUTOMATED DIFF; Future  -dc Hbg=7.9 after 3 units, see dc summary    2. Acute blood loss anemia  -     CBC WITH AUTOMATED DIFF; Future  -     IRON PROFILE; Future  -     FERRITIN; Future    3. POEMS syndrome  -     METABOLIC PANEL, COMPREHENSIVE; Future  -refer to specialist    4.  PUD (peptic ulcer disease)  -refer to GI  -getting scope as well         Follow-up and Dispositions    · Return in about 1 week (around 8/18/2021). I have discussed the diagnosis with the patient and the intended plan as seen in the above orders. The patient understands and agrees with the plan. The patient has received an after-visit summary and questions were answered concerning future plans. Medication Side Effects and Warnings were discussed with patient  Patient Labs were reviewed and or requested:  Patient Past Records were reviewed and or requested    Michael Srinivasan M.D. There are no Patient Instructions on file for this visit.

## 2021-08-12 RX ORDER — IRON POLYSACCHARIDE COMPLEX 150 MG
150 CAPSULE ORAL 2 TIMES DAILY
Qty: 60 CAPSULE | Refills: 2 | Status: SHIPPED | OUTPATIENT
Start: 2021-08-12 | End: 2021-09-20 | Stop reason: ALTCHOICE

## 2021-08-15 ENCOUNTER — HOSPITAL ENCOUNTER (EMERGENCY)
Age: 56
Discharge: HOME | End: 2021-08-15
Attending: EMERGENCY MEDICINE
Payer: COMMERCIAL

## 2021-08-15 ENCOUNTER — APPOINTMENT (OUTPATIENT)
Dept: GENERAL RADIOLOGY | Age: 56
End: 2021-08-15
Attending: EMERGENCY MEDICINE
Payer: COMMERCIAL

## 2021-08-15 VITALS
DIASTOLIC BLOOD PRESSURE: 69 MMHG | RESPIRATION RATE: 18 BRPM | HEIGHT: 71 IN | OXYGEN SATURATION: 98 % | HEART RATE: 81 BPM | TEMPERATURE: 98.3 F | WEIGHT: 273 LBS | SYSTOLIC BLOOD PRESSURE: 114 MMHG | BODY MASS INDEX: 38.22 KG/M2

## 2021-08-15 DIAGNOSIS — J20.9 ACUTE BRONCHITIS, UNSPECIFIED ORGANISM: Primary | ICD-10-CM

## 2021-08-15 LAB
ABO + RH BLD: NORMAL
ANION GAP SERPL CALC-SCNC: 2 MMOL/L (ref 5–15)
APTT PPP: 28.6 SEC (ref 21.2–34.1)
BASOPHILS # BLD: 0 K/UL (ref 0–0.1)
BASOPHILS NFR BLD: 1 % (ref 0–1)
BLOOD GROUP ANTIBODIES SERPL: NEGATIVE
BUN SERPL-MCNC: 15 MG/DL (ref 6–20)
BUN/CREAT SERPL: 19 (ref 12–20)
CA-I BLD-MCNC: 8.3 MG/DL (ref 8.5–10.1)
CHLORIDE SERPL-SCNC: 110 MMOL/L (ref 97–108)
CK SERPL-CCNC: 108 NG/ML (ref 39–308)
CO2 SERPL-SCNC: 29 MMOL/L (ref 21–32)
CREAT SERPL-MCNC: 0.78 MG/DL (ref 0.7–1.3)
DIFFERENTIAL METHOD BLD: ABNORMAL
EOSINOPHIL # BLD: 0.3 K/UL (ref 0–0.4)
EOSINOPHIL NFR BLD: 5 % (ref 0–7)
ERYTHROCYTE [DISTWIDTH] IN BLOOD BY AUTOMATED COUNT: 15.1 % (ref 11.5–14.5)
GLUCOSE SERPL-MCNC: 83 MG/DL (ref 65–100)
HCT VFR BLD AUTO: 23.5 % (ref 36.6–50.3)
HGB BLD-MCNC: 7.4 G/DL (ref 12.1–17)
IMM GRANULOCYTES # BLD AUTO: 0 K/UL (ref 0–0.04)
IMM GRANULOCYTES NFR BLD AUTO: 0 % (ref 0–0.5)
INR PPP: 1.1 (ref 0.9–1.1)
LYMPHOCYTES # BLD: 1.3 K/UL (ref 0.8–3.5)
LYMPHOCYTES NFR BLD: 21 % (ref 12–49)
MCH RBC QN AUTO: 26.4 PG (ref 26–34)
MCHC RBC AUTO-ENTMCNC: 31.5 G/DL (ref 30–36.5)
MCV RBC AUTO: 83.9 FL (ref 80–99)
MONOCYTES # BLD: 0.8 K/UL (ref 0–1)
MONOCYTES NFR BLD: 13 % (ref 5–13)
NEUTS SEG # BLD: 3.7 K/UL (ref 1.8–8)
NEUTS SEG NFR BLD: 60 % (ref 32–75)
NRBC # BLD: 0 K/UL (ref 0–0.01)
NRBC BLD-RTO: 0 PER 100 WBC
PLATELET # BLD AUTO: 239 K/UL (ref 150–400)
PMV BLD AUTO: 11.3 FL (ref 8.9–12.9)
POTASSIUM SERPL-SCNC: 4.3 MMOL/L (ref 3.5–5.1)
PROTHROMBIN TIME: 14 SEC (ref 11.9–14.7)
RBC # BLD AUTO: 2.8 M/UL (ref 4.1–5.7)
SODIUM SERPL-SCNC: 141 MMOL/L (ref 136–145)
SPECIMEN EXP DATE BLD: NORMAL
THERAPEUTIC RANGE,PTTT: NORMAL SEC (ref 82–109)
TROPONIN I SERPL-MCNC: <0.05 NG/ML
WBC # BLD AUTO: 6.1 K/UL (ref 4.1–11.1)

## 2021-08-15 PROCEDURE — 84484 ASSAY OF TROPONIN QUANT: CPT

## 2021-08-15 PROCEDURE — 85025 COMPLETE CBC W/AUTO DIFF WBC: CPT

## 2021-08-15 PROCEDURE — 86901 BLOOD TYPING SEROLOGIC RH(D): CPT

## 2021-08-15 PROCEDURE — 80048 BASIC METABOLIC PNL TOTAL CA: CPT

## 2021-08-15 PROCEDURE — 85730 THROMBOPLASTIN TIME PARTIAL: CPT

## 2021-08-15 PROCEDURE — 36415 COLL VENOUS BLD VENIPUNCTURE: CPT

## 2021-08-15 PROCEDURE — 82550 ASSAY OF CK (CPK): CPT

## 2021-08-15 PROCEDURE — 93005 ELECTROCARDIOGRAM TRACING: CPT

## 2021-08-15 PROCEDURE — 71045 X-RAY EXAM CHEST 1 VIEW: CPT

## 2021-08-15 PROCEDURE — 99282 EMERGENCY DEPT VISIT SF MDM: CPT

## 2021-08-15 PROCEDURE — 85610 PROTHROMBIN TIME: CPT

## 2021-08-15 RX ORDER — PREDNISONE 20 MG/1
60 TABLET ORAL DAILY
Qty: 15 TABLET | Refills: 0 | Status: SHIPPED | OUTPATIENT
Start: 2021-08-15 | End: 2021-08-20

## 2021-08-15 RX ORDER — PREDNISONE 20 MG/1
60 TABLET ORAL DAILY
Qty: 15 TABLET | Refills: 0 | Status: SHIPPED | OUTPATIENT
Start: 2021-08-15 | End: 2021-08-15

## 2021-08-15 NOTE — ED TRIAGE NOTES
SOB with ambulation since discharge 1 week ago, exacerbated to SOB when lying down. New Hx of anemia due to stomach ulcers.

## 2021-08-15 NOTE — DISCHARGE INSTRUCTIONS
Thank you! Thank you for allowing me to care for you in the emergency department. I sincerely hope that you are satisfied with your visit today. It is my goal to provide you with excellent care. Below you will find a list of your labs and imaging from your visit today. Should you have any questions regarding these results please do not hesitate to call the emergency department. Labs -     Recent Results (from the past 12 hour(s))   CBC WITH AUTOMATED DIFF    Collection Time: 08/15/21  3:30 PM   Result Value Ref Range    WBC 6.1 4.1 - 11.1 K/uL    RBC 2.80 (L) 4.10 - 5.70 M/uL    HGB 7.4 (L) 12.1 - 17.0 g/dL    HCT 23.5 (L) 36.6 - 50.3 %    MCV 83.9 80.0 - 99.0 FL    MCH 26.4 26.0 - 34.0 PG    MCHC 31.5 30.0 - 36.5 g/dL    RDW 15.1 (H) 11.5 - 14.5 %    PLATELET 429 822 - 238 K/uL    MPV 11.3 8.9 - 12.9 FL    NRBC 0.0 0.0  WBC    ABSOLUTE NRBC 0.00 0.00 - 0.01 K/uL    NEUTROPHILS 60 32 - 75 %    LYMPHOCYTES 21 12 - 49 %    MONOCYTES 13 5 - 13 %    EOSINOPHILS 5 0 - 7 %    BASOPHILS 1 0 - 1 %    IMMATURE GRANULOCYTES 0 0 - 0.5 %    ABS. NEUTROPHILS 3.7 1.8 - 8.0 K/UL    ABS. LYMPHOCYTES 1.3 0.8 - 3.5 K/UL    ABS. MONOCYTES 0.8 0.0 - 1.0 K/UL    ABS. EOSINOPHILS 0.3 0.0 - 0.4 K/UL    ABS. BASOPHILS 0.0 0.0 - 0.1 K/UL    ABS. IMM.  GRANS. 0.0 0.00 - 0.04 K/UL    DF AUTOMATED     CK W/ REFLX CKMB    Collection Time: 08/15/21  3:30 PM   Result Value Ref Range    .0 39 - 308 ng/mL   TROPONIN I    Collection Time: 08/15/21  3:30 PM   Result Value Ref Range    Troponin-I, Qt. <0.05 <0.05 ng/mL   PROTHROMBIN TIME + INR    Collection Time: 08/15/21  3:30 PM   Result Value Ref Range    Prothrombin time 14.0 11.9 - 14.7 sec    INR 1.1 0.9 - 1.1     PTT    Collection Time: 08/15/21  3:30 PM   Result Value Ref Range    aPTT 28.6 21.2 - 34.1 sec    aPTT, therapeutic range   82 - 743 sec   METABOLIC PANEL, BASIC    Collection Time: 08/15/21  3:30 PM   Result Value Ref Range    Sodium 141 136 - 145 mmol/L Potassium 4.3 3.5 - 5.1 mmol/L    Chloride 110 (H) 97 - 108 mmol/L    CO2 29 21 - 32 mmol/L    Anion gap 2 (L) 5 - 15 mmol/L    Glucose 83 65 - 100 mg/dL    BUN 15 6 - 20 mg/dL    Creatinine 0.78 0.70 - 1.30 mg/dL    BUN/Creatinine ratio 19 12 - 20      GFR est AA >60 >60 ml/min/1.73m2    GFR est non-AA >60 >60 ml/min/1.73m2    Calcium 8.3 (L) 8.5 - 10.1 mg/dL       Radiologic Studies -   XR CHEST PORT   Final Result   No acute process. Stable appearance of TIPS. CT Results  (Last 48 hours)      None          CXR Results  (Last 48 hours)                 08/15/21 1550  XR CHEST PORT Final result    Impression:  No acute process. Stable appearance of TIPS. Narrative:  Chest, PA view. Comparison with 12/22/2011. The heart, mediastinum, and pulmonary vasculature appear within normal limits. No evidence of pulmonary edema, air space pneumonia, or pleural effusion. No   evidence of pneumothorax. Stent graft overlies the right upper abdomen, directed   towards the right atrium. If you feel that you have not received excellent quality care or timely care, please ask to speak to the nurse manager. Please choose us in the future for your continued health care needs. ------------------------------------------------------------------------------------------------------------  The exam and treatment you received in the Emergency Department were for an urgent problem and are not intended as complete care. It is important that you follow-up with a doctor, nurse practitioner, or physician assistant to:  (1) confirm your diagnosis,  (2) re-evaluation of changes in your illness and treatment, and  (3) for ongoing care. If your symptoms become worse or you do not improve as expected and you are unable to reach your usual health care provider, you should return to the Emergency Department. We are available 24 hours a day.      Please take your discharge instructions with you when you go to your follow-up appointment. If you have any problem arranging a follow-up appointment, contact the Emergency Department immediately. If a prescription has been provided, please have it filled as soon as possible to prevent a delay in treatment. Read the entire medication instruction sheet provided to you by the pharmacy. If you have any questions or reservations about taking the medication due to side effects or interactions with other medications, please call your primary care physician or contact the ER to speak with the charge nurse. Make an appointment with your family doctor or the physician you were referred to for follow-up of this visit as instructed on your discharge paperwork, as this is a mandatory follow-up. Return to the ER if you are unable to be seen or if you are unable to be seen in a timely manner. If you have any problem arranging the follow-up visit, contact the Emergency Department immediately.

## 2021-08-15 NOTE — ED PROVIDER NOTES
EMERGENCY DEPARTMENT HISTORY AND PHYSICAL EXAM      Date: 8/15/2021  Patient Name: Taras Burt      History of Presenting Illness     Chief Complaint   Patient presents with    Shortness of Breath       History Provided By: Patient    HPI: Taras Burt, 55 y.o. male with a past medical history significant Recent GI bleed presents to the ED with cc of shortness of breath that began since he was discharged from the hospital 8 days ago. Patient states that he has been short of breath with any type of activity since then. He also describes some discomfort in his chest when he walks. He says he is having increasing difficulty walking across the room. He denies any further bleeding. He denies any other fever, chills, nausea, vomiting, rash, diarrhea, headache, night sweats, weight loss or GI bleeding. Patient's not treated his symptoms with anything. His symptoms are mild to moderate at this point  There are no other complaints, changes, or physical findings at this time. PCP: Billy Nazario MD    Current Outpatient Medications   Medication Sig Dispense Refill    albuterol sulfate (PROAIR RESPICLICK) 90 mcg/actuation breath activated inhaler Take 2 Puffs by inhalation every four (4) hours for 10 days. With spacer 1 Inhaler 2    predniSONE (DELTASONE) 20 mg tablet Take 60 mg by mouth daily for 5 days. 15 Tablet 0    iron polysaccharides (Nu-Iron) 150 mg iron capsule Take 1 Capsule by mouth two (2) times a day. 60 Capsule 2    sucralfate (CARAFATE) 1 gram tablet Take 1 Tablet by mouth Before breakfast, lunch, dinner and at bedtime. 30 Tablet 1    pantoprazole (PROTONIX) 40 mg tablet Take 1 Tablet by mouth two (2) times a day. 30 Tablet 1    lactulose (KRISTALOSE) 10 gram packet Take 1 Packet by mouth three (3) times daily. (Patient not taking: Reported on 8/11/2021) 10 Packet 0    methylphenidate HCl (Metadate CD) 40 mg BP30 Take 40 mg by mouth every morning.  Max Daily Amount: 40 mg. 90 Cap 0    zolpidem (AMBIEN) 10 mg tablet Take 1 Tab by mouth nightly as needed for Sleep. 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       Past Surgical History:  Past Surgical History:   Procedure Laterality Date    BIOPSY LIVER      CELL COUNT, CSF      x 2    COLONOSCOPY      Upper and Lower     FOOT/TOES SURGERY PROC UNLISTED      Remove Nerve from Left foot     HX GI      liver stent    HX ORTHOPAEDIC      MRI BRAIN W WO CONT      STENT INSERTION      Liver to Kidneys        Family History:  Family History   Family history unknown: Yes       Social History:  Social History     Tobacco Use    Smoking status: Never Smoker    Smokeless tobacco: Never Used   Substance Use Topics    Alcohol use: Yes    Drug use: No       Allergies: Allergies   Allergen Reactions    Heparin (Bovine) Other (comments)     Questionable per patient ,back lesions. Review of Systems     Review of Systems   Constitutional: Negative. Negative for appetite change, chills, fatigue and fever. HENT: Negative. Negative for congestion and sinus pain. Eyes: Negative. Negative for pain and visual disturbance. Respiratory: Positive for chest tightness and shortness of breath. Cardiovascular: Negative. Negative for chest pain. Gastrointestinal: Negative. Negative for abdominal pain, diarrhea, nausea and vomiting. Genitourinary: Negative. Negative for difficulty urinating. No discharge   Musculoskeletal: Negative. Negative for arthralgias. Skin: Negative. Negative for rash. Neurological: Negative. Negative for weakness and headaches. Hematological: Negative. Psychiatric/Behavioral: Negative.   Negative for agitation. The patient is not nervous/anxious. All other systems reviewed and are negative. Physical Exam     Physical Exam  Vitals and nursing note reviewed. Constitutional:       General: He is not in acute distress. Appearance: He is well-developed. HENT:      Head: Normocephalic and atraumatic. Nose: Nose normal.      Mouth/Throat:      Mouth: Mucous membranes are moist.      Pharynx: Oropharynx is clear. No oropharyngeal exudate. Eyes:      General:         Right eye: No discharge. Left eye: No discharge. Conjunctiva/sclera: Conjunctivae normal.      Pupils: Pupils are equal, round, and reactive to light. Cardiovascular:      Rate and Rhythm: Normal rate and regular rhythm. Chest Wall: PMI is not displaced. No thrill. Heart sounds: Normal heart sounds. No murmur heard. No friction rub. No gallop. Comments: 2 out of 6 systolic ejection murmur  Pulmonary:      Effort: Pulmonary effort is normal. No respiratory distress. Breath sounds: Normal breath sounds. No wheezing or rales. Chest:      Chest wall: No tenderness. Abdominal:      General: Bowel sounds are normal. There is no distension. Palpations: Abdomen is soft. There is no mass. Tenderness: There is no abdominal tenderness. There is no guarding or rebound. Musculoskeletal:         General: Normal range of motion. Cervical back: Normal range of motion and neck supple. Lymphadenopathy:      Cervical: No cervical adenopathy. Skin:     General: Skin is warm and dry. Capillary Refill: Capillary refill takes less than 2 seconds. Findings: No erythema or rash. Neurological:      Mental Status: He is alert and oriented to person, place, and time. Cranial Nerves: No cranial nerve deficit.       Coordination: Coordination normal.   Psychiatric:         Mood and Affect: Mood normal.         Behavior: Behavior normal.         Lab and Diagnostic Study Results     Labs -     Recent Results (from the past 12 hour(s))   CBC WITH AUTOMATED DIFF    Collection Time: 08/15/21  3:30 PM   Result Value Ref Range    WBC 6.1 4.1 - 11.1 K/uL    RBC 2.80 (L) 4.10 - 5.70 M/uL    HGB 7.4 (L) 12.1 - 17.0 g/dL    HCT 23.5 (L) 36.6 - 50.3 %    MCV 83.9 80.0 - 99.0 FL    MCH 26.4 26.0 - 34.0 PG    MCHC 31.5 30.0 - 36.5 g/dL    RDW 15.1 (H) 11.5 - 14.5 %    PLATELET 994 870 - 795 K/uL    MPV 11.3 8.9 - 12.9 FL    NRBC 0.0 0.0  WBC    ABSOLUTE NRBC 0.00 0.00 - 0.01 K/uL    NEUTROPHILS 60 32 - 75 %    LYMPHOCYTES 21 12 - 49 %    MONOCYTES 13 5 - 13 %    EOSINOPHILS 5 0 - 7 %    BASOPHILS 1 0 - 1 %    IMMATURE GRANULOCYTES 0 0 - 0.5 %    ABS. NEUTROPHILS 3.7 1.8 - 8.0 K/UL    ABS. LYMPHOCYTES 1.3 0.8 - 3.5 K/UL    ABS. MONOCYTES 0.8 0.0 - 1.0 K/UL    ABS. EOSINOPHILS 0.3 0.0 - 0.4 K/UL    ABS. BASOPHILS 0.0 0.0 - 0.1 K/UL    ABS. IMM.  GRANS. 0.0 0.00 - 0.04 K/UL    DF AUTOMATED     CK W/ REFLX CKMB    Collection Time: 08/15/21  3:30 PM   Result Value Ref Range    .0 39 - 308 ng/mL   TROPONIN I    Collection Time: 08/15/21  3:30 PM   Result Value Ref Range    Troponin-I, Qt. <0.05 <0.05 ng/mL   PROTHROMBIN TIME + INR    Collection Time: 08/15/21  3:30 PM   Result Value Ref Range    Prothrombin time 14.0 11.9 - 14.7 sec    INR 1.1 0.9 - 1.1     PTT    Collection Time: 08/15/21  3:30 PM   Result Value Ref Range    aPTT 28.6 21.2 - 34.1 sec    aPTT, therapeutic range   82 - 020 sec   METABOLIC PANEL, BASIC    Collection Time: 08/15/21  3:30 PM   Result Value Ref Range    Sodium 141 136 - 145 mmol/L    Potassium 4.3 3.5 - 5.1 mmol/L    Chloride 110 (H) 97 - 108 mmol/L    CO2 29 21 - 32 mmol/L    Anion gap 2 (L) 5 - 15 mmol/L    Glucose 83 65 - 100 mg/dL    BUN 15 6 - 20 mg/dL    Creatinine 0.78 0.70 - 1.30 mg/dL    BUN/Creatinine ratio 19 12 - 20      GFR est AA >60 >60 ml/min/1.73m2    GFR est non-AA >60 >60 ml/min/1.73m2    Calcium 8.3 (L) 8.5 - 10.1 mg/dL       Radiologic Studies - [unfilled]  CT Results  (Last 48 hours)    None        CXR Results  (Last 48 hours)               08/15/21 1550  XR CHEST PORT Final result    Impression:  No acute process. Stable appearance of TIPS. Narrative:  Chest, PA view. Comparison with 12/22/2011. The heart, mediastinum, and pulmonary vasculature appear within normal limits. No evidence of pulmonary edema, air space pneumonia, or pleural effusion. No   evidence of pneumothorax. Stent graft overlies the right upper abdomen, directed   towards the right atrium. Medical Decision Making and ED Course   - I am the first and primary provider for this patient AND AM THE PRIMARY PROVIDER OF RECORD. - I reviewed the vital signs, available nursing notes, past medical history, past surgical history, family history and social history. - Initial assessment performed. The patients presenting problems have been discussed, and the staff are in agreement with the care plan formulated and outlined with them. I have encouraged them to ask questions as they arise throughout their visit. Vital Signs-Reviewed the patient's vital signs. Patient Vitals for the past 12 hrs:   Temp Pulse Resp BP SpO2   08/15/21 1519 98.3 °F (36.8 °C) 81 18 114/69 98 %       EKG interpretation: (Preliminary): Performed at 1526, and read at 1529  Ventricular rate 70 bpm,  ms, QRS duration 106 ms,  ms. Interpretation: Normal sinus rhythm with normal EKG. Records Reviewed: Nursing Notes and Old Medical Records    The patient presents with shortness of breath with a differential diagnosis of ACS, arrhythmia, acute blood loss anemia, pneumonia. ED Course:              Provider Notes (Medical Decision Making):   61-year-old male with some shortness of breath with activity has been going on for about the past week. Has a negative work-up in the emergency room.   Believe the patient certainly could still be recovering from having low blood volumes but he said no further bleeding at this point time blood level appears to be stable. EKG is within normal limits chest x-ray is clear and is not appreciably hypoxic. Will treat with albuterol and steroids as an outpatient have him follow-up with his PCP  MATA           Consultations:       Consultations: - NONE        Procedures and Critical Care       Performed by: Joseph Jules MD  PROCEDURES:  Procedures       Disposition     Disposition: Condition stable    Discharged    Remove if not discharged  DISCHARGE PLAN:  1. Current Discharge Medication List      CONTINUE these medications which have NOT CHANGED    Details   iron polysaccharides (Nu-Iron) 150 mg iron capsule Take 1 Capsule by mouth two (2) times a day. Qty: 60 Capsule, Refills: 2      sucralfate (CARAFATE) 1 gram tablet Take 1 Tablet by mouth Before breakfast, lunch, dinner and at bedtime. Qty: 30 Tablet, Refills: 1      pantoprazole (PROTONIX) 40 mg tablet Take 1 Tablet by mouth two (2) times a day. Qty: 30 Tablet, Refills: 1      lactulose (KRISTALOSE) 10 gram packet Take 1 Packet by mouth three (3) times daily. Qty: 10 Packet, Refills: 0      methylphenidate HCl (Metadate CD) 40 mg BP30 Take 40 mg by mouth every morning. Max Daily Amount: 40 mg.  Qty: 90 Cap, Refills: 0    Associated Diagnoses: Attention deficit disorder, unspecified hyperactivity presence      zolpidem (AMBIEN) 10 mg tablet Take 1 Tab by mouth nightly as needed for Sleep. Ok to fill 11/10/2015  Qty: 90 Tab, Refills: 3    Associated Diagnoses: Insomnia           2. Follow-up Information     Follow up With Specialties Details Why Contact Info    Nicki Meraz MD Family Medicine Call in 2 days As needed, If symptoms worsen [address] [facility]  [phone]          3. Return to ED if worse   4.    Current Discharge Medication List      START taking these medications    Details   albuterol sulfate (PROAIR RESPICLICK) 90 mcg/actuation breath activated inhaler Take 2 Puffs by inhalation every four (4) hours for 10 days. With spacer  Qty: 1 Inhaler, Refills: 2  Start date: 8/15/2021, End date: 8/25/2021      predniSONE (DELTASONE) 20 mg tablet Take 60 mg by mouth daily for 5 days. Qty: 15 Tablet, Refills: 0  Start date: 8/15/2021, End date: 8/20/2021             Diagnosis     Clinical Impression:   1. Acute bronchitis, unspecified organism        Attestations:    Ruth Toledo MD    Please note that this dictation was completed with [employer], the [employer] voice recognition software. Quite often unanticipated grammatical, syntax, homophones, and other interpretive errors are inadvertently transcribed by the computer software. Please disregard these errors. Please excuse any errors that have escaped final proofreading. Thank you.

## 2021-08-16 LAB
ATRIAL RATE: 70 BPM
CALCULATED P AXIS, ECG09: 5 DEGREES
CALCULATED R AXIS, ECG10: 60 DEGREES
CALCULATED T AXIS, ECG11: 76 DEGREES
DIAGNOSIS, 93000: NORMAL
P-R INTERVAL, ECG05: 156 MS
Q-T INTERVAL, ECG07: 428 MS
QRS DURATION, ECG06: 106 MS
QTC CALCULATION (BEZET), ECG08: 462 MS
VENTRICULAR RATE, ECG03: 70 BPM

## 2021-08-16 RX ORDER — SUCRALFATE 1 G/1
1 TABLET ORAL
Qty: 120 TABLET | Refills: 5 | Status: SHIPPED | OUTPATIENT
Start: 2021-08-16 | End: 2021-09-20 | Stop reason: ALTCHOICE

## 2021-08-18 ENCOUNTER — OFFICE VISIT (OUTPATIENT)
Dept: FAMILY MEDICINE CLINIC | Age: 56
End: 2021-08-18
Payer: COMMERCIAL

## 2021-08-18 VITALS
HEART RATE: 78 BPM | OXYGEN SATURATION: 99 % | WEIGHT: 277 LBS | HEIGHT: 71 IN | SYSTOLIC BLOOD PRESSURE: 123 MMHG | TEMPERATURE: 97.8 F | RESPIRATION RATE: 20 BRPM | DIASTOLIC BLOOD PRESSURE: 75 MMHG | BODY MASS INDEX: 38.78 KG/M2

## 2021-08-18 DIAGNOSIS — D62 ACUTE BLOOD LOSS ANEMIA: ICD-10-CM

## 2021-08-18 DIAGNOSIS — K92.0 GASTROINTESTINAL HEMORRHAGE WITH HEMATEMESIS: Primary | ICD-10-CM

## 2021-08-18 PROCEDURE — 99213 OFFICE O/P EST LOW 20 MIN: CPT | Performed by: FAMILY MEDICINE

## 2021-08-18 NOTE — PROGRESS NOTES
Chief Complaint   Patient presents with    Shortness of Breath     With minor exertion    Labs     NON Fasting     1. Have you been to the ER, urgent care clinic since your last visit? Hospitalized since your last visit? Yes Where: [facility] ED 8/15/21 SOB    2. Have you seen or consulted any other health care providers outside of the [address] [place] [address] since your last visit? Include any pap smears or colon screening. No            Chief Complaint   Patient presents with    Shortness of Breath     With minor exertion    Labs     NON Fasting     He is a 55 y.o. male who presents for evalution. Reviewed PmHx, RxHx, FmHx, SocHx, AllgHx and updated and dated in the chart. Patient Active Problem List    Diagnosis    PUD (peptic ulcer disease)    Syncope    Acute blood loss anemia    GI hemorrhage    Severe obesity ([facility] [address]    ADD (attention deficit disorder)    Insomnia    POEMS syndrome    Neuropathy associated with endocrine disorder ([facility] [address]       Review of Systems - negative except as listed above in the HPI    Objective:     Vitals:    08/18/21 1003   BP: 123/75   Pulse: 78   Resp: 20   Temp: 97.8 °F (36.6 °C)   TempSrc: Oral   SpO2: 99%   Weight: 277 lb (125.6 kg)   Height: 5' 11\" (1.803 m)         Assessment/ Plan:   Diagnoses and all orders for this visit:    1. Gastrointestinal hemorrhage with hematemesis  -CBC at ED was stable,  No need to repeat    2. Acute blood loss anemia  -repeat labs in one month           I have discussed the diagnosis with the patient and the intended plan as seen in the above orders. The patient understands and agrees with the plan. The patient has received an after-visit summary and questions were answered concerning future plans. Medication Side Effects and Warnings were discussed with patient  Patient Labs were reviewed and or requested:  Patient Past Records were reviewed and or requested    Nidia Delvalle M.D.     There are no Patient Instructions on file for this visit.

## 2021-09-20 ENCOUNTER — OFFICE VISIT (OUTPATIENT)
Dept: FAMILY MEDICINE CLINIC | Age: 56
End: 2021-09-20
Payer: COMMERCIAL

## 2021-09-20 VITALS
HEART RATE: 85 BPM | OXYGEN SATURATION: 98 % | DIASTOLIC BLOOD PRESSURE: 78 MMHG | RESPIRATION RATE: 22 BRPM | BODY MASS INDEX: 37.8 KG/M2 | WEIGHT: 270 LBS | TEMPERATURE: 97.5 F | HEIGHT: 71 IN | SYSTOLIC BLOOD PRESSURE: 114 MMHG

## 2021-09-20 DIAGNOSIS — G47.00 INSOMNIA: ICD-10-CM

## 2021-09-20 DIAGNOSIS — N52.9 ERECTILE DYSFUNCTION, UNSPECIFIED ERECTILE DYSFUNCTION TYPE: Primary | ICD-10-CM

## 2021-09-20 DIAGNOSIS — F98.8 ATTENTION DEFICIT DISORDER, UNSPECIFIED HYPERACTIVITY PRESENCE: ICD-10-CM

## 2021-09-20 PROCEDURE — 99213 OFFICE O/P EST LOW 20 MIN: CPT | Performed by: FAMILY MEDICINE

## 2021-09-20 RX ORDER — ZOLPIDEM TARTRATE 10 MG/1
10 TABLET ORAL
Qty: 90 TABLET | Refills: 1 | Status: SHIPPED | OUTPATIENT
Start: 2021-09-20 | End: 2022-01-20

## 2021-09-20 RX ORDER — PANTOPRAZOLE SODIUM 40 MG/1
40 TABLET, DELAYED RELEASE ORAL 2 TIMES DAILY
Qty: 180 TABLET | Refills: 3 | Status: SHIPPED | OUTPATIENT
Start: 2021-09-20 | End: 2022-06-23 | Stop reason: SDUPTHER

## 2021-09-20 RX ORDER — SILDENAFIL 100 MG/1
100 TABLET, FILM COATED ORAL AS NEEDED
Qty: 6 TABLET | Refills: 1 | Status: SHIPPED | OUTPATIENT
Start: 2021-09-20 | End: 2022-04-19

## 2021-09-20 RX ORDER — METHYLPHENIDATE HYDROCHLORIDE 40 MG/1
40 CAPSULE, EXTENDED RELEASE ORAL
Qty: 90 CAPSULE | Refills: 0 | Status: SHIPPED | OUTPATIENT
Start: 2021-09-20 | End: 2022-08-01 | Stop reason: SDUPTHER

## 2021-09-20 NOTE — PROGRESS NOTES
Patient here for 1 month f/u for anemia. Patient sees Dr. Maris Newton and in currently getting iron infusions. 1. Have you been to the ER, urgent care clinic since your last visit? Hospitalized since your last visit? No    2. Have you seen or consulted any other health care providers outside of the [address] [place] [address] since your last visit? Include any pap smears or colon screening. No              Chief Complaint   Patient presents with    Anemia     1 month f/u, seeing Dr. Maris Newton     He is a 55 y.o. male who presents for evalution. Reviewed PmHx, RxHx, FmHx, SocHx, AllgHx and updated and dated in the chart. Patient Active Problem List    Diagnosis    PUD (peptic ulcer disease)    Syncope    Acute blood loss anemia    GI hemorrhage    Severe obesity ([facility] [address]    ADD (attention deficit disorder)    Insomnia    POEMS syndrome    Neuropathy associated with endocrine disorder ([facility] [address]       Review of Systems - negative except as listed above in the HPI    Objective:     Vitals:    09/20/21 0752   BP: 114/78   Pulse: 85   Resp: 22   Temp: 97.5 °F (36.4 °C)   SpO2: 98%   Weight: 270 lb (122.5 kg)   Height: 5' 11\" (1.803 m)         Assessment/ Plan:   Diagnoses and all orders for this visit:    1. Erectile dysfunction, unspecified erectile dysfunction type  -     sildenafil citrate (Viagra) 100 mg tablet; Take 1 Tablet by mouth as needed for Erectile Dysfunction.  -add rx    2. Insomnia  -     zolpidem (AMBIEN) 10 mg tablet; Take 1 Tablet by mouth nightly as needed for Sleep. Ok to fill 11/10/2015    3. Attention deficit disorder, unspecified hyperactivity presence  -     methylphenidate HCl (Metadate CD) 40 mg BP30; Take 40 mg by mouth every morning. Max Daily Amount: 40 mg.  -90 days    Other orders  -     pantoprazole (PROTONIX) 40 mg tablet; Take 1 Tablet by mouth two (2) times a day. I have discussed the diagnosis with the patient and the intended plan as seen in the above orders.   The patient understands and agrees with the plan. The patient has received an after-visit summary and questions were answered concerning future plans. Medication Side Effects and Warnings were discussed with patient  Patient Labs were reviewed and or requested:  Patient Past Records were reviewed and or requested    Elmira Bledsoe M.D. There are no Patient Instructions on file for this visit.

## 2021-09-23 ENCOUNTER — DOCUMENTATION ONLY (OUTPATIENT)
Dept: FAMILY MEDICINE CLINIC | Age: 56
End: 2021-09-23

## 2021-09-23 NOTE — PROGRESS NOTES
PA for methylphenidate ER 40 submitted to [facility] via cover my meds,awaiting response.

## 2021-09-27 ENCOUNTER — DOCUMENTATION ONLY (OUTPATIENT)
Dept: FAMILY MEDICINE CLINIC | Age: 56
End: 2021-09-27

## 2022-01-16 ENCOUNTER — APPOINTMENT (OUTPATIENT)
Dept: GENERAL RADIOLOGY | Age: 57
DRG: 193 | End: 2022-01-16
Attending: STUDENT IN AN ORGANIZED HEALTH CARE EDUCATION/TRAINING PROGRAM
Payer: COMMERCIAL

## 2022-01-16 ENCOUNTER — APPOINTMENT (OUTPATIENT)
Dept: CT IMAGING | Age: 57
DRG: 193 | End: 2022-01-16
Attending: EMERGENCY MEDICINE
Payer: COMMERCIAL

## 2022-01-16 ENCOUNTER — HOSPITAL ENCOUNTER (INPATIENT)
Age: 57
LOS: 4 days | Discharge: HOME | DRG: 193 | End: 2022-01-20
Attending: EMERGENCY MEDICINE | Admitting: INTERNAL MEDICINE
Payer: COMMERCIAL

## 2022-01-16 DIAGNOSIS — J18.9 PNEUMONIA OF LEFT LOWER LOBE DUE TO INFECTIOUS ORGANISM: ICD-10-CM

## 2022-01-16 DIAGNOSIS — I21.4 NSTEMI (NON-ST ELEVATED MYOCARDIAL INFARCTION) (HCC): Primary | ICD-10-CM

## 2022-01-16 DIAGNOSIS — R41.0 DELIRIUM: ICD-10-CM

## 2022-01-16 LAB
ALBUMIN SERPL-MCNC: 2.8 G/DL (ref 3.5–5)
ALBUMIN/GLOB SERPL: 0.7 {RATIO} (ref 1.1–2.2)
ALP SERPL-CCNC: 96 U/L (ref 45–117)
ALT SERPL-CCNC: 58 U/L (ref 12–78)
ANION GAP SERPL CALC-SCNC: 3 MMOL/L (ref 5–15)
AST SERPL W P-5'-P-CCNC: 104 U/L (ref 15–37)
BASOPHILS # BLD: 0 K/UL (ref 0–0.1)
BASOPHILS NFR BLD: 0 % (ref 0–1)
BILIRUB SERPL-MCNC: 0.5 MG/DL (ref 0.2–1)
BUN SERPL-MCNC: 28 MG/DL (ref 6–20)
BUN/CREAT SERPL: 33 (ref 12–20)
CA-I BLD-MCNC: 8.7 MG/DL (ref 8.5–10.1)
CHLORIDE SERPL-SCNC: 102 MMOL/L (ref 97–108)
CO2 SERPL-SCNC: 32 MMOL/L (ref 21–32)
COVID-19 RAPID TEST, COVR: NOT DETECTED
CREAT SERPL-MCNC: 0.86 MG/DL (ref 0.7–1.3)
DIFFERENTIAL METHOD BLD: ABNORMAL
EOSINOPHIL # BLD: 0 K/UL (ref 0–0.4)
EOSINOPHIL NFR BLD: 0 % (ref 0–7)
ERYTHROCYTE [DISTWIDTH] IN BLOOD BY AUTOMATED COUNT: 19.5 % (ref 11.5–14.5)
GLOBULIN SER CALC-MCNC: 3.9 G/DL (ref 2–4)
GLUCOSE SERPL-MCNC: 159 MG/DL (ref 65–100)
HCT VFR BLD AUTO: 37.8 % (ref 36.6–50.3)
HGB BLD-MCNC: 11 G/DL (ref 12.1–17)
IMM GRANULOCYTES # BLD AUTO: 0.1 K/UL (ref 0–0.04)
IMM GRANULOCYTES NFR BLD AUTO: 1 % (ref 0–0.5)
LYMPHOCYTES # BLD: 0.7 K/UL (ref 0.8–3.5)
LYMPHOCYTES NFR BLD: 5 % (ref 12–49)
MCH RBC QN AUTO: 22.4 PG (ref 26–34)
MCHC RBC AUTO-ENTMCNC: 29.1 G/DL (ref 30–36.5)
MCV RBC AUTO: 77 FL (ref 80–99)
MONOCYTES # BLD: 1 K/UL (ref 0–1)
MONOCYTES NFR BLD: 7 % (ref 5–13)
NEUTS SEG # BLD: 12.9 K/UL (ref 1.8–8)
NEUTS SEG NFR BLD: 87 % (ref 32–75)
NRBC # BLD: 0 K/UL (ref 0–0.01)
NRBC BLD-RTO: 0 PER 100 WBC
PLATELET # BLD AUTO: 189 K/UL (ref 150–400)
PMV BLD AUTO: 11.5 FL (ref 8.9–12.9)
POTASSIUM SERPL-SCNC: 5.1 MMOL/L (ref 3.5–5.1)
PROT SERPL-MCNC: 6.7 G/DL (ref 6.4–8.2)
RBC # BLD AUTO: 4.91 M/UL (ref 4.1–5.7)
SARS-COV-2, COV2: NORMAL
SODIUM SERPL-SCNC: 137 MMOL/L (ref 136–145)
SPECIMEN SOURCE: NORMAL
TROPONIN-HIGH SENSITIVITY: 158 NG/L (ref 0–76)
TROPONIN-HIGH SENSITIVITY: 218 NG/L (ref 0–76)
WBC # BLD AUTO: 14.6 K/UL (ref 4.1–11.1)

## 2022-01-16 PROCEDURE — 85025 COMPLETE CBC W/AUTO DIFF WBC: CPT

## 2022-01-16 PROCEDURE — 74011000250 HC RX REV CODE- 250: Performed by: INTERNAL MEDICINE

## 2022-01-16 PROCEDURE — 96368 THER/DIAG CONCURRENT INF: CPT

## 2022-01-16 PROCEDURE — 96366 THER/PROPH/DIAG IV INF ADDON: CPT

## 2022-01-16 PROCEDURE — 93005 ELECTROCARDIOGRAM TRACING: CPT

## 2022-01-16 PROCEDURE — 74011250637 HC RX REV CODE- 250/637: Performed by: EMERGENCY MEDICINE

## 2022-01-16 PROCEDURE — 99285 EMERGENCY DEPT VISIT HI MDM: CPT

## 2022-01-16 PROCEDURE — 74011000258 HC RX REV CODE- 258: Performed by: EMERGENCY MEDICINE

## 2022-01-16 PROCEDURE — 36415 COLL VENOUS BLD VENIPUNCTURE: CPT

## 2022-01-16 PROCEDURE — 74011250636 HC RX REV CODE- 250/636: Performed by: INTERNAL MEDICINE

## 2022-01-16 PROCEDURE — U0005 INFEC AGEN DETEC AMPLI PROBE: HCPCS

## 2022-01-16 PROCEDURE — 96365 THER/PROPH/DIAG IV INF INIT: CPT

## 2022-01-16 PROCEDURE — 84484 ASSAY OF TROPONIN QUANT: CPT

## 2022-01-16 PROCEDURE — 80053 COMPREHEN METABOLIC PANEL: CPT

## 2022-01-16 PROCEDURE — 87635 SARS-COV-2 COVID-19 AMP PRB: CPT

## 2022-01-16 PROCEDURE — 74011250637 HC RX REV CODE- 250/637: Performed by: INTERNAL MEDICINE

## 2022-01-16 PROCEDURE — 70450 CT HEAD/BRAIN W/O DYE: CPT

## 2022-01-16 PROCEDURE — 74011250636 HC RX REV CODE- 250/636: Performed by: EMERGENCY MEDICINE

## 2022-01-16 PROCEDURE — 65270000029 HC RM PRIVATE

## 2022-01-16 PROCEDURE — 71045 X-RAY EXAM CHEST 1 VIEW: CPT

## 2022-01-16 RX ORDER — PANTOPRAZOLE SODIUM 40 MG/1
40 TABLET, DELAYED RELEASE ORAL
Status: DISCONTINUED | OUTPATIENT
Start: 2022-01-16 | End: 2022-01-19

## 2022-01-16 RX ORDER — ACETAMINOPHEN 650 MG/1
650 SUPPOSITORY RECTAL
Status: DISCONTINUED | OUTPATIENT
Start: 2022-01-16 | End: 2022-01-20 | Stop reason: HOSPADM

## 2022-01-16 RX ORDER — SODIUM CHLORIDE 0.9 % (FLUSH) 0.9 %
5-40 SYRINGE (ML) INJECTION EVERY 8 HOURS
Status: DISCONTINUED | OUTPATIENT
Start: 2022-01-16 | End: 2022-01-20 | Stop reason: HOSPADM

## 2022-01-16 RX ORDER — FONDAPARINUX SODIUM 2.5 MG/.5ML
2.5 INJECTION SUBCUTANEOUS DAILY
Status: DISCONTINUED | OUTPATIENT
Start: 2022-01-17 | End: 2022-01-20 | Stop reason: HOSPADM

## 2022-01-16 RX ORDER — SODIUM CHLORIDE 0.9 % (FLUSH) 0.9 %
5-40 SYRINGE (ML) INJECTION AS NEEDED
Status: DISCONTINUED | OUTPATIENT
Start: 2022-01-16 | End: 2022-01-20 | Stop reason: HOSPADM

## 2022-01-16 RX ORDER — PANTOPRAZOLE SODIUM 40 MG/1
40 TABLET, DELAYED RELEASE ORAL 2 TIMES DAILY
Status: DISCONTINUED | OUTPATIENT
Start: 2022-01-16 | End: 2022-01-16

## 2022-01-16 RX ORDER — GUAIFENESIN 100 MG/5ML
324 LIQUID (ML) ORAL
Status: COMPLETED | OUTPATIENT
Start: 2022-01-16 | End: 2022-01-16

## 2022-01-16 RX ORDER — ONDANSETRON 2 MG/ML
4 INJECTION INTRAMUSCULAR; INTRAVENOUS
Status: DISCONTINUED | OUTPATIENT
Start: 2022-01-16 | End: 2022-01-20 | Stop reason: HOSPADM

## 2022-01-16 RX ORDER — METHYLPHENIDATE HYDROCHLORIDE 10 MG/1
20 TABLET ORAL 2 TIMES DAILY
Status: DISCONTINUED | OUTPATIENT
Start: 2022-01-20 | End: 2022-01-20 | Stop reason: HOSPADM

## 2022-01-16 RX ORDER — GUAIFENESIN 100 MG/5ML
324 LIQUID (ML) ORAL DAILY
Status: DISCONTINUED | OUTPATIENT
Start: 2022-01-17 | End: 2022-01-16

## 2022-01-16 RX ORDER — ACETAMINOPHEN 325 MG/1
650 TABLET ORAL
Status: DISCONTINUED | OUTPATIENT
Start: 2022-01-16 | End: 2022-01-20 | Stop reason: HOSPADM

## 2022-01-16 RX ORDER — POLYETHYLENE GLYCOL 3350 17 G/17G
17 POWDER, FOR SOLUTION ORAL DAILY PRN
Status: DISCONTINUED | OUTPATIENT
Start: 2022-01-16 | End: 2022-01-20 | Stop reason: HOSPADM

## 2022-01-16 RX ORDER — ONDANSETRON 4 MG/1
4 TABLET, ORALLY DISINTEGRATING ORAL
Status: DISCONTINUED | OUTPATIENT
Start: 2022-01-16 | End: 2022-01-20 | Stop reason: HOSPADM

## 2022-01-16 RX ADMIN — VANCOMYCIN HYDROCHLORIDE 1250 MG: 1.25 INJECTION, POWDER, LYOPHILIZED, FOR SOLUTION INTRAVENOUS at 22:59

## 2022-01-16 RX ADMIN — ASPIRIN 81 MG CHEWABLE TABLET 324 MG: 81 TABLET CHEWABLE at 20:56

## 2022-01-16 RX ADMIN — SODIUM CHLORIDE, PRESERVATIVE FREE 10 ML: 5 INJECTION INTRAVENOUS at 23:08

## 2022-01-16 RX ADMIN — PANTOPRAZOLE SODIUM 40 MG: 40 TABLET, DELAYED RELEASE ORAL at 22:59

## 2022-01-16 RX ADMIN — PIPERACILLIN AND TAZOBACTAM 3.38 G: 3; .375 INJECTION, POWDER, LYOPHILIZED, FOR SOLUTION INTRAVENOUS at 21:40

## 2022-01-16 NOTE — Clinical Note
Status[de-identified] INPATIENT [101]   Type of Bed: Medical [8]   Cardiac Monitoring Required?: Yes   Inpatient Hospitalization Certified Necessary for the Following Reasons: 3.  Patient receiving treatment that can only be provided in an inpatient setting (further clarification in H&P documentation)   Admitting Diagnosis: NSTEMI (non-ST elevated myocardial infarction) [facility]) [[id number]]   Admitting Physician: Hermila Burns [[id number]]   Attending Physician: Hermila Burns [[id number]]   Estimated Length of Stay: 3-4 Midnights   Discharge Plan[de-identified] Home with Office Follow-up

## 2022-01-17 ENCOUNTER — APPOINTMENT (OUTPATIENT)
Dept: NON INVASIVE DIAGNOSTICS | Age: 57
DRG: 193 | End: 2022-01-17
Attending: INTERNAL MEDICINE
Payer: COMMERCIAL

## 2022-01-17 LAB
AMPHET UR QL SCN: NEGATIVE
ANION GAP SERPL CALC-SCNC: 2 MMOL/L (ref 5–15)
APPEARANCE UR: CLEAR
ARTERIAL PATENCY WRIST A: ABNORMAL
ATRIAL RATE: 62 BPM
ATRIAL RATE: 84 BPM
BACTERIA URNS QL MICRO: NEGATIVE /HPF
BARBITURATES UR QL SCN: NEGATIVE
BASE EXCESS BLDA CALC-SCNC: 8.5 MMOL/L (ref 0–2)
BDY SITE: ABNORMAL
BENZODIAZ UR QL: NEGATIVE
BILIRUB UR QL: NEGATIVE
BUN SERPL-MCNC: 26 MG/DL (ref 6–20)
BUN/CREAT SERPL: 37 (ref 12–20)
CA-I BLD-MCNC: 8.7 MG/DL (ref 8.5–10.1)
CALCULATED P AXIS, ECG09: 28 DEGREES
CALCULATED P AXIS, ECG09: 62 DEGREES
CALCULATED R AXIS, ECG10: 62 DEGREES
CALCULATED R AXIS, ECG10: 67 DEGREES
CALCULATED T AXIS, ECG11: 78 DEGREES
CALCULATED T AXIS, ECG11: 85 DEGREES
CANNABINOIDS UR QL SCN: NEGATIVE
CHLORIDE SERPL-SCNC: 101 MMOL/L (ref 97–108)
CO2 SERPL-SCNC: 34 MMOL/L (ref 21–32)
COCAINE UR QL SCN: POSITIVE
COLOR UR: ABNORMAL
CREAT SERPL-MCNC: 0.71 MG/DL (ref 0.7–1.3)
DIAGNOSIS, 93000: NORMAL
DIAGNOSIS, 93000: NORMAL
DRUG SCRN COMMENT,DRGCM: ABNORMAL
ECHO AO ROOT DIAM: 3.5 CM
ECHO AO ROOT INDEX: 1.46 CM/M2
ECHO AV AREA PEAK VELOCITY: 1.5 CM2
ECHO AV AREA VTI: 1.8 CM2
ECHO AV AREA/BSA PEAK VELOCITY: 0.6 CM2/M2
ECHO AV AREA/BSA VTI: 0.8 CM2/M2
ECHO AV MEAN GRADIENT: 13 MMHG
ECHO AV MEAN VELOCITY: 1.7 M/S
ECHO AV PEAK GRADIENT: 26 MMHG
ECHO AV PEAK VELOCITY: 2.6 M/S
ECHO AV VELOCITY RATIO: 0.5
ECHO AV VTI: 52.9 CM
ECHO EST RA PRESSURE: 15 MMHG
ECHO LA DIAMETER INDEX: 1.54 CM/M2
ECHO LA DIAMETER: 3.7 CM
ECHO LA TO AORTIC ROOT RATIO: 1.06
ECHO LV E' LATERAL VELOCITY: 12 CM/S
ECHO LV E' SEPTAL VELOCITY: 7 CM/S
ECHO LV EJECTION FRACTION BIPLANE: 65 % (ref 55–100)
ECHO LV FRACTIONAL SHORTENING: 35 % (ref 28–44)
ECHO LV INTERNAL DIMENSION DIASTOLE INDEX: 2.25 CM/M2
ECHO LV INTERNAL DIMENSION DIASTOLIC: 5.4 CM (ref 4.2–5.9)
ECHO LV INTERNAL DIMENSION SYSTOLIC INDEX: 1.46 CM/M2
ECHO LV INTERNAL DIMENSION SYSTOLIC: 3.5 CM
ECHO LV IVSD: 1.5 CM (ref 0.6–1)
ECHO LV MASS 2D: 264.4 G (ref 88–224)
ECHO LV MASS INDEX 2D: 110.2 G/M2 (ref 49–115)
ECHO LV POSTERIOR WALL DIASTOLIC: 0.9 CM (ref 0.6–1)
ECHO LV RELATIVE WALL THICKNESS RATIO: 0.33
ECHO LVOT AREA: 3.1 CM2
ECHO LVOT AV VTI INDEX: 0.56
ECHO LVOT DIAM: 2 CM
ECHO LVOT MEAN GRADIENT: 3 MMHG
ECHO LVOT PEAK GRADIENT: 6 MMHG
ECHO LVOT PEAK VELOCITY: 1.3 M/S
ECHO LVOT STROKE VOLUME INDEX: 38.6 ML/M2
ECHO LVOT SV: 92.6 ML
ECHO LVOT VTI: 29.5 CM
ECHO MV A VELOCITY: 1.23 M/S
ECHO MV E DECELERATION TIME (DT): 285 MS
ECHO MV E VELOCITY: 1.25 M/S
ECHO MV E/A RATIO: 1.02
ECHO MV E/E' LATERAL: 10.42
ECHO MV E/E' RATIO (AVERAGED): 14.14
ECHO MV E/E' SEPTAL: 17.86
ECHO MV REGURGITANT PEAK GRADIENT: 14 MMHG
ECHO MV REGURGITANT PEAK VELOCITY: 1.9 M/S
ECHO PULMONARY ARTERY END DIASTOLIC PRESSURE: 4 MMHG
ECHO PV MAX VELOCITY: 1 M/S
ECHO PV PEAK GRADIENT: 4 MMHG
ECHO PV REGURGITANT MAX VELOCITY: 1 M/S
ECHO PVEIN A DURATION: 74 MS
ECHO PVEIN A VELOCITY: 0.3 M/S
ECHO RIGHT VENTRICULAR SYSTOLIC PRESSURE (RVSP): 51 MMHG
ECHO RV INTERNAL DIMENSION: 4.7 CM
ECHO TV REGURGITANT MAX VELOCITY: 2.98 M/S
ECHO TV REGURGITANT PEAK GRADIENT: 36 MMHG
ERYTHROCYTE [DISTWIDTH] IN BLOOD BY AUTOMATED COUNT: 19.3 % (ref 11.5–14.5)
FIO2 ON VENT: 21 %
GLUCOSE SERPL-MCNC: 118 MG/DL (ref 65–100)
GLUCOSE UR STRIP.AUTO-MCNC: NEGATIVE MG/DL
HCO3 BLDA-SCNC: 32 MMOL/L (ref 22–26)
HCT VFR BLD AUTO: 35.4 % (ref 36.6–50.3)
HGB BLD-MCNC: 10.3 G/DL (ref 12.1–17)
HGB UR QL STRIP: ABNORMAL
KETONES UR QL STRIP.AUTO: NEGATIVE MG/DL
LEUKOCYTE ESTERASE UR QL STRIP.AUTO: NEGATIVE
MCH RBC QN AUTO: 22.2 PG (ref 26–34)
MCHC RBC AUTO-ENTMCNC: 29.1 G/DL (ref 30–36.5)
MCV RBC AUTO: 76.5 FL (ref 80–99)
METHADONE UR QL: NEGATIVE
MUCOUS THREADS URNS QL MICRO: ABNORMAL /LPF
NITRITE UR QL STRIP.AUTO: NEGATIVE
NRBC # BLD: 0 K/UL (ref 0–0.01)
NRBC BLD-RTO: 0 PER 100 WBC
OPIATES UR QL: NEGATIVE
P-R INTERVAL, ECG05: 156 MS
P-R INTERVAL, ECG05: 88 MS
PCO2 BLDA: 72 MMHG (ref 35–45)
PCP UR QL: NEGATIVE
PH BLDA: 7.32 [PH] (ref 7.35–7.45)
PH UR STRIP: 5 [PH] (ref 5–8)
PLATELET # BLD AUTO: 168 K/UL (ref 150–400)
PO2 BLDA: 137 MMHG (ref 75–100)
POTASSIUM SERPL-SCNC: 4.3 MMOL/L (ref 3.5–5.1)
PROCALCITONIN SERPL-MCNC: 0.3 NG/ML
PROT UR STRIP-MCNC: 100 MG/DL
Q-T INTERVAL, ECG07: 388 MS
Q-T INTERVAL, ECG07: 452 MS
QRS DURATION, ECG06: 114 MS
QRS DURATION, ECG06: 122 MS
QTC CALCULATION (BEZET), ECG08: 458 MS
QTC CALCULATION (BEZET), ECG08: 458 MS
RBC # BLD AUTO: 4.63 M/UL (ref 4.1–5.7)
RBC #/AREA URNS HPF: ABNORMAL /HPF (ref 0–5)
SAO2 % BLD: 100 %
SAO2% DEVICE SAO2% SENSOR NAME: ABNORMAL
SODIUM SERPL-SCNC: 137 MMOL/L (ref 136–145)
SP GR UR REFRACTOMETRY: >1.03 (ref 1–1.03)
TROPONIN-HIGH SENSITIVITY: 149 NG/L (ref 0–76)
UROBILINOGEN UR QL STRIP.AUTO: 0.1 EU/DL (ref 0.1–1)
VENTRICULAR RATE, ECG03: 62 BPM
VENTRICULAR RATE, ECG03: 84 BPM
WBC # BLD AUTO: 13.7 K/UL (ref 4.1–11.1)
WBC URNS QL MICRO: ABNORMAL /HPF (ref 0–4)

## 2022-01-17 PROCEDURE — 94640 AIRWAY INHALATION TREATMENT: CPT

## 2022-01-17 PROCEDURE — 81001 URINALYSIS AUTO W/SCOPE: CPT

## 2022-01-17 PROCEDURE — 80048 BASIC METABOLIC PNL TOTAL CA: CPT

## 2022-01-17 PROCEDURE — 74011000258 HC RX REV CODE- 258: Performed by: NURSE PRACTITIONER

## 2022-01-17 PROCEDURE — 74011250636 HC RX REV CODE- 250/636: Performed by: INTERNAL MEDICINE

## 2022-01-17 PROCEDURE — 80307 DRUG TEST PRSMV CHEM ANLYZR: CPT

## 2022-01-17 PROCEDURE — 87040 BLOOD CULTURE FOR BACTERIA: CPT

## 2022-01-17 PROCEDURE — 36600 WITHDRAWAL OF ARTERIAL BLOOD: CPT

## 2022-01-17 PROCEDURE — 36415 COLL VENOUS BLD VENIPUNCTURE: CPT

## 2022-01-17 PROCEDURE — 74011250637 HC RX REV CODE- 250/637: Performed by: INTERNAL MEDICINE

## 2022-01-17 PROCEDURE — 65270000029 HC RM PRIVATE

## 2022-01-17 PROCEDURE — 85027 COMPLETE CBC AUTOMATED: CPT

## 2022-01-17 PROCEDURE — 96372 THER/PROPH/DIAG INJ SC/IM: CPT

## 2022-01-17 PROCEDURE — 93306 TTE W/DOPPLER COMPLETE: CPT

## 2022-01-17 PROCEDURE — 84484 ASSAY OF TROPONIN QUANT: CPT

## 2022-01-17 PROCEDURE — 74011000250 HC RX REV CODE- 250: Performed by: INTERNAL MEDICINE

## 2022-01-17 PROCEDURE — 84145 PROCALCITONIN (PCT): CPT

## 2022-01-17 PROCEDURE — 93005 ELECTROCARDIOGRAM TRACING: CPT

## 2022-01-17 PROCEDURE — 74011250636 HC RX REV CODE- 250/636: Performed by: NURSE PRACTITIONER

## 2022-01-17 PROCEDURE — 82803 BLOOD GASES ANY COMBINATION: CPT

## 2022-01-17 RX ORDER — IPRATROPIUM BROMIDE AND ALBUTEROL SULFATE 2.5; .5 MG/3ML; MG/3ML
3 SOLUTION RESPIRATORY (INHALATION)
Status: DISCONTINUED | OUTPATIENT
Start: 2022-01-17 | End: 2022-01-20 | Stop reason: HOSPADM

## 2022-01-17 RX ORDER — ALBUTEROL SULFATE 90 UG/1
2 AEROSOL, METERED RESPIRATORY (INHALATION)
Status: DISCONTINUED | OUTPATIENT
Start: 2022-01-17 | End: 2022-01-20 | Stop reason: HOSPADM

## 2022-01-17 RX ORDER — HYDROXYZINE PAMOATE 25 MG/1
25 CAPSULE ORAL
Status: DISCONTINUED | OUTPATIENT
Start: 2022-01-17 | End: 2022-01-20 | Stop reason: HOSPADM

## 2022-01-17 RX ORDER — LANOLIN ALCOHOL/MO/W.PET/CERES
3 CREAM (GRAM) TOPICAL
Status: DISCONTINUED | OUTPATIENT
Start: 2022-01-17 | End: 2022-01-20 | Stop reason: HOSPADM

## 2022-01-17 RX ORDER — IPRATROPIUM BROMIDE AND ALBUTEROL SULFATE 2.5; .5 MG/3ML; MG/3ML
3 SOLUTION RESPIRATORY (INHALATION)
Status: DISCONTINUED | OUTPATIENT
Start: 2022-01-17 | End: 2022-01-18

## 2022-01-17 RX ADMIN — AZITHROMYCIN MONOHYDRATE 500 MG: 500 INJECTION, POWDER, LYOPHILIZED, FOR SOLUTION INTRAVENOUS at 18:34

## 2022-01-17 RX ADMIN — FONDAPARINUX SODIUM 2.5 MG: 2.5 INJECTION, SOLUTION SUBCUTANEOUS at 08:35

## 2022-01-17 RX ADMIN — ACETAMINOPHEN 650 MG: 325 TABLET ORAL at 23:54

## 2022-01-17 RX ADMIN — PIPERACILLIN AND TAZOBACTAM 3.38 G: 3; .375 INJECTION, POWDER, LYOPHILIZED, FOR SOLUTION INTRAVENOUS at 17:28

## 2022-01-17 RX ADMIN — IPRATROPIUM BROMIDE AND ALBUTEROL SULFATE 3 ML: .5; 2.5 SOLUTION RESPIRATORY (INHALATION) at 17:05

## 2022-01-17 RX ADMIN — PANTOPRAZOLE SODIUM 40 MG: 40 TABLET, DELAYED RELEASE ORAL at 08:35

## 2022-01-17 RX ADMIN — ACETAMINOPHEN 650 MG: 325 TABLET ORAL at 07:24

## 2022-01-17 NOTE — ACP (ADVANCE CARE PLANNING)
Advance Care Planning   Healthcare Decision Maker:       Primary Decision Maker: Sandrita Leonardo - [facility] - [phone]

## 2022-01-17 NOTE — PROGRESS NOTES
1/17/22. PCP is MILA Brody - had virtual visit a month ago. D/C Plan is home with wife Jasmin Ibarra @ [phone]) & she will transport pt home upon discharge. Pt uses no DME/no home health @ this time.

## 2022-01-17 NOTE — ED PROVIDER NOTES
EMERGENCY DEPARTMENT HISTORY AND PHYSICAL EXAM      Date: 1/16/2022  Patient Name: Leonie Kapadia      History of Presenting Illness     Chief Complaint   Patient presents with    Altered mental status       History Provided By: Patient    HPI: Leonie Kapadia, 55 y.o. male with a past medical history significant POEMS GI Bleed presents to the ED with cc of altered mental status. Patient states that 2 days ago he took a pill to help him sleep. He does not know what it was. However he had been normal when he woke up and he was able to drive back from [place] down here. He went to sleep and per EMS report his wife had trouble arousing him after 13 hours of sleeping. She found him to have a pulse and was breathing but EMS found him to be hypoxic to the 50s. They placed him on nonrebreather. On arrival here patient is awake and able to answer questions but still requiring oxygen. He denies any recent illness. Has no complaints of pain. He does follow sleep frequently throughout the interview    History is limited secondary to patient's clinical condition. There are no other complaints, changes, or physical findings at this time. PCP: Sherin Dumont MD    Current Facility-Administered Medications   Medication Dose Route Frequency Provider Last Rate Last Admin    piperacillin-tazobactam (ZOSYN) 3.375 g in 0.9% sodium chloride (MBP/ADV) 100 mL MBP  3.375 g IntraVENous ONCE Jazmyne Lynn MD        vancomycin (VANCOCIN) 1,250 mg in 0.9% sodium chloride 250 mL (VIAL-MATE)  1,250 mg IntraVENous Beny Curiel MD         Current Outpatient Medications   Medication Sig Dispense Refill    zolpidem (AMBIEN) 10 mg tablet Take 1 Tablet by mouth nightly as needed for Sleep. Ok to fill 11/10/2015 90 Tablet 1    pantoprazole (PROTONIX) 40 mg tablet Take 1 Tablet by mouth two (2) times a day. 180 Tablet 3    methylphenidate HCl (Metadate CD) 40 mg BP30 Take 40 mg by mouth every morning.  Max Daily Amount: 40 mg. 90 Capsule 0    sildenafil citrate (Viagra) 100 mg tablet Take 1 Tablet by mouth as needed for Erectile Dysfunction. 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 3/11/2010       Past Surgical History:  Past Surgical History:   Procedure Laterality Date    BIOPSY LIVER      CELL COUNT, CSF      x 2    COLONOSCOPY      Upper and Lower     FOOT/TOES SURGERY PROC UNLISTED      Remove Nerve from Left foot     HX GI      liver stent    HX ORTHOPAEDIC      MRI BRAIN W WO CONT      STENT INSERTION      Liver to Kidneys        Family History:  Family History   Family history unknown: Yes       Social History:  Social History     Tobacco Use    Smoking status: Never Smoker    Smokeless tobacco: Never Used   Substance Use Topics    Alcohol use: Yes    Drug use: No       Allergies: Allergies   Allergen Reactions    Heparin (Bovine) Other (comments)     Questionable per patient ,back lesions. Review of Systems     Review of Systems   Unable to perform ROS: Mental status change       Physical Exam     Physical Exam  Vitals and nursing note reviewed. Constitutional:       General: He is not in acute distress. Appearance: Normal appearance. He is toxic-appearing. HENT:      Head: Normocephalic and atraumatic. Eyes:      Extraocular Movements: Extraocular movements intact. Pupils: Pupils are equal, round, and reactive to light. Comments: Pupils are 3 and reactive   Cardiovascular:      Rate and Rhythm: Normal rate and regular rhythm. Pulses: Normal pulses. Pulmonary:      Effort: Pulmonary effort is normal.      Breath sounds: Normal breath sounds. Abdominal:      Tenderness:  There is no abdominal tenderness. Musculoskeletal:         General: No swelling or deformity. Comments: Scars on bilateral legs   Skin:     Coloration: Skin is not pale. Findings: No rash. Neurological:      General: No focal deficit present. Mental Status: He is lethargic. GCS: GCS eye subscore is 3. GCS verbal subscore is 5. GCS motor subscore is 6. Cranial Nerves: No facial asymmetry. Sensory: No sensory deficit. Motor: No weakness. Psychiatric:         Mood and Affect: Mood normal.         Behavior: Behavior normal.         Lab and Diagnostic Study Results     Labs -     Recent Results (from the past 12 hour(s))   CBC WITH AUTOMATED DIFF    Collection Time: 01/16/22  7:15 PM   Result Value Ref Range    WBC 14.6 (H) 4.1 - 11.1 K/uL    RBC 4.91 4.10 - 5.70 M/uL    HGB 11.0 (L) 12.1 - 17.0 g/dL    HCT 37.8 36.6 - 50.3 %    MCV 77.0 (L) 80.0 - 99.0 FL    MCH 22.4 (L) 26.0 - 34.0 PG    MCHC 29.1 (L) 30.0 - 36.5 g/dL    RDW 19.5 (H) 11.5 - 14.5 %    PLATELET 868 611 - 667 K/uL    MPV 11.5 8.9 - 12.9 FL    NRBC 0.0 0.0  WBC    ABSOLUTE NRBC 0.00 0.00 - 0.01 K/uL    NEUTROPHILS 87 (H) 32 - 75 %    LYMPHOCYTES 5 (L) 12 - 49 %    MONOCYTES 7 5 - 13 %    EOSINOPHILS 0 0 - 7 %    BASOPHILS 0 0 - 1 %    IMMATURE GRANULOCYTES 1 (H) 0 - 0.5 %    ABS. NEUTROPHILS 12.9 (H) 1.8 - 8.0 K/UL    ABS. LYMPHOCYTES 0.7 (L) 0.8 - 3.5 K/UL    ABS. MONOCYTES 1.0 0.0 - 1.0 K/UL    ABS. EOSINOPHILS 0.0 0.0 - 0.4 K/UL    ABS. BASOPHILS 0.0 0.0 - 0.1 K/UL    ABS. IMM.  GRANS. 0.1 (H) 0.00 - 0.04 K/UL    DF AUTOMATED     METABOLIC PANEL, COMPREHENSIVE    Collection Time: 01/16/22  7:15 PM   Result Value Ref Range    Sodium 137 136 - 145 mmol/L    Potassium 5.1 3.5 - 5.1 mmol/L    Chloride 102 97 - 108 mmol/L    CO2 32 21 - 32 mmol/L    Anion gap 3 (L) 5 - 15 mmol/L    Glucose 159 (H) 65 - 100 mg/dL    BUN 28 (H) 6 - 20 mg/dL    Creatinine 0.86 0.70 - 1.30 mg/dL    BUN/Creatinine ratio 33 (H) 12 - 20      GFR est AA >60 >60 ml/min/1.73m2    GFR est non-AA >60 >60 ml/min/1.73m2    Calcium 8.7 8.5 - 10.1 mg/dL    Bilirubin, total 0.5 0.2 - 1.0 mg/dL    AST (SGOT) 104 (H) 15 - 37 U/L    ALT (SGPT) 58 12 - 78 U/L    Alk. phosphatase 96 45 - 117 U/L    Protein, total 6.7 6.4 - 8.2 g/dL    Albumin 2.8 (L) 3.5 - 5.0 g/dL    Globulin 3.9 2.0 - 4.0 g/dL    A-G Ratio 0.7 (L) 1.1 - 2.2     TROPONIN-HIGH SENSITIVITY    Collection Time: 01/16/22  7:15 PM   Result Value Ref Range    Troponin-High Sensitivity 158 (HH) 0 - 76 ng/L   SARS-COV-2    Collection Time: 01/16/22  7:23 PM   Result Value Ref Range    SARS-CoV-2 Please find results under separate order     COVID-19 RAPID TEST    Collection Time: 01/16/22  8:15 PM   Result Value Ref Range    Specimen source Please find results under separate order      COVID-19 rapid test Not Detected Not Detected         Radiologic Studies -   [unfilled]  CT Results  (Last 48 hours)    None        CXR Results  (Last 48 hours)               01/16/22 1930  XR CHEST PORT Final result    Impression:  Suspected opacities in the left lung base are nonspecific but could represent   pneumonia in the appropriate clinical setting. Short interval follow-up is   recommended to ensure resolution and exclude other causes of opacification. Narrative:  Examination: XR CHEST PORT        History: low o2       Comparison: Chest radiograph 8/15/2021       FINDINGS:       Single frontal portable view of the chest. Dilation related to the degree of   patient rotation. Within exam limitations there are. Opacities in the left lung   base. No pneumothorax or significant pleural effusion. The cardiac silhouette is   prominent, likely accentuated by technique. No acute osseous abnormalities are   evident. Medical Decision Making and ED Course   - I am the first and primary provider for this patient AND AM THE PRIMARY PROVIDER OF RECORD.     - I reviewed the vital signs, available nursing notes, past medical history, past surgical history, family history and social history. - Initial assessment performed. The patients presenting problems have been discussed, and the staff are in agreement with the care plan formulated and outlined with them. I have encouraged them to ask questions as they arise throughout their visit. Vital Signs-Reviewed the patient's vital signs. Patient Vitals for the past 12 hrs:   Temp Pulse Resp BP SpO2   01/16/22 1905 98 °F (36.7 °C) 83 18 130/88 96 %   Records Reviewed: Nursing Notes      ED Course:       ED Course as of 01/16/22 2132   Katalina Ackerman Jan 16, 2022   210 60-year-old male presents for evaluation of altered mental status. Patient was sleeping at home and would like a trouble waking him up. He did not fall or hit his head. Here and has no complaints but does fall asleep frequently throughout the interview but is easily aroused. He is hypoxic requiring 3 L. Denies any shortness of breath or cough. Does admit to taking \"1 pill\" 2 days ago for sleeping that he does not want his wife to know about. He does not know what was in it. Patient diagnosis includes intoxication versus encephalitis versus metabolic disarray versus ICH. Getting labs including a CBC, CMP, troponin, COVID-19 test chest x-ray and head CT. Disposition as per clinical course. [LW]   2058 Hypoxic 50s, normal pulse. Trop 158. Likely demand ischemia. ASA given. CXR opacities, COVID pending. Hhx massive GI bleed, cannot give heparin: risk > benefit. [SS]   2121 EKG performed at 1913, read at [address]. Normal sinus rhythm with a rate of 84. Normal NE, normal QRS, normal QTC. Patient's troponin is positive. Spoke to Dr. Shayy Guo who recommended aspirin heparin and trending. They will see him in the morning. Unfortunately patient has an extensive heparin allergy. He is not a candidate for Lovenox as well. Spoke to Dr. Virginia Nieves about this and he recommended hematology consult. Patient is admitted to Dr. Erik Evans.   Vanc and zosyn ordered. [LW]      ED Course User Index  [LW] Isael Millard MD  [SS] Ruth Blackwood MD           Consultations:       Consultations: -  Hospitalist Consultant: Dr. Adalberto Blackburn: We have asked for emergent assistance with regard to this patient. We have discussed the patients HPI, ROS, PE and results this far. They will come and evaluate the patient for admission. and - Cardiology Consult: Dr. Venecia Holder. We have asked for emergent assistance with regard to this patient. We have discussed the patients HPI, ROS, PE and results this far. They will come and evaluate the patient for admission with or without emergent diagnostics and intervention. Procedures and Critical Care       CRITICAL CARE NOTE :  8:13 PM  Amount of Critical Care Time: 35 minutes    IMPENDING DETERIORATION -Respiratory, CNS  ASSOCIATED RISK FACTORS - Hypoxia  MANAGEMENT- Bedside Assessment  INTERPRETATION -  ECG and Cardiac Output Measures   INTERVENTIONS - Neurologic interventions  and Metobolic interventions  CASE REVIEW - Hospitalist/Intensivist and Medical Sub-Specialist  TREATMENT RESPONSE -Stable  PERFORMED BY - Self    NOTES   :  I have spent critical care time involved in lab review, consultations with specialist, family decision- making, bedside attention and documentation. This time excludes time spent in any separate billed procedures. During this entire length of time I was immediately available to the patient . Ryan Dupree MD        Disposition     Disposition: Admitted to Floor Medical Floor the case was discussed with the admitting physician Adalberto Blackburn. Diagnosis     Clinical Impression:   1. NSTEMI (non-ST elevated myocardial infarction) ([facility] [address]    2. Pneumonia of left lower lobe due to infectious organism    3. Delirium        Attestations:    Ryan Dupree MD    Please note that this dictation was completed with [employer], the [employer] voice recognition software.   Quite often unanticipated grammatical, syntax, homophones, and other interpretive errors are inadvertently transcribed by the computer software. Please disregard these errors. Please excuse any errors that have escaped final proofreading. Thank you.

## 2022-01-17 NOTE — H&P
History and Physical    Patient: Fam Pate MRN: [id number]  SSN: [id number]    YOB: 1965  Age: 55 y.o. Sex: male      Subjective:      Fam Pate is a 55 y.o. 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 Of note, he was driving when from [place] and arrived late last night since then,. He has been feeling fatigued and have been having trouble staying awake for about 12 hours continuously. On EMS arrival, he was found to be hypoxic respiratory 50s, and he was placed on nonrebreather. Subsequently, patient is more awake and able to answer question appropriately. He denies chest pain, palpitation, productive cough or shortness of breath. Also denies any GI symptoms. He denies any recent outdoor activities. In the ED, he was found to have elevated troponin. He previously had adverse reaction to heparin, and heparin drip was not initiated. Also has leukocytosis, WBC 14.6. Chest x-ray concerning for left lower lung pneumonia. CT head showed no acute intracranial abnormalities.     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 3/11/2010     Past Surgical History:   Procedure Laterality Date    BIOPSY LIVER      CELL COUNT, CSF      x 2    COLONOSCOPY      Upper and Lower     FOOT/TOES SURGERY PROC UNLISTED      Remove Nerve from Left foot     HX GI      liver stent    HX ORTHOPAEDIC      MRI BRAIN W WO CONT      STENT INSERTION      Liver to Kidneys       Family History   Family history unknown: Yes     Social History     Tobacco Use    Smoking status: Never Smoker    Smokeless tobacco: Never Used   Substance Use Topics    Alcohol use: Yes      Prior to Admission medications    Medication Sig Start Date End Date Taking? Authorizing Provider   zolpidem (AMBIEN) 10 mg tablet Take 1 Tablet by mouth nightly as needed for Sleep. Ok to fill 11/10/2015 9/20/21   Diane Goodrich MD   pantoprazole (PROTONIX) 40 mg tablet Take 1 Tablet by mouth two (2) times a day. 9/20/21   Diane Goodrich MD   methylphenidate HCl (Metadate CD) 40 mg BP30 Take 40 mg by mouth every morning. Max Daily Amount: 40 mg. 9/20/21   Diane Goodrich MD   sildenafil citrate (Viagra) 100 mg tablet Take 1 Tablet by mouth as needed for Erectile Dysfunction. 9/20/21   Diane Goodrich MD        Allergies   Allergen Reactions    Heparin (Bovine) Other (comments)     Questionable per patient ,back lesions. Review of Systems:  Constitutional: No fevers, No chills, positive for fatigue. Eyes: No visual disturbance  Ears, Nose, Mouth, Throat, and Face: No nasal congestion, No sore throat  Respiratory: No cough, No sputum, No wheezing, No SOB  Cardiovascular: No chest pain, No lower extremity edema, No Palpitations   Gastrointestinal: No nausea, No vomiting, No diarrhea, No constipation, No abdominal pain  Genitourinary: No frequency, No dysuria, No hematuria  Integument/Breast: No rash, No skin lesion(s), No dryness  Musculoskeletal: No arthralgias, No neck pain, No back pain  Neurological: No headaches, No dizziness, No confusion,  No seizures  Behavioral/Psychiatric: No anxiety, No depression      Objective:     Vitals:    01/16/22 1905 01/16/22 2145 01/16/22 2307   BP: 130/88 113/80 118/80   Pulse: 83 80 68   Resp: 18 18 13   Temp: 98 °F (36.7 °C)     SpO2: 96% 96% 98%   Weight: 122.5 kg (270 lb)     Height: 5' 11\" (1.803 m)          Physical Exam:  General: Fatigued looking, cooperative, no distress  Eye: conjunctivae/corneas clear. PERRL, EOM's intact. Throat and Neck: normal and no erythema or exudates noted. No mass   Lung: clear to auscultation bilaterally  Heart: regular rate and rhythm,   Abdomen: soft, non-tender.  Bowel sounds normal. No masses,  Extremities:  able to move all extremities normal, atraumatic  Skin: Normal.  Neurologic: AOx3. Cranial nerves 2-12 and sensation grossly intact. Psychiatric: non focal    Recent Results (from the past 24 hour(s))   CBC WITH AUTOMATED DIFF    Collection Time: 01/16/22  7:15 PM   Result Value Ref Range    WBC 14.6 (H) 4.1 - 11.1 K/uL    RBC 4.91 4.10 - 5.70 M/uL    HGB 11.0 (L) 12.1 - 17.0 g/dL    HCT 37.8 36.6 - 50.3 %    MCV 77.0 (L) 80.0 - 99.0 FL    MCH 22.4 (L) 26.0 - 34.0 PG    MCHC 29.1 (L) 30.0 - 36.5 g/dL    RDW 19.5 (H) 11.5 - 14.5 %    PLATELET 663 473 - 302 K/uL    MPV 11.5 8.9 - 12.9 FL    NRBC 0.0 0.0  WBC    ABSOLUTE NRBC 0.00 0.00 - 0.01 K/uL    NEUTROPHILS 87 (H) 32 - 75 %    LYMPHOCYTES 5 (L) 12 - 49 %    MONOCYTES 7 5 - 13 %    EOSINOPHILS 0 0 - 7 %    BASOPHILS 0 0 - 1 %    IMMATURE GRANULOCYTES 1 (H) 0 - 0.5 %    ABS. NEUTROPHILS 12.9 (H) 1.8 - 8.0 K/UL    ABS. LYMPHOCYTES 0.7 (L) 0.8 - 3.5 K/UL    ABS. MONOCYTES 1.0 0.0 - 1.0 K/UL    ABS. EOSINOPHILS 0.0 0.0 - 0.4 K/UL    ABS. BASOPHILS 0.0 0.0 - 0.1 K/UL    ABS. IMM. GRANS. 0.1 (H) 0.00 - 0.04 K/UL    DF AUTOMATED     METABOLIC PANEL, COMPREHENSIVE    Collection Time: 01/16/22  7:15 PM   Result Value Ref Range    Sodium 137 136 - 145 mmol/L    Potassium 5.1 3.5 - 5.1 mmol/L    Chloride 102 97 - 108 mmol/L    CO2 32 21 - 32 mmol/L    Anion gap 3 (L) 5 - 15 mmol/L    Glucose 159 (H) 65 - 100 mg/dL    BUN 28 (H) 6 - 20 mg/dL    Creatinine 0.86 0.70 - 1.30 mg/dL    BUN/Creatinine ratio 33 (H) 12 - 20      GFR est AA >60 >60 ml/min/1.73m2    GFR est non-AA >60 >60 ml/min/1.73m2    Calcium 8.7 8.5 - 10.1 mg/dL    Bilirubin, total 0.5 0.2 - 1.0 mg/dL    AST (SGOT) 104 (H) 15 - 37 U/L    ALT (SGPT) 58 12 - 78 U/L    Alk.  phosphatase 96 45 - 117 U/L    Protein, total 6.7 6.4 - 8.2 g/dL    Albumin 2.8 (L) 3.5 - 5.0 g/dL    Globulin 3.9 2.0 - 4.0 g/dL    A-G Ratio 0.7 (L) 1.1 - 2.2     TROPONIN-HIGH SENSITIVITY    Collection Time: 01/16/22  7:15 PM   Result Value Ref Range    Troponin-High Sensitivity 158 (HH) 0 - 76 ng/L   SARS-COV-2    Collection Time: 01/16/22  7:23 PM   Result Value Ref Range    SARS-CoV-2 Please find results under separate order     COVID-19 RAPID TEST    Collection Time: 01/16/22  8:15 PM   Result Value Ref Range    Specimen source Please find results under separate order      COVID-19 rapid test Not Detected Not Detected     TROPONIN-HIGH SENSITIVITY    Collection Time: 01/16/22  9:14 PM   Result Value Ref Range    Troponin-High Sensitivity 218 (HH) 0 - 76 ng/L       XR Results (maximum last 3): Results from Hospital Encounter encounter on 01/16/22    XR CHEST PORT    Narrative  Examination: XR CHEST PORT    History: low o2    Comparison: Chest radiograph 8/15/2021    FINDINGS:    Single frontal portable view of the chest. Dilation related to the degree of  patient rotation. Within exam limitations there are. Opacities in the left lung  base. No pneumothorax or significant pleural effusion. The cardiac silhouette is  prominent, likely accentuated by technique. No acute osseous abnormalities are  evident. Impression  Suspected opacities in the left lung base are nonspecific but could represent  pneumonia in the appropriate clinical setting. Short interval follow-up is  recommended to ensure resolution and exclude other causes of opacification. Results from [place] encounter on 08/15/21    XR CHEST PORT    Narrative  Chest, PA view. Comparison with 12/22/2011. The heart, mediastinum, and pulmonary vasculature appear within normal limits. No evidence of pulmonary edema, air space pneumonia, or pleural effusion. No  evidence of pneumothorax. Stent graft overlies the right upper abdomen, directed  towards the right atrium. Impression  No acute process. Stable appearance of TIPS.       Results from [place] encounter on 12/27/11    XR FLUOROSCOPY UNDER 60 MINUTES    Narrative  **Final Report**      ICD Codes / Adm. Diagnosis:    / LUMBAR STENOSIS  Examination:  CR FLUORO GUIDE UP TO 1 HR  - [id number] - Dec 27 2011  4:25PM  Accession No:  [id number]  Reason:  interop      REPORT:    Fluoroscopy time was provided. IMPRESSION:  Fluoroscopy time was provided. LMS        Signing/Reading Doctor: BSR  ([id number])  Approved: BSR  ([id number])  01/02/2012      CT Results (maximum last 3): Results from Hospital Encounter encounter on 01/16/22    CT HEAD WO CONT    Narrative  Study: Head CT without contrast.    Clinical indication: Altered mental status. Technique: Routine volume acquisition of the head was obtained without IV  contrast. Coronal and sagittal reformations were generated in soft tissue and  bone kernels. Dose reduction: All CT scans at this facility are performed using  dose reduction optimization techniques as appropriate to a performed exam  including the following: Automated exposure control, adjustments of the mA  and/or kV according to patient size, or use of iterative reconstruction  technique. Comparison: None available. Findings:    No evidence of acute intracranial hemorrhage, mass effect, midline shift or  abnormal extra-axial fluid collection. Ventricles and basal cisterns are  preserved and are symmetric. Gray-white matter differentiation is maintained. No evidence of skull fracture. Visualized paranasal sinuses and mastoid air  cells are clear. Globes and orbits are intact. Impression  No acute intracranial abnormality.       Results from [place] encounter on 08/05/21    CTA ABDOMEN PELV W CONT    Narrative  CTA abdomen and pelvis without and with intravenous contrast, 100 cc [id number],  3-D MIP images    Dose reduction: All CT scans at this facility are performed using dose reduction  optimization techniques as appropriate to a performed exam including the  following: Automated exposure control, adjustments of the mA and/or kV according  to patient size, or use of iterative reconstruction technique. INDICATION: GI bleed    FINDINGS:    No aneurysm or dissection of abdominal aorta. Celiac trunk, SMA and renal  arteries are patent. No contrast extravasation. Mild atherosclerosis. No airspace disease in the lung bases. Low density blood in the ventricles may  represent anemia. Liver, spleen and pancreas are unremarkable. Gallbladder is  contracted without pericholecystic stranding. TIPS. No urinary stone or  hydronephrosis on either side. 18 mm left renal lesion posteriorly is too small  to characterize, may represent a cyst. Additional bilateral renal lesions are  too small to characterize. No bowel obstruction. Normal appendix. No evidence of diverticulitis. No  abscess. No free intraperitoneal air. Prostate and seminal vesicles appear  unremarkable based on CT criteria. No acute fracture in the visualized bony  structures. Small lucency body of L3 may represent a hemangioma. Tiny umbilical  defect containing fat. Probable small right inguinal hernia containing fat. Impression  No contrast extravasation. No inflammatory changes or bowel obstruction. Renal  lesions are too small to characterize. Follow-up as clinically indicated. Please  see full report. MRI Results (maximum last 3): Results from [place] encounter on 11/30/11    MRI SPINE LUMBAR WITHOUT CONTRAST    Narrative  **Final Report**      ICD Codes / Adm. Diagnosis: 724.4  273.1 / THORACIC OR LUMBOSACRAL NEURIT  Examination:  MRI L SPINE  CON  - [id number] - Nov 30 2011 10:58AM  Accession No:  [id number]  Reason:  BACK PAIN LUMBAR      REPORT:  Technique: Multiplanar, multisequence images of the lumbar spine are  performed without IV gadolinium. FINDINGS: Exam is performed 11/30/2011. Comparison is made with 12/26/2008  lumbar spine radiographs. There is very minimal discogenic marrow endplate  change at M2-K0.  There are small anterior osteophytes at L1-L2. There is  disc desiccation at L4-S1, with slight loss of intervertebral disc space at  L5-S1. The conus is located at T12 and is unremarkable. There is no  ligamentous disruption and no paraspinous muscle edema. At L5-S1 there is a small annular fissure. There is a small to moderate disc  protrusion centered in the left paracentral zone. It displaces and  compresses the left S1 nerve root before it exits. There is mild central  canal stenosis. There are short pedicles. There is severe left neural  foraminal narrowing and moderate right neural foraminal narrowing. L4-L5 there is no disc protrusion. There are short pedicles with minimal  bilateral neural foraminal narrowing. T12-L4 demonstrate no disc protrusion, central canal stenosis, nor neural  foraminal narrowing. IMPRESSION:  1. Small to moderate disc protrusion at L5-S1 which displaces and compresses  the left S1 nerve root. 2. Mild central canal stenosis at L5-S1.  3. Bilateral neural foraminal narrowing at L4-S1 as above. Signing/Reading Doctor: William Gurrola ([id number])  Approved: William Gurrola ([id number])  11/30/2011      Nuclear Medicine Results (maximum last 3): No results found for this or any previous visit. US Results (maximum last 3): No results found for this or any previous visit. Assessment:   #Altered mental status  #Acute respiratory failure with hypoxia  #Community-acquired pneumonia  #NSTEMI      Plan:   #Altered mental status  And status improving however still appears lethargic. Likely secondary to hypoxia, secondary to pneumonia  -Continue supplemental oxygen  -Continue to monitor mental status. # Respiratory failure with hypoxia  Likely secondary to pneumonia and leading to altered mental status. #Community-acquired pneumonia  -Start Vanco and Zosyn empirically  -Obtain blood and sputum culture. #NSTEMI  -Start fondaparinux.  Avoid heparin drip.  -Consult cardiology      Signed By: Marisa Kendrick MD     January 17, 2022

## 2022-01-17 NOTE — PROGRESS NOTES
Reason for Admission:  NSTEMI                     RUR Score:  10%                   Plan for utilizing home health:  None @ this time/uses no DME. PCP: First and Last name:  Rei Gomez MD     Name of Practice:    Are you a current patient: Yes/No: Yes   Approximate date of last visit: Virtual visit a month ago. Can you participate in a virtual visit with your PCP: Yes                    Current Advanced Directive/Advance Care Plan: Full Code      Healthcare Decision Maker:              Primary Decision Maker: Mary Jane Dan - Spouse - [phone]                  Transition of Care Plan:  D/C Plan is home with wife & she will transport pt home upon discharge.

## 2022-01-17 NOTE — CONSULTS
Pulmonary and Critical Care Consult    Subjective:   Consult Note: 1/17/2022 @no control      Chief Complaint:   Chief Complaint   Patient presents with    Altered mental status        This patient has been seen and evaluated at the request of Steve Greer NP. Patient is a 55 y.o. 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 The patient says that he had nasal congestion and rhinorhea the last week and then took his son on a long drive. When he returned home on Sunday, he went to sleep. Later his wife was unable to arouse him and called the ambulance. According to records, EMS found him to be hypoxic with SpO2 in the 50s and he was placed on nonrebreather. He has regained his mentation and is able to answer questions appropriately now. Patient denies shortness of breath and wheezing. He does have a cough that rarely brings up a brown sputum.      He is on 4L NC with SpO2 of 95%. Chest x-ray noted opacities in the left lung base. CT head showed no acute intracranial abnormalities. Urine tox screen was positive for cocaine     Review of Systems:  A comprehensive review of systems was negative except for that written in the HPI.     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/11/2010     Past Surgical History:   Procedure Laterality Date    BIOPSY LIVER      CELL COUNT, CSF      x 2    COLONOSCOPY      Upper and Lower     FOOT/TOES SURGERY PROC UNLISTED      Remove Nerve from Left foot     HX GI      liver stent    HX ORTHOPAEDIC      MRI BRAIN W WO CONT      STENT INSERTION      Liver to Kidneys       Family History   Family history unknown: Yes     Social History     Tobacco Use    Smoking status: Never Smoker    Smokeless tobacco: Never Used   Substance Use Topics    Alcohol use: Yes      Current Facility-Administered Medications   Medication Dose Route Frequency Provider Last Rate Last Admin    piperacillin-tazobactam (ZOSYN) 3.375 g in 0.9% sodium chloride (MBP/ADV) 100 mL MBP  3.375 g IntraVENous Q8H Jorge Luis Dang, BEV        azithromycin (ZITHROMAX) 500 mg in 0.9% sodium chloride 250 mL (VIAL-MATE)  500 mg IntraVENous Q24H Jorge Luis Dang, NP        hydrOXYzine pamoate (VISTARIL) capsule 25 mg  25 mg Oral TID PRN Allyn Dang NP        . PHARMACY TO SUBSTITUTE PER PROTOCOL (Reordered from: methylphenidate HCl (Metadate CD) 40 mg BP30)    Per Protocol Cha, Sheril Carrel, MD        sodium chloride (NS) flush 5-40 mL  5-40 mL IntraVENous Q8H Cha, Sheril Carrel, MD   10 mL at 01/16/22 2308    sodium chloride (NS) flush 5-40 mL  5-40 mL IntraVENous PRN Cha, Sheril Carrel, MD        acetaminophen (TYLENOL) tablet 650 mg  650 mg Oral Q6H PRN Marie Mcduffie MD   650 mg at 01/17/22 1981    Or    acetaminophen (TYLENOL) suppository 650 mg  650 mg Rectal Q6H PRN Marie Mcduffie MD        polyethylene glycol (MIRALAX) packet 17 g  17 g Oral DAILY PRN Cha, Sheril Carrel, MD        ondansetron (ZOFRAN ODT) tablet 4 mg  4 mg Oral Q8H PRN Cha, Sheril Carrel, MD        Or    ondansetron (ZOFRAN) injection 4 mg  4 mg IntraVENous Q6H PRN Marie Mcduffie MD        fondaparinux (ARIXTRA) injection 2.5 mg  2.5 mg SubCUTAneous DAILY Cha, Sheril Carrel, MD   2.5 mg at 01/17/22 0835    pantoprazole (PROTONIX) tablet 40 mg  40 mg Oral ACB&D Marie Mcduffie MD   40 mg at 01/17/22 9760     Current Outpatient Medications   Medication Sig Dispense Refill    zolpidem (AMBIEN) 10 mg tablet Take 1 Tablet by mouth nightly as needed for Sleep. Ok to fill 11/10/2015 90 Tablet 1    pantoprazole (PROTONIX) 40 mg tablet Take 1 Tablet by mouth two (2) times a day. 180 Tablet 3    methylphenidate HCl (Metadate CD) 40 mg BP30 Take 40 mg by mouth every morning. Max Daily Amount: 40 mg. 90 Capsule 0    sildenafil citrate (Viagra) 100 mg tablet Take 1 Tablet by mouth as needed for Erectile Dysfunction. 6 Tablet 1          Allergies   Allergen Reactions    Heparin (Bovine) Other (comments)     Questionable per patient ,back lesions. Objective:     Blood pressure (!) 142/83, pulse 70, temperature 98 °F (36.7 °C), resp. rate 15, height 5' 11\" (1.803 m), weight 122.5 kg (270 lb), SpO2 94 %. Temp (24hrs), Av °F (36.7 °C), Min:98 °F (36.7 °C), Max:98 °F (36.7 °C)      Intake and Output:  Current Shift: No intake/output data recorded. Last 3 Shifts: 01/15 1901 -  0700  In: 350 [I.V.:350]  Out: -     Intake/Output Summary (Last 24 hours) at 2022 1600  Last data filed at 2022 0140  Gross per 24 hour   Intake 350 ml   Output --   Net 350 ml        Physical Exam:     General: Lying in bed comfortably, no acute distress on 5 L nasal cannula oxygen  Eye: Reactive, symmetric  Throat and Neck: Supple  Lung: Reduced air entry bilaterally with prolonged exhalation but no wheezing. Crackles in left mid and lower lung zone. Heart: S1+S2. No murmurs  Abdomen: soft, non-tender. Bowel sounds normal. No masses; obese  Extremities: No edema  : Not done  Skin: No cyanosis  Neurologic: A & O x3.   Grossly nonfocal  Psychiatric: Appropriate affect; coherent      Lab/Data Review:    Recent Results (from the past 24 hour(s))   EKG, 12 LEAD, INITIAL    Collection Time: 22  7:13 PM   Result Value Ref Range    Ventricular Rate 84 BPM    Atrial Rate 84 BPM    P-R Interval 156 ms    QRS Duration 114 ms    Q-T Interval 388 ms    QTC Calculation (Bezet) 458 ms    Calculated P Axis 62 degrees    Calculated R Axis 62 degrees Calculated T Axis 78 degrees    Diagnosis       Normal sinus rhythm  Normal ECG  When compared with ECG of 15-AUG-2021 15:26,  No significant change was found  Confirmed by FABRIZIO FLYNN, Ev Zamarripa ([id number]) on 1/17/2022 9:32:39 AM     CBC WITH AUTOMATED DIFF    Collection Time: 01/16/22  7:15 PM   Result Value Ref Range    WBC 14.6 (H) 4.1 - 11.1 K/uL    RBC 4.91 4.10 - 5.70 M/uL    HGB 11.0 (L) 12.1 - 17.0 g/dL    HCT 37.8 36.6 - 50.3 %    MCV 77.0 (L) 80.0 - 99.0 FL    MCH 22.4 (L) 26.0 - 34.0 PG    MCHC 29.1 (L) 30.0 - 36.5 g/dL    RDW 19.5 (H) 11.5 - 14.5 %    PLATELET 112 906 - 739 K/uL    MPV 11.5 8.9 - 12.9 FL    NRBC 0.0 0.0  WBC    ABSOLUTE NRBC 0.00 0.00 - 0.01 K/uL    NEUTROPHILS 87 (H) 32 - 75 %    LYMPHOCYTES 5 (L) 12 - 49 %    MONOCYTES 7 5 - 13 %    EOSINOPHILS 0 0 - 7 %    BASOPHILS 0 0 - 1 %    IMMATURE GRANULOCYTES 1 (H) 0 - 0.5 %    ABS. NEUTROPHILS 12.9 (H) 1.8 - 8.0 K/UL    ABS. LYMPHOCYTES 0.7 (L) 0.8 - 3.5 K/UL    ABS. MONOCYTES 1.0 0.0 - 1.0 K/UL    ABS. EOSINOPHILS 0.0 0.0 - 0.4 K/UL    ABS. BASOPHILS 0.0 0.0 - 0.1 K/UL    ABS. IMM. GRANS. 0.1 (H) 0.00 - 0.04 K/UL    DF AUTOMATED     METABOLIC PANEL, COMPREHENSIVE    Collection Time: 01/16/22  7:15 PM   Result Value Ref Range    Sodium 137 136 - 145 mmol/L    Potassium 5.1 3.5 - 5.1 mmol/L    Chloride 102 97 - 108 mmol/L    CO2 32 21 - 32 mmol/L    Anion gap 3 (L) 5 - 15 mmol/L    Glucose 159 (H) 65 - 100 mg/dL    BUN 28 (H) 6 - 20 mg/dL    Creatinine 0.86 0.70 - 1.30 mg/dL    BUN/Creatinine ratio 33 (H) 12 - 20      GFR est AA >60 >60 ml/min/1.73m2    GFR est non-AA >60 >60 ml/min/1.73m2    Calcium 8.7 8.5 - 10.1 mg/dL    Bilirubin, total 0.5 0.2 - 1.0 mg/dL    AST (SGOT) 104 (H) 15 - 37 U/L    ALT (SGPT) 58 12 - 78 U/L    Alk.  phosphatase 96 45 - 117 U/L    Protein, total 6.7 6.4 - 8.2 g/dL    Albumin 2.8 (L) 3.5 - 5.0 g/dL    Globulin 3.9 2.0 - 4.0 g/dL    A-G Ratio 0.7 (L) 1.1 - 2.2     TROPONIN-HIGH SENSITIVITY    Collection Time: 01/16/22 7:15 PM   Result Value Ref Range    Troponin-High Sensitivity 158 (HH) 0 - 76 ng/L   SARS-COV-2    Collection Time: 01/16/22  7:23 PM   Result Value Ref Range    SARS-CoV-2 Please find results under separate order     COVID-19 RAPID TEST    Collection Time: 01/16/22  8:15 PM   Result Value Ref Range    Specimen source Please find results under separate order      COVID-19 rapid test Not Detected Not Detected     TROPONIN-HIGH SENSITIVITY    Collection Time: 01/16/22  9:14 PM   Result Value Ref Range    Troponin-High Sensitivity 218 (HH) 0 - 76 ng/L   PROCALCITONIN    Collection Time: 01/17/22  1:30 AM   Result Value Ref Range    Procalcitonin 0.30 (H) 0 ng/mL   URINALYSIS W/MICROSCOPIC    Collection Time: 01/17/22  3:29 AM   Result Value Ref Range    Color Yellow/Straw      Appearance Clear Clear      Specific gravity >1.030 (H) 1.003 - 1.030    pH (UA) 5.0 5.0 - 8.0      Protein 100 (A) Negative mg/dL    Glucose Negative Negative mg/dL    Ketone Negative Negative mg/dL    Bilirubin Negative Negative      Blood Small (A) Negative      Urobilinogen 0.1 0.1 - 1.0 EU/dL    Nitrites Negative Negative      Leukocyte Esterase Negative Negative      WBC 0-5 0 - 4 /hpf    RBC 5-10 0 - 5 /hpf    Bacteria Negative Negative /hpf    Mucus Trace /lpf   DRUG SCREEN, URINE    Collection Time: 01/17/22  3:29 AM   Result Value Ref Range    AMPHETAMINES Negative Negative      BARBITURATES Negative Negative      BENZODIAZEPINES Negative Negative      COCAINE Positive (A) Negative      METHADONE Negative Negative      OPIATES Negative Negative      PCP(PHENCYCLIDINE) Negative Negative      THC (TH-CANNABINOL) Negative Negative      Drug screen comment        This test is a screen for drugs of abuse in a medical setting only (i.e., they are unconfirmed results and as such must not be used for non-medical purposes, e.g.,employment testing, legal testing).  Due to its inherent nature, false positive (FP) and false negative (FN) results may be obtained. Therefore, if necessary for medical care, recommend confirmation of positive findings by GC/MS. METABOLIC PANEL, BASIC    Collection Time: 01/17/22  4:03 AM   Result Value Ref Range    Sodium 137 136 - 145 mmol/L    Potassium 4.3 3.5 - 5.1 mmol/L    Chloride 101 97 - 108 mmol/L    CO2 34 (H) 21 - 32 mmol/L    Anion gap 2 (L) 5 - 15 mmol/L    Glucose 118 (H) 65 - 100 mg/dL    BUN 26 (H) 6 - 20 mg/dL    Creatinine 0.71 0.70 - 1.30 mg/dL    BUN/Creatinine ratio 37 (H) 12 - 20      GFR est AA >60 >60 ml/min/1.73m2    GFR est non-AA >60 >60 ml/min/1.73m2    Calcium 8.7 8.5 - 10.1 mg/dL   CBC W/O DIFF    Collection Time: 01/17/22  4:03 AM   Result Value Ref Range    WBC 13.7 (H) 4.1 - 11.1 K/uL    RBC 4.63 4.10 - 5.70 M/uL    HGB 10.3 (L) 12.1 - 17.0 g/dL    HCT 35.4 (L) 36.6 - 50.3 %    MCV 76.5 (L) 80.0 - 99.0 FL    MCH 22.2 (L) 26.0 - 34.0 PG    MCHC 29.1 (L) 30.0 - 36.5 g/dL    RDW 19.3 (H) 11.5 - 14.5 %    PLATELET 104 488 - 563 K/uL    NRBC 0.0 0.0  WBC    ABSOLUTE NRBC 0.00 0.00 - 0.01 K/uL       CT HEAD WO CONT   Final Result   No acute intracranial abnormality. XR CHEST PORT   Final Result   Suspected opacities in the left lung base are nonspecific but could represent   pneumonia in the appropriate clinical setting. Short interval follow-up is   recommended to ensure resolution and exclude other causes of opacification. CT Results  (Last 48 hours)               01/16/22 2140  CT HEAD WO CONT Final result    Impression:  No acute intracranial abnormality. Narrative:  Study: Head CT without contrast.       Clinical indication: Altered mental status. Technique: Routine volume acquisition of the head was obtained without IV   contrast. Coronal and sagittal reformations were generated in soft tissue and   bone kernels.  Dose reduction: All CT scans at this facility are performed using   dose reduction optimization techniques as appropriate to a performed exam including the following: Automated exposure control, adjustments of the mA   and/or kV according to patient size, or use of iterative reconstruction   technique. Comparison: None available. Findings:        No evidence of acute intracranial hemorrhage, mass effect, midline shift or   abnormal extra-axial fluid collection. Ventricles and basal cisterns are   preserved and are symmetric. Gray-white matter differentiation is maintained. No evidence of skull fracture. Visualized paranasal sinuses and mastoid air   cells are clear. Globes and orbits are intact. Assessment:     1. Acute respiratory failure with hypoxia  2. Left lower lobe pneumonia  3. Altered mental status/hypersomnia  4. Cocaine abuse  5. POEMS syndrome  6. Snoring/apnea  7.   Asthma    Plan:     Patient seen in the ED  Being admitted to the hospital  Will be watched here closely    Currently on 4 L nasal cannula oxygen  Will use supplemental oxygen as needed to keep saturation above 92%    Patient somewhat sleepy with hypersomnia and altered mental status  Patient's bicarb is 34 and he has features of sleep apnea which could be indicative of obesity hypoventilation syndrome  Will do ABG to rule out underlying hypercapnia  Patient has significant hypercapnia may require BiPAP    Left lower lobe pneumonia on chest x-ray  Start broad-spectrum antibiotic Zosyn and azithromycin  Cultures to be sent  Further changes in antibiotics based on clinical response and culture results    Patient has urine tox positive for cocaine  Denies any cocaine use  Monitor neurologically    Continue nebulizers for asthma    DVT and GI prophylaxis    Questions of patient were answered at bedside in detail  Case discussed in detail with RN, RT, and care team  Thank you for involving me in the care of this patient  I will follow with you closely during hospitalization    Time spent more than 45 minutes in direct patient care with no overlap reviewing results and records, decision making, and answering questions.       Camilo Field MD  [facility]  1/17/2022  4:00 PM

## 2022-01-17 NOTE — ED TRIAGE NOTES
Patient arrived via EMS. Patients wife called for patient not able to wake up, he had just done an over night trip and has been sleeping for 13 hours. Upon EMS arrival pt o2 was in the 50's, placed on non-rebreather and improved to 100% and now mentation at baseline.

## 2022-01-17 NOTE — PROGRESS NOTES
Hospitalist Progress Note    Subjective:   Daily Progress Note: 1/17/2022 4:40 PM    Hospital Course:  55 y.o. 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 Of note, he was driving when from [place] and arrived late last night since then,. He has been feeling fatigued and have been having trouble staying awake for about 12 hours continuously. On EMS arrival, he was found to be hypoxic respiratory 50s, and he was placed on nonrebreather. Subsequently, patient is more awake and able to answer question appropriately. He denies chest pain, palpitation, productive cough or shortness of breath. Subjective: Pt seen in the ED, wife at bedside. He complains of dyspnea. He denies substance abuse, denies cigarette smoking. Current Facility-Administered Medications   Medication Dose Route Frequency    piperacillin-tazobactam (ZOSYN) 3.375 g in 0.9% sodium chloride (MBP/ADV) 100 mL MBP  3.375 g IntraVENous Q8H    azithromycin (ZITHROMAX) 500 mg in 0.9% sodium chloride 250 mL (VIAL-MATE)  500 mg IntraVENous Q24H    hydrOXYzine pamoate (VISTARIL) capsule 25 mg  25 mg Oral TID PRN    albuterol-ipratropium (DUO-NEB) 2.5 MG-0.5 MG/3 ML  3 mL Nebulization QID RT    . PHARMACY TO SUBSTITUTE PER PROTOCOL (Reordered from: methylphenidate HCl (Metadate CD) 40 mg BP30)    Per Protocol    sodium chloride (NS) flush 5-40 mL  5-40 mL IntraVENous Q8H    sodium chloride (NS) flush 5-40 mL  5-40 mL IntraVENous PRN    acetaminophen (TYLENOL) tablet 650 mg  650 mg Oral Q6H PRN    Or    acetaminophen (TYLENOL) suppository 650 mg  650 mg Rectal Q6H PRN    polyethylene glycol (MIRALAX) packet 17 g  17 g Oral DAILY PRN    ondansetron (ZOFRAN ODT) tablet 4 mg  4 mg Oral Q8H PRN    Or    ondansetron (ZOFRAN) injection 4 mg  4 mg IntraVENous Q6H PRN    fondaparinux (ARIXTRA) injection 2.5 mg  2.5 mg SubCUTAneous DAILY    pantoprazole (PROTONIX) tablet 40 mg  40 mg Oral ACB&D     Current Outpatient Medications   Medication Sig    zolpidem (AMBIEN) 10 mg tablet Take 1 Tablet by mouth nightly as needed for Sleep. Ok to fill 11/10/2015    pantoprazole (PROTONIX) 40 mg tablet Take 1 Tablet by mouth two (2) times a day.  methylphenidate HCl (Metadate CD) 40 mg BP30 Take 40 mg by mouth every morning. Max Daily Amount: 40 mg.    sildenafil citrate (Viagra) 100 mg tablet Take 1 Tablet by mouth as needed for Erectile Dysfunction. Review of Systems:    Review of Systems   Constitutional: Negative for chills and fever. HENT: Negative for congestion and sore throat. Respiratory: Positive for cough, sputum production and shortness of breath. Cardiovascular: Negative for chest pain and palpitations. Gastrointestinal: Negative for heartburn, nausea and vomiting. Neurological: Negative for dizziness and headaches. Objective:     Visit Vitals  BP (!) 142/83   Pulse 70   Temp 98 °F (36.7 °C)   Resp 15   Ht 5' 11\" (1.803 m)   Wt 122.5 kg (270 lb)   SpO2 94%   BMI 37.66 kg/m²    O2 Flow Rate (L/min): 4 l/min O2 Device: Nasal cannula    Temp (24hrs), Av °F (36.7 °C), Min:98 °F (36.7 °C), Max:98 °F (36.7 °C)      No intake/output data recorded. 01/15 1901 -  0700  In: 350 [I.V.:350]  Out: -     PHYSICAL EXAM:    Physical Exam  Constitutional:       General: He is not in acute distress. Appearance: He is obese. Cardiovascular:      Rate and Rhythm: Normal rate and regular rhythm. Pulses: Normal pulses. Heart sounds: Normal heart sounds. Pulmonary:      Comments: Diminished in bases, scattered coarse sounds  Abdominal:      General: Bowel sounds are normal.      Palpations: Abdomen is soft. Musculoskeletal:         General: Normal range of motion. Skin:     General: Skin is warm and dry. Neurological:      Mental Status: He is oriented to person, place, and time.             Data Review    Recent Results (from the past 24 hour(s))   EKG, 12 LEAD, INITIAL    Collection Time: 01/16/22  7:13 PM   Result Value Ref Range    Ventricular Rate 84 BPM    Atrial Rate 84 BPM    P-R Interval 156 ms    QRS Duration 114 ms    Q-T Interval 388 ms    QTC Calculation (Bezet) 458 ms    Calculated P Axis 62 degrees    Calculated R Axis 62 degrees    Calculated T Axis 78 degrees    Diagnosis       Normal sinus rhythm  Normal ECG  When compared with ECG of 15-AUG-2021 15:26,  No significant change was found  Confirmed by FABRIZIO FLYNN, Christiano Navarro ([id number]) on 1/17/2022 9:32:39 AM     CBC WITH AUTOMATED DIFF    Collection Time: 01/16/22  7:15 PM   Result Value Ref Range    WBC 14.6 (H) 4.1 - 11.1 K/uL    RBC 4.91 4.10 - 5.70 M/uL    HGB 11.0 (L) 12.1 - 17.0 g/dL    HCT 37.8 36.6 - 50.3 %    MCV 77.0 (L) 80.0 - 99.0 FL    MCH 22.4 (L) 26.0 - 34.0 PG    MCHC 29.1 (L) 30.0 - 36.5 g/dL    RDW 19.5 (H) 11.5 - 14.5 %    PLATELET 276 832 - 435 K/uL    MPV 11.5 8.9 - 12.9 FL    NRBC 0.0 0.0  WBC    ABSOLUTE NRBC 0.00 0.00 - 0.01 K/uL    NEUTROPHILS 87 (H) 32 - 75 %    LYMPHOCYTES 5 (L) 12 - 49 %    MONOCYTES 7 5 - 13 %    EOSINOPHILS 0 0 - 7 %    BASOPHILS 0 0 - 1 %    IMMATURE GRANULOCYTES 1 (H) 0 - 0.5 %    ABS. NEUTROPHILS 12.9 (H) 1.8 - 8.0 K/UL    ABS. LYMPHOCYTES 0.7 (L) 0.8 - 3.5 K/UL    ABS. MONOCYTES 1.0 0.0 - 1.0 K/UL    ABS. EOSINOPHILS 0.0 0.0 - 0.4 K/UL    ABS. BASOPHILS 0.0 0.0 - 0.1 K/UL    ABS. IMM.  GRANS. 0.1 (H) 0.00 - 0.04 K/UL    DF AUTOMATED     METABOLIC PANEL, COMPREHENSIVE    Collection Time: 01/16/22  7:15 PM   Result Value Ref Range    Sodium 137 136 - 145 mmol/L    Potassium 5.1 3.5 - 5.1 mmol/L    Chloride 102 97 - 108 mmol/L    CO2 32 21 - 32 mmol/L    Anion gap 3 (L) 5 - 15 mmol/L    Glucose 159 (H) 65 - 100 mg/dL    BUN 28 (H) 6 - 20 mg/dL    Creatinine 0.86 0.70 - 1.30 mg/dL    BUN/Creatinine ratio 33 (H) 12 - 20      GFR est AA >60 >60 ml/min/1.73m2    GFR est non-AA >60 >60 ml/min/1.73m2    Calcium 8.7 8.5 - 10.1 mg/dL    Bilirubin, total 0.5 0.2 - 1.0 mg/dL    AST (SGOT) 104 (H) 15 - 37 U/L    ALT (SGPT) 58 12 - 78 U/L    Alk.  phosphatase 96 45 - 117 U/L    Protein, total 6.7 6.4 - 8.2 g/dL    Albumin 2.8 (L) 3.5 - 5.0 g/dL    Globulin 3.9 2.0 - 4.0 g/dL    A-G Ratio 0.7 (L) 1.1 - 2.2     TROPONIN-HIGH SENSITIVITY    Collection Time: 01/16/22  7:15 PM   Result Value Ref Range    Troponin-High Sensitivity 158 (HH) 0 - 76 ng/L   SARS-COV-2    Collection Time: 01/16/22  7:23 PM   Result Value Ref Range    SARS-CoV-2 Please find results under separate order     COVID-19 RAPID TEST    Collection Time: 01/16/22  8:15 PM   Result Value Ref Range    Specimen source Please find results under separate order      COVID-19 rapid test Not Detected Not Detected     TROPONIN-HIGH SENSITIVITY    Collection Time: 01/16/22  9:14 PM   Result Value Ref Range    Troponin-High Sensitivity 218 (HH) 0 - 76 ng/L   PROCALCITONIN    Collection Time: 01/17/22  1:30 AM   Result Value Ref Range    Procalcitonin 0.30 (H) 0 ng/mL   URINALYSIS W/MICROSCOPIC    Collection Time: 01/17/22  3:29 AM   Result Value Ref Range    Color Yellow/Straw      Appearance Clear Clear      Specific gravity >1.030 (H) 1.003 - 1.030    pH (UA) 5.0 5.0 - 8.0      Protein 100 (A) Negative mg/dL    Glucose Negative Negative mg/dL    Ketone Negative Negative mg/dL    Bilirubin Negative Negative      Blood Small (A) Negative      Urobilinogen 0.1 0.1 - 1.0 EU/dL    Nitrites Negative Negative      Leukocyte Esterase Negative Negative      WBC 0-5 0 - 4 /hpf    RBC 5-10 0 - 5 /hpf    Bacteria Negative Negative /hpf    Mucus Trace /lpf   DRUG SCREEN, URINE    Collection Time: 01/17/22  3:29 AM   Result Value Ref Range    AMPHETAMINES Negative Negative      BARBITURATES Negative Negative      BENZODIAZEPINES Negative Negative      COCAINE Positive (A) Negative      METHADONE Negative Negative      OPIATES Negative Negative      PCP(PHENCYCLIDINE) Negative Negative      THC (TH-CANNABINOL) Negative Negative      Drug screen comment        This test is a screen for drugs of abuse in a medical setting only (i.e., they are unconfirmed results and as such must not be used for non-medical purposes, e.g.,employment testing, legal testing). Due to its inherent nature, false positive (FP) and false negative (FN) results may be obtained. Therefore, if necessary for medical care, recommend confirmation of positive findings by GC/MS. METABOLIC PANEL, BASIC    Collection Time: 01/17/22  4:03 AM   Result Value Ref Range    Sodium 137 136 - 145 mmol/L    Potassium 4.3 3.5 - 5.1 mmol/L    Chloride 101 97 - 108 mmol/L    CO2 34 (H) 21 - 32 mmol/L    Anion gap 2 (L) 5 - 15 mmol/L    Glucose 118 (H) 65 - 100 mg/dL    BUN 26 (H) 6 - 20 mg/dL    Creatinine 0.71 0.70 - 1.30 mg/dL    BUN/Creatinine ratio 37 (H) 12 - 20      GFR est AA >60 >60 ml/min/1.73m2    GFR est non-AA >60 >60 ml/min/1.73m2    Calcium 8.7 8.5 - 10.1 mg/dL   CBC W/O DIFF    Collection Time: 01/17/22  4:03 AM   Result Value Ref Range    WBC 13.7 (H) 4.1 - 11.1 K/uL    RBC 4.63 4.10 - 5.70 M/uL    HGB 10.3 (L) 12.1 - 17.0 g/dL    HCT 35.4 (L) 36.6 - 50.3 %    MCV 76.5 (L) 80.0 - 99.0 FL    MCH 22.2 (L) 26.0 - 34.0 PG    MCHC 29.1 (L) 30.0 - 36.5 g/dL    RDW 19.3 (H) 11.5 - 14.5 %    PLATELET 605 802 - 412 K/uL    NRBC 0.0 0.0  WBC    ABSOLUTE NRBC 0.00 0.00 - 0.01 K/uL       CT HEAD WO CONT   Final Result   No acute intracranial abnormality. XR CHEST PORT   Final Result   Suspected opacities in the left lung base are nonspecific but could represent   pneumonia in the appropriate clinical setting. Short interval follow-up is   recommended to ensure resolution and exclude other causes of opacification. Active Problems:    NSTEMI (non-ST elevated myocardial infarction) ([facility] [address] (1/16/2022)        Assessment/Plan:   1.  Acute respiratory failure with hypoxia-secondary to left lower lobe pneumonia- start IV zosyn, zithromax, consult to pulmonary, wean O2 as tolerated. covid 19 is negative    2. Hx of sleep apnea- will outpatient evaluation after discharge    3. Hx of heparin allergy- on arixtra for DVT prophylaxis    4. HX of POEMS - not on treatment at present time    5. Chest pain - EKG with NSR, no ischemia, troponin elevation,   ? Cocaine use, echo pending, trend cardiac enzymes, cardiology following.          DVT Prophylaxis: fondaparinux  Code Status:  Full Code  POA:    Care Plan discussed with:   _____patient, staff nurse, wife, pulmonary__________________________________________________________    Hiren Vásquez NP

## 2022-01-17 NOTE — CONSULTS
Consult    NAME: Colby Kraft   :  1965   MRN:  [id number]     Date/Time:  2022 12:22 PM    Patient PCP: Nataliia Rader MD  ________________________________________________________________________      Subjective:   CHIEF COMPLAINT: Hypersomnolence and altered mental status. HISTORY OF PRESENT ILLNESS: Chart reviewed and most of the information is obtained from the wife. Wife stated that he is very active and functional and since yesterday he was sleeping all day and at time he appeared to be somewhat confused. Did not give me good history of any motor deficit but he had some shaking of upper extremity and wife said it did not appear like a Parkinson's tremor or a seizure activity. He has history of GI bleed and had a significant drop of hemoglobin in August.  His troponin I is minimally elevated and cardiology consultation is invited. Patient is somewhat somnolent but still respond simple questions and denies any chest pain or palpitation. He was found to be hypoxemic and he was given supplemental oxygen and BiPAP and his mentation had improved initially. 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 Past Medical History:   Diagnosis Date    Chronic pain     bilateral feet,neuropathy    [facility]    Other ill-defined conditions(799.89)     POEMS Syndrome, seen at [place] [address] syndrome 3/11/2010      Past Surgical History:   Procedure Laterality Date    BIOPSY LIVER      CELL COUNT, CSF      x 2    COLONOSCOPY      Upper and Lower     FOOT/TOES SURGERY PROC UNLISTED      Remove Nerve from Left foot     HX GI      liver stent    HX ORTHOPAEDIC      MRI BRAIN W WO CONT      STENT INSERTION      Liver to Kidneys      Allergies   Allergen Reactions    Heparin (Bovine) Other (comments)     Questionable per patient ,back lesions. Meds:  See below  Social History     Tobacco Use    Smoking status: Never Smoker    Smokeless tobacco: Never Used   Substance Use Topics    Alcohol use: Yes   Negative for smoking, takes a social drinker no history of drug abuse. Family History   Family history unknown: Yes        FAMILY HISTORY: Mother is 66years old and her father is 80years old and they are in a reasonable health for their age. 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 REVIEW OF SYSTEMS:     []         Unable to obtain  ROS due to ---   [x]         Total of 12 systems reviewed as follows: Somnolence and altered mental status and some confusion but seems to have resolved    Constitutional: negative fever, negative chills, negative weight loss  Eyes:   negative visual changes  ENT:   negative sore throat, tongue or lip swelling  Respiratory:  negative cough, negative dyspnea  Cards:  negative for chest pain, palpitations, lower extremity edema  GI:   negative for nausea, vomiting, diarrhea, and abdominal pain  Genitourinary: negative for frequency, dysuria  Integument:  negative for rash   Hematologic:  negative for easy bruising and gum/nose bleeding  Musculoskel: negative for myalgias,  back pain  Neurological:  negative for headaches, dizziness, vertigo, weakness  Behavl/Psych: negative for feelings of anxiety, depression     Pertinent Positives include :    Objective:      Physical Exam:    Last 24hrs VS reviewed since prior progress note.  Most recent are:    Visit Vitals  BP (!) 142/83   Pulse 70   Temp 98 °F (36.7 °C)   Resp 15   Ht 5' 11\" (1.803 m)   Wt 122.5 kg (270 lb)   SpO2 94%   BMI 37.66 kg/m²       Intake/Output Summary (Last 24 hours) at 1/17/2022 1222  Last data filed at 1/17/2022 0140  Gross per 24 hour   Intake 350 ml   Output --   Net 350 ml        General Appearance: Well developed, well nourished, alert & oriented x 3, but is a somnolent   no acute distress. Ears/Nose/Mouth/Throat: Pupils equal and round, Hearing grossly normal.  Neck: Supple. JVP within normal limits. Carotids good upstrokes, with no bruit. Chest: Lungs clear to auscultation bilaterally. Cardiovascular: Regular rate and rhythm, S1S2 normal, no murmur, rubs, gallops. Abdomen: Soft, non-tender, bowel sounds are active. No organomegaly. Extremities: No edema bilaterally. Femoral pulses +2, Distal Pulses +1. Skin: Warm and dry. Neuro: CN II-XII grossly intact, Strength and sensation grossly intact. []         Post-cath site without hematoma, bruit, tenderness, or thrill. Distal pulses intact. Data:      Telemetry: Normal sinus rhythm    EKG:  []  No new EKG for review. Prior to Admission medications    Medication Sig Start Date End Date Taking? Authorizing Provider   zolpidem (AMBIEN) 10 mg tablet Take 1 Tablet by mouth nightly as needed for Sleep. Ok to fill 11/10/2015 9/20/21   Fanny Sauceda MD   pantoprazole (PROTONIX) 40 mg tablet Take 1 Tablet by mouth two (2) times a day. 9/20/21   Fanny Sauceda MD   methylphenidate HCl (Metadate CD) 40 mg BP30 Take 40 mg by mouth every morning. Max Daily Amount: 40 mg. 9/20/21   Fanny Sauceda MD   sildenafil citrate (Viagra) 100 mg tablet Take 1 Tablet by mouth as needed for Erectile Dysfunction.  9/20/21   Fanny Sauceda MD       Recent Results (from the past 24 hour(s))   EKG, 12 LEAD, INITIAL    Collection Time: 01/16/22  7:13 PM   Result Value Ref Range    Ventricular Rate 84 BPM    Atrial Rate 84 BPM    P-R Interval 156 ms    QRS Duration 114 ms    Q-T Interval 388 ms    QTC Calculation (Bezet) 458 ms    Calculated P Axis 62 degrees    Calculated R Axis 62 degrees    Calculated T Axis 78 degrees    Diagnosis       Normal sinus rhythm  Normal ECG  When compared with ECG of 15-AUG-2021 15:26,  No significant change was found  Confirmed by FABRIZIO FLYNN, Kathia Sauceda ([id number]) on 1/17/2022 9:32:39 AM     CBC WITH AUTOMATED DIFF Collection Time: 01/16/22  7:15 PM   Result Value Ref Range    WBC 14.6 (H) 4.1 - 11.1 K/uL    RBC 4.91 4.10 - 5.70 M/uL    HGB 11.0 (L) 12.1 - 17.0 g/dL    HCT 37.8 36.6 - 50.3 %    MCV 77.0 (L) 80.0 - 99.0 FL    MCH 22.4 (L) 26.0 - 34.0 PG    MCHC 29.1 (L) 30.0 - 36.5 g/dL    RDW 19.5 (H) 11.5 - 14.5 %    PLATELET 899 853 - 247 K/uL    MPV 11.5 8.9 - 12.9 FL    NRBC 0.0 0.0  WBC    ABSOLUTE NRBC 0.00 0.00 - 0.01 K/uL    NEUTROPHILS 87 (H) 32 - 75 %    LYMPHOCYTES 5 (L) 12 - 49 %    MONOCYTES 7 5 - 13 %    EOSINOPHILS 0 0 - 7 %    BASOPHILS 0 0 - 1 %    IMMATURE GRANULOCYTES 1 (H) 0 - 0.5 %    ABS. NEUTROPHILS 12.9 (H) 1.8 - 8.0 K/UL    ABS. LYMPHOCYTES 0.7 (L) 0.8 - 3.5 K/UL    ABS. MONOCYTES 1.0 0.0 - 1.0 K/UL    ABS. EOSINOPHILS 0.0 0.0 - 0.4 K/UL    ABS. BASOPHILS 0.0 0.0 - 0.1 K/UL    ABS. IMM. GRANS. 0.1 (H) 0.00 - 0.04 K/UL    DF AUTOMATED     METABOLIC PANEL, COMPREHENSIVE    Collection Time: 01/16/22  7:15 PM   Result Value Ref Range    Sodium 137 136 - 145 mmol/L    Potassium 5.1 3.5 - 5.1 mmol/L    Chloride 102 97 - 108 mmol/L    CO2 32 21 - 32 mmol/L    Anion gap 3 (L) 5 - 15 mmol/L    Glucose 159 (H) 65 - 100 mg/dL    BUN 28 (H) 6 - 20 mg/dL    Creatinine 0.86 0.70 - 1.30 mg/dL    BUN/Creatinine ratio 33 (H) 12 - 20      GFR est AA >60 >60 ml/min/1.73m2    GFR est non-AA >60 >60 ml/min/1.73m2    Calcium 8.7 8.5 - 10.1 mg/dL    Bilirubin, total 0.5 0.2 - 1.0 mg/dL    AST (SGOT) 104 (H) 15 - 37 U/L    ALT (SGPT) 58 12 - 78 U/L    Alk.  phosphatase 96 45 - 117 U/L    Protein, total 6.7 6.4 - 8.2 g/dL    Albumin 2.8 (L) 3.5 - 5.0 g/dL    Globulin 3.9 2.0 - 4.0 g/dL    A-G Ratio 0.7 (L) 1.1 - 2.2     TROPONIN-HIGH SENSITIVITY    Collection Time: 01/16/22  7:15 PM   Result Value Ref Range    Troponin-High Sensitivity 158 (HH) 0 - 76 ng/L   SARS-COV-2    Collection Time: 01/16/22  7:23 PM   Result Value Ref Range    SARS-CoV-2 Please find results under separate order     COVID-19 RAPID TEST Collection Time: 01/16/22  8:15 PM   Result Value Ref Range    Specimen source Please find results under separate order      COVID-19 rapid test Not Detected Not Detected     TROPONIN-HIGH SENSITIVITY    Collection Time: 01/16/22  9:14 PM   Result Value Ref Range    Troponin-High Sensitivity 218 (HH) 0 - 76 ng/L   PROCALCITONIN    Collection Time: 01/17/22  1:30 AM   Result Value Ref Range    Procalcitonin 0.30 (H) 0 ng/mL   URINALYSIS W/MICROSCOPIC    Collection Time: 01/17/22  3:29 AM   Result Value Ref Range    Color Yellow/Straw      Appearance Clear Clear      Specific gravity >1.030 (H) 1.003 - 1.030    pH (UA) 5.0 5.0 - 8.0      Protein 100 (A) Negative mg/dL    Glucose Negative Negative mg/dL    Ketone Negative Negative mg/dL    Bilirubin Negative Negative      Blood Small (A) Negative      Urobilinogen 0.1 0.1 - 1.0 EU/dL    Nitrites Negative Negative      Leukocyte Esterase Negative Negative      WBC 0-5 0 - 4 /hpf    RBC 5-10 0 - 5 /hpf    Bacteria Negative Negative /hpf    Mucus Trace /lpf   DRUG SCREEN, URINE    Collection Time: 01/17/22  3:29 AM   Result Value Ref Range    AMPHETAMINES Negative Negative      BARBITURATES Negative Negative      BENZODIAZEPINES Negative Negative      COCAINE Positive (A) Negative      METHADONE Negative Negative      OPIATES Negative Negative      PCP(PHENCYCLIDINE) Negative Negative      THC (TH-CANNABINOL) Negative Negative      Drug screen comment        This test is a screen for drugs of abuse in a medical setting only (i.e., they are unconfirmed results and as such must not be used for non-medical purposes, e.g.,employment testing, legal testing). Due to its inherent nature, false positive (FP) and false negative (FN) results may be obtained. Therefore, if necessary for medical care, recommend confirmation of positive findings by GC/MS.    METABOLIC PANEL, BASIC    Collection Time: 01/17/22  4:03 AM   Result Value Ref Range    Sodium 137 136 - 145 mmol/L    Potassium 4.3 3.5 - 5.1 mmol/L    Chloride 101 97 - 108 mmol/L    CO2 34 (H) 21 - 32 mmol/L    Anion gap 2 (L) 5 - 15 mmol/L    Glucose 118 (H) 65 - 100 mg/dL    BUN 26 (H) 6 - 20 mg/dL    Creatinine 0.71 0.70 - 1.30 mg/dL    BUN/Creatinine ratio 37 (H) 12 - 20      GFR est AA >60 >60 ml/min/1.73m2    GFR est non-AA >60 >60 ml/min/1.73m2    Calcium 8.7 8.5 - 10.1 mg/dL   CBC W/O DIFF    Collection Time: 01/17/22  4:03 AM   Result Value Ref Range    WBC 13.7 (H) 4.1 - 11.1 K/uL    RBC 4.63 4.10 - 5.70 M/uL    HGB 10.3 (L) 12.1 - 17.0 g/dL    HCT 35.4 (L) 36.6 - 50.3 %    MCV 76.5 (L) 80.0 - 99.0 FL    MCH 22.2 (L) 26.0 - 34.0 PG    MCHC 29.1 (L) 30.0 - 36.5 g/dL    RDW 19.3 (H) 11.5 - 14.5 %    PLATELET 065 485 - 353 K/uL    NRBC 0.0 0.0  WBC    ABSOLUTE NRBC 0.00 0.00 - 0.01 K/uL         Assessment:   Hypersomnolence and altered mental status, etiology is unclear. Troponin I is minimally elevated. History of neuropathy. Hypoxemia. 1.         Plan:   I will check serial EKG and enzymes and echocardiogram.  Probably need to evaluate for metabolic status. May require neurological evaluation. Thank you.   1.       []        High complexity decision making was performed    Mumtaz Felix MD

## 2022-01-17 NOTE — MED STUDENT NOTES
Pulmonary and Critical Care Consult    Subjective:   Consult Note: 1/17/2022 @no control      Chief Complaint:   Chief Complaint   Patient presents with    Altered mental status      This patient was asked to be evaluated due to hypoxemia   Patient is a 55 y.o. male with a past medical history of POEMS and GI bleed who was brought to the ED via ambulance due to altered mentation. The patient says that he had nasal congestion and rhinorhea the last week and then took his son on a long drive. When he returned home on Sunday, he went to sleep. Later his wife was unable to arouse him and called the ambulance. According to records, EMS found him to be hypoxic with SpO2 in the 50s and he was placed on nonrebreather. He has regained his mentation and is able to answer questions appropriately now. Patient denies shortness of breath and wheezing. He does have a cough that rarely brings up a brown sputum. He is on 4L NC with SpO2 of 95%. Chest x-ray noted opacities in the left lung base. CT head showed no acute intracranial abnormalities. Toxicology was positive for cocaine     He has never smoked  He has a history of childhood asthma but does not take any medications currently  He has symptoms and family history of sleep apnea but has never been tested or diagnosed      Review of Systems:  A comprehensive review of systems was negative except for that written in the HPI.     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 3/11/2010     Past Surgical History:   Procedure Laterality Date    BIOPSY LIVER      CELL COUNT, CSF      x 2    COLONOSCOPY      Upper and Lower     FOOT/TOES SURGERY PROC UNLISTED      Remove Nerve from Left foot     HX GI      liver stent    HX ORTHOPAEDIC      MRI BRAIN W WO CONT  STENT INSERTION      Liver to Kidneys       Family History   Family history unknown: Yes     Social History     Tobacco Use    Smoking status: Never Smoker    Smokeless tobacco: Never Used   Substance Use Topics    Alcohol use: Yes      Current Facility-Administered Medications   Medication Dose Route Frequency Provider Last Rate Last Admin    . PHARMACY TO SUBSTITUTE PER PROTOCOL (Reordered from: methylphenidate HCl (Metadate CD) 40 mg BP30)    Per Protocol Mady Segal MD        sodium chloride (NS) flush 5-40 mL  5-40 mL IntraVENous Q8H Mady Segal MD   10 mL at 01/16/22 2308    sodium chloride (NS) flush 5-40 mL  5-40 mL IntraVENous PRN Mady Segal MD        acetaminophen (TYLENOL) tablet 650 mg  650 mg Oral Q6H PRN Merna Cole MD   650 mg at 01/17/22 9755    Or    acetaminophen (TYLENOL) suppository 650 mg  650 mg Rectal Q6H PRN Mady Segal MD        polyethylene glycol (MIRALAX) packet 17 g  17 g Oral DAILY PRN Mady Segal MD        ondansetron (ZOFRAN ODT) tablet 4 mg  4 mg Oral Q8H PRN Mady Segal MD        Or    ondansetron (ZOFRAN) injection 4 mg  4 mg IntraVENous Q6H PRN Merna Cole MD        fondaparinux (ARIXTRA) injection 2.5 mg  2.5 mg SubCUTAneous DAILY Mady Segal MD   2.5 mg at 01/17/22 0835    pantoprazole (PROTONIX) tablet 40 mg  40 mg Oral ACB&D Merna Cole MD   40 mg at 01/17/22 1330     Current Outpatient Medications   Medication Sig Dispense Refill    zolpidem (AMBIEN) 10 mg tablet Take 1 Tablet by mouth nightly as needed for Sleep. Ok to fill 11/10/2015 90 Tablet 1    pantoprazole (PROTONIX) 40 mg tablet Take 1 Tablet by mouth two (2) times a day. 180 Tablet 3    methylphenidate HCl (Metadate CD) 40 mg BP30 Take 40 mg by mouth every morning. Max Daily Amount: 40 mg. 90 Capsule 0    sildenafil citrate (Viagra) 100 mg tablet Take 1 Tablet by mouth as needed for Erectile Dysfunction.  6 Tablet 1          Allergies   Allergen Reactions    Heparin (Bovine) Other (comments)     Questionable per patient ,back lesions. Objective:     Blood pressure (!) 142/83, pulse 70, temperature 98 °F (36.7 °C), resp. rate 15, height 5' 11\" (1.803 m), weight 270 lb (122.5 kg), SpO2 94 %. Temp (24hrs), Av °F (36.7 °C), Min:98 °F (36.7 °C), Max:98 °F (36.7 °C)      Intake and Output:  Current Shift: No intake/output data recorded. Last 3 Shifts: 01/15 190 -  0700  In: 350 [I.V.:350]  Out: -     Intake/Output Summary (Last 24 hours) at 2022 1453  Last data filed at 2022 0140  Gross per 24 hour   Intake 350 ml   Output --   Net 350 ml        Physical Exam:     General: Lying in bed comfortably, no acute distress  Eye: Reactive, symmetric  Throat and Neck: Supple  Lung: Reduced air entry bilaterally with prolonged exhalation but no wheezing. Occasional crackles. Heart: S1+S2. No murmurs  Abdomen: soft, non-tender. Bowel sounds normal. No masses; obese  Extremities: No edema  : Not done  Skin: No cyanosis  Neurologic: A & O x3.   Grossly nonfocal  Psychiatric: Appropriate affect; coherent      Lab/Data Review:    Recent Results (from the past 24 hour(s))   EKG, 12 LEAD, INITIAL    Collection Time: 22  7:13 PM   Result Value Ref Range    Ventricular Rate 84 BPM    Atrial Rate 84 BPM    P-R Interval 156 ms    QRS Duration 114 ms    Q-T Interval 388 ms    QTC Calculation (Bezet) 458 ms    Calculated P Axis 62 degrees    Calculated R Axis 62 degrees    Calculated T Axis 78 degrees    Diagnosis       Normal sinus rhythm  Normal ECG  When compared with ECG of 15-AUG-2021 15:26,  No significant change was found  Confirmed by FABRIZIO FLYNN, Kristopher Bean ([id number]) on 2022 9:32:39 AM     CBC WITH AUTOMATED DIFF    Collection Time: 22  7:15 PM   Result Value Ref Range    WBC 14.6 (H) 4.1 - 11.1 K/uL    RBC 4.91 4.10 - 5.70 M/uL    HGB 11.0 (L) 12.1 - 17.0 g/dL    HCT 37.8 36.6 - 50.3 %    MCV 77.0 (L) 80.0 - 99.0 FL    MCH 22.4 (L) 26.0 - 34.0 PG    MCHC 29.1 (L) 30.0 - 36.5 g/dL    RDW 19.5 (H) 11.5 - 14.5 %    PLATELET 280 104 - 493 K/uL    MPV 11.5 8.9 - 12.9 FL    NRBC 0.0 0.0  WBC    ABSOLUTE NRBC 0.00 0.00 - 0.01 K/uL    NEUTROPHILS 87 (H) 32 - 75 %    LYMPHOCYTES 5 (L) 12 - 49 %    MONOCYTES 7 5 - 13 %    EOSINOPHILS 0 0 - 7 %    BASOPHILS 0 0 - 1 %    IMMATURE GRANULOCYTES 1 (H) 0 - 0.5 %    ABS. NEUTROPHILS 12.9 (H) 1.8 - 8.0 K/UL    ABS. LYMPHOCYTES 0.7 (L) 0.8 - 3.5 K/UL    ABS. MONOCYTES 1.0 0.0 - 1.0 K/UL    ABS. EOSINOPHILS 0.0 0.0 - 0.4 K/UL    ABS. BASOPHILS 0.0 0.0 - 0.1 K/UL    ABS. IMM. GRANS. 0.1 (H) 0.00 - 0.04 K/UL    DF AUTOMATED     METABOLIC PANEL, COMPREHENSIVE    Collection Time: 01/16/22  7:15 PM   Result Value Ref Range    Sodium 137 136 - 145 mmol/L    Potassium 5.1 3.5 - 5.1 mmol/L    Chloride 102 97 - 108 mmol/L    CO2 32 21 - 32 mmol/L    Anion gap 3 (L) 5 - 15 mmol/L    Glucose 159 (H) 65 - 100 mg/dL    BUN 28 (H) 6 - 20 mg/dL    Creatinine 0.86 0.70 - 1.30 mg/dL    BUN/Creatinine ratio 33 (H) 12 - 20      GFR est AA >60 >60 ml/min/1.73m2    GFR est non-AA >60 >60 ml/min/1.73m2    Calcium 8.7 8.5 - 10.1 mg/dL    Bilirubin, total 0.5 0.2 - 1.0 mg/dL    AST (SGOT) 104 (H) 15 - 37 U/L    ALT (SGPT) 58 12 - 78 U/L    Alk.  phosphatase 96 45 - 117 U/L    Protein, total 6.7 6.4 - 8.2 g/dL    Albumin 2.8 (L) 3.5 - 5.0 g/dL    Globulin 3.9 2.0 - 4.0 g/dL    A-G Ratio 0.7 (L) 1.1 - 2.2     TROPONIN-HIGH SENSITIVITY    Collection Time: 01/16/22  7:15 PM   Result Value Ref Range    Troponin-High Sensitivity 158 (HH) 0 - 76 ng/L   SARS-COV-2    Collection Time: 01/16/22  7:23 PM   Result Value Ref Range    SARS-CoV-2 Please find results under separate order     COVID-19 RAPID TEST    Collection Time: 01/16/22  8:15 PM   Result Value Ref Range    Specimen source Please find results under separate order      COVID-19 rapid test Not Detected Not Detected     TROPONIN-HIGH SENSITIVITY    Collection Time: 01/16/22  9:14 PM   Result Value Ref Range    Troponin-High Sensitivity 218 (HH) 0 - 76 ng/L   PROCALCITONIN    Collection Time: 01/17/22  1:30 AM   Result Value Ref Range    Procalcitonin 0.30 (H) 0 ng/mL   URINALYSIS W/MICROSCOPIC    Collection Time: 01/17/22  3:29 AM   Result Value Ref Range    Color Yellow/Straw      Appearance Clear Clear      Specific gravity >1.030 (H) 1.003 - 1.030    pH (UA) 5.0 5.0 - 8.0      Protein 100 (A) Negative mg/dL    Glucose Negative Negative mg/dL    Ketone Negative Negative mg/dL    Bilirubin Negative Negative      Blood Small (A) Negative      Urobilinogen 0.1 0.1 - 1.0 EU/dL    Nitrites Negative Negative      Leukocyte Esterase Negative Negative      WBC 0-5 0 - 4 /hpf    RBC 5-10 0 - 5 /hpf    Bacteria Negative Negative /hpf    Mucus Trace /lpf   DRUG SCREEN, URINE    Collection Time: 01/17/22  3:29 AM   Result Value Ref Range    AMPHETAMINES Negative Negative      BARBITURATES Negative Negative      BENZODIAZEPINES Negative Negative      COCAINE Positive (A) Negative      METHADONE Negative Negative      OPIATES Negative Negative      PCP(PHENCYCLIDINE) Negative Negative      THC (TH-CANNABINOL) Negative Negative      Drug screen comment        This test is a screen for drugs of abuse in a medical setting only (i.e., they are unconfirmed results and as such must not be used for non-medical purposes, e.g.,employment testing, legal testing). Due to its inherent nature, false positive (FP) and false negative (FN) results may be obtained. Therefore, if necessary for medical care, recommend confirmation of positive findings by GC/MS.    METABOLIC PANEL, BASIC    Collection Time: 01/17/22  4:03 AM   Result Value Ref Range    Sodium 137 136 - 145 mmol/L    Potassium 4.3 3.5 - 5.1 mmol/L    Chloride 101 97 - 108 mmol/L    CO2 34 (H) 21 - 32 mmol/L    Anion gap 2 (L) 5 - 15 mmol/L    Glucose 118 (H) 65 - 100 mg/dL    BUN 26 (H) 6 - 20 mg/dL    Creatinine 0.71 0.70 - 1.30 mg/dL    BUN/Creatinine ratio 37 (H) 12 - 20 GFR est AA >60 >60 ml/min/1.73m2    GFR est non-AA >60 >60 ml/min/1.73m2    Calcium 8.7 8.5 - 10.1 mg/dL   CBC W/O DIFF    Collection Time: 01/17/22  4:03 AM   Result Value Ref Range    WBC 13.7 (H) 4.1 - 11.1 K/uL    RBC 4.63 4.10 - 5.70 M/uL    HGB 10.3 (L) 12.1 - 17.0 g/dL    HCT 35.4 (L) 36.6 - 50.3 %    MCV 76.5 (L) 80.0 - 99.0 FL    MCH 22.2 (L) 26.0 - 34.0 PG    MCHC 29.1 (L) 30.0 - 36.5 g/dL    RDW 19.3 (H) 11.5 - 14.5 %    PLATELET 927 565 - 780 K/uL    NRBC 0.0 0.0  WBC    ABSOLUTE NRBC 0.00 0.00 - 0.01 K/uL       CT HEAD WO CONT   Final Result   No acute intracranial abnormality. XR CHEST PORT   Final Result   Suspected opacities in the left lung base are nonspecific but could represent   pneumonia in the appropriate clinical setting. Short interval follow-up is   recommended to ensure resolution and exclude other causes of opacification. CT Results  (Last 48 hours)               01/16/22 2140  CT HEAD WO CONT Final result    Impression:  No acute intracranial abnormality. Narrative:  Study: Head CT without contrast.       Clinical indication: Altered mental status. Technique: Routine volume acquisition of the head was obtained without IV   contrast. Coronal and sagittal reformations were generated in soft tissue and   bone kernels. Dose reduction: All CT scans at this facility are performed using   dose reduction optimization techniques as appropriate to a performed exam   including the following: Automated exposure control, adjustments of the mA   and/or kV according to patient size, or use of iterative reconstruction   technique. Comparison: None available. Findings:        No evidence of acute intracranial hemorrhage, mass effect, midline shift or   abnormal extra-axial fluid collection. Ventricles and basal cisterns are   preserved and are symmetric. Gray-white matter differentiation is maintained. No evidence of skull fracture.  Visualized paranasal sinuses and mastoid air   cells are clear. Globes and orbits are intact. Assessment:   1. Altered mental status   2. Hypoxemia  3. Left lower lung aspiration pneumonia   4. Cocaine intoxication  5. POEMS syndrome  6. Snoring/ apneas    Plan:     Patient is on 4L NC and 95%  Continue oxygen supplementation to maintain O2 >92%    Chest xray showed possible aspiration pneumonia   Patient was given Zosyn and Vancomycin in the ED  Cultures to be sent  Continue Zosyn    Patient has risk factors for sleep apnea but is not interested in getting care for it    Patient had elevated troponin in which Cardiology is following    DVT and GI prophylaxis      Yanet Wale  1/17/2022  2:53 PM            *ATTENTION:  This note has been created by a medical student for educational purposes only. Please do not refer to the content of this note for clinical decision-making, billing, or other purposes. Please see attending physicians note to obtain clinical information on this patient. *

## 2022-01-18 LAB
ANION GAP SERPL CALC-SCNC: 1 MMOL/L (ref 5–15)
BUN SERPL-MCNC: 18 MG/DL (ref 6–20)
BUN/CREAT SERPL: 24 (ref 12–20)
CA-I BLD-MCNC: 8.5 MG/DL (ref 8.5–10.1)
CHLORIDE SERPL-SCNC: 102 MMOL/L (ref 97–108)
CO2 SERPL-SCNC: 36 MMOL/L (ref 21–32)
CREAT SERPL-MCNC: 0.76 MG/DL (ref 0.7–1.3)
ERYTHROCYTE [DISTWIDTH] IN BLOOD BY AUTOMATED COUNT: 19 % (ref 11.5–14.5)
GLUCOSE SERPL-MCNC: 107 MG/DL (ref 65–100)
HCT VFR BLD AUTO: 34.7 % (ref 36.6–50.3)
HGB BLD-MCNC: 10.2 G/DL (ref 12.1–17)
MCH RBC QN AUTO: 22.5 PG (ref 26–34)
MCHC RBC AUTO-ENTMCNC: 29.4 G/DL (ref 30–36.5)
MCV RBC AUTO: 76.4 FL (ref 80–99)
NRBC # BLD: 0 K/UL (ref 0–0.01)
NRBC BLD-RTO: 0 PER 100 WBC
PLATELET # BLD AUTO: 173 K/UL (ref 150–400)
PMV BLD AUTO: 11.7 FL (ref 8.9–12.9)
POTASSIUM SERPL-SCNC: 4.3 MMOL/L (ref 3.5–5.1)
RBC # BLD AUTO: 4.54 M/UL (ref 4.1–5.7)
SARS-COV-2, XPLCVT: NOT DETECTED
SODIUM SERPL-SCNC: 139 MMOL/L (ref 136–145)
SOURCE, COVRS: NORMAL
TROPONIN-HIGH SENSITIVITY: 140 NG/L (ref 0–76)
TROPONIN-HIGH SENSITIVITY: 98 NG/L (ref 0–76)
WBC # BLD AUTO: 11.3 K/UL (ref 4.1–11.1)

## 2022-01-18 PROCEDURE — 74011250637 HC RX REV CODE- 250/637: Performed by: PHYSICIAN ASSISTANT

## 2022-01-18 PROCEDURE — 94640 AIRWAY INHALATION TREATMENT: CPT

## 2022-01-18 PROCEDURE — 74011250637 HC RX REV CODE- 250/637: Performed by: INTERNAL MEDICINE

## 2022-01-18 PROCEDURE — 84484 ASSAY OF TROPONIN QUANT: CPT

## 2022-01-18 PROCEDURE — 74011250636 HC RX REV CODE- 250/636: Performed by: NURSE PRACTITIONER

## 2022-01-18 PROCEDURE — 74011000250 HC RX REV CODE- 250: Performed by: INTERNAL MEDICINE

## 2022-01-18 PROCEDURE — 93005 ELECTROCARDIOGRAM TRACING: CPT

## 2022-01-18 PROCEDURE — 36415 COLL VENOUS BLD VENIPUNCTURE: CPT

## 2022-01-18 PROCEDURE — 65270000029 HC RM PRIVATE

## 2022-01-18 PROCEDURE — 85027 COMPLETE CBC AUTOMATED: CPT

## 2022-01-18 PROCEDURE — 74011000258 HC RX REV CODE- 258: Performed by: NURSE PRACTITIONER

## 2022-01-18 PROCEDURE — 80048 BASIC METABOLIC PNL TOTAL CA: CPT

## 2022-01-18 RX ORDER — AMLODIPINE BESYLATE 5 MG/1
5 TABLET ORAL DAILY
Status: DISCONTINUED | OUTPATIENT
Start: 2022-01-18 | End: 2022-01-20 | Stop reason: HOSPADM

## 2022-01-18 RX ORDER — BUTALBITAL, ACETAMINOPHEN AND CAFFEINE 50; 325; 40 MG/1; MG/1; MG/1
1 TABLET ORAL
Status: DISCONTINUED | OUTPATIENT
Start: 2022-01-18 | End: 2022-01-20 | Stop reason: HOSPADM

## 2022-01-18 RX ADMIN — PANTOPRAZOLE SODIUM 40 MG: 40 TABLET, DELAYED RELEASE ORAL at 15:46

## 2022-01-18 RX ADMIN — IPRATROPIUM BROMIDE AND ALBUTEROL SULFATE 3 ML: .5; 2.5 SOLUTION RESPIRATORY (INHALATION) at 09:00

## 2022-01-18 RX ADMIN — ACETAMINOPHEN 650 MG: 325 TABLET ORAL at 13:15

## 2022-01-18 RX ADMIN — PIPERACILLIN AND TAZOBACTAM 3.38 G: 3; .375 INJECTION, POWDER, LYOPHILIZED, FOR SOLUTION INTRAVENOUS at 01:32

## 2022-01-18 RX ADMIN — ACETAMINOPHEN 650 MG: 325 TABLET ORAL at 06:36

## 2022-01-18 RX ADMIN — SODIUM CHLORIDE, PRESERVATIVE FREE 10 ML: 5 INJECTION INTRAVENOUS at 15:47

## 2022-01-18 RX ADMIN — AMLODIPINE BESYLATE 5 MG: 5 TABLET ORAL at 15:48

## 2022-01-18 RX ADMIN — PIPERACILLIN AND TAZOBACTAM 3.38 G: 3; .375 INJECTION, POWDER, LYOPHILIZED, FOR SOLUTION INTRAVENOUS at 18:48

## 2022-01-18 RX ADMIN — PIPERACILLIN AND TAZOBACTAM 3.38 G: 3; .375 INJECTION, POWDER, LYOPHILIZED, FOR SOLUTION INTRAVENOUS at 09:53

## 2022-01-18 RX ADMIN — SODIUM CHLORIDE, PRESERVATIVE FREE 10 ML: 5 INJECTION INTRAVENOUS at 06:52

## 2022-01-18 RX ADMIN — BUTALBITAL, ACETAMINOPHEN, AND CAFFEINE 1 TABLET: 50; 325; 40 TABLET ORAL at 13:15

## 2022-01-18 RX ADMIN — PANTOPRAZOLE SODIUM 40 MG: 40 TABLET, DELAYED RELEASE ORAL at 09:52

## 2022-01-18 RX ADMIN — AZITHROMYCIN MONOHYDRATE 500 MG: 500 INJECTION, POWDER, LYOPHILIZED, FOR SOLUTION INTRAVENOUS at 15:46

## 2022-01-18 NOTE — PROGRESS NOTES
Pulmonary and Critical Care progress note    Subjective:   Consult Note: 1/18/2022 @no control      Chief Complaint:   Chief Complaint   Patient presents with    Altered mental status        This patient has been seen and evaluated at the request of Augustina Matias NP. Patient seen and examined in ED  Overnight events noted    Lying in bed  Remains sleepy though arousable and appropriate when awake  Wife at bedside  No acute distress  Currently on 3 L nasal cannula oxygen    ABGs today show pH of 7.32, PCO2 72, PO2 137, bicarb 32, saturation 100% on room air    Review of Systems:  A comprehensive review of systems was negative except for that written in the HPI. Current Facility-Administered Medications   Medication Dose Route Frequency Provider Last Rate Last Admin    butalbital-acetaminophen-caffeine (FIORICET, ESGIC) -40 mg per tablet 1 Tablet  1 Tablet Oral Q4H PRN Damián Ramirze PA-C   1 Tablet at 01/18/22 1315    [START ON 1/19/2022] amLODIPine (NORVASC) tablet 5 mg  5 mg Oral DAILY Carrie Daniels MD        piperacillin-tazobactam (ZOSYN) 3.375 g in 0.9% sodium chloride (MBP/ADV) 100 mL MBP  3.375 g IntraVENous Q8H Jorge Luis Dang NP 25 mL/hr at 01/18/22 0953 3.375 g at 01/18/22 0953    azithromycin (ZITHROMAX) 500 mg in 0.9% sodium chloride 250 mL (VIAL-MATE)  500 mg IntraVENous Q24H Jorge Luis Dang NP   IV Completed at 01/18/22 5031    hydrOXYzine pamoate (VISTARIL) capsule 25 mg  25 mg Oral TID PRN Jorge Luis Dang NP        melatonin tablet 3 mg  3 mg Oral QHS PRN Jorge Luis Dang NP        albuterol-ipratropium (DUO-NEB) 2.5 MG-0.5 MG/3 ML  3 mL Nebulization Q6H PRN Jorge Luis Dang NP        albuterol (PROVENTIL HFA, VENTOLIN HFA, PROAIR HFA) inhaler 2 Puff  2 Puff Inhalation Q4H PRN Shari Dang NP        . PHARMACY TO SUBSTITUTE PER PROTOCOL (Reordered from: methylphenidate HCl (Metadate CD) 40 mg BP30)    Per Protocol Idania Segal MD        sodium chloride (NS) flush 5-40 mL  5-40 mL IntraVENous Q8H Destiny Segal MD   10 mL at 22 0691    sodium chloride (NS) flush 5-40 mL  5-40 mL IntraVENous PRN Destiny Segal MD        acetaminophen (TYLENOL) tablet 650 mg  650 mg Oral Q6H PRN Maite Camara MD   650 mg at 22 1315    Or    acetaminophen (TYLENOL) suppository 650 mg  650 mg Rectal Q6H PRN Destiny Segal MD        polyethylene glycol (MIRALAX) packet 17 g  17 g Oral DAILY PRN Destiny Segal MD        ondansetron (ZOFRAN ODT) tablet 4 mg  4 mg Oral Q8H PRN Destiny Segal MD        Or    ondansetron (ZOFRAN) injection 4 mg  4 mg IntraVENous Q6H PRN Destiny Segal MD        fondaparinux (ARIXTRA) injection 2.5 mg  2.5 mg SubCUTAneous DAILY Destiny Segal MD   2.5 mg at 22 0835    pantoprazole (PROTONIX) tablet 40 mg  40 mg Oral ACB&D Maite Camara MD   40 mg at 22 9763     Current Outpatient Medications   Medication Sig Dispense Refill    pantoprazole (PROTONIX) 40 mg tablet Take 1 Tablet by mouth two (2) times a day. 180 Tablet 3    methylphenidate HCl (Metadate CD) 40 mg BP30 Take 40 mg by mouth every morning. Max Daily Amount: 40 mg. 90 Capsule 0    zolpidem (AMBIEN) 10 mg tablet Take 1 Tablet by mouth nightly as needed for Sleep. Ok to fill 11/10/2015 90 Tablet 1    sildenafil citrate (Viagra) 100 mg tablet Take 1 Tablet by mouth as needed for Erectile Dysfunction. 6 Tablet 1          Allergies   Allergen Reactions    Heparin (Bovine) Other (comments)     Questionable per patient ,back lesions. Objective:     Blood pressure (!) 148/86, pulse 69, temperature 98.2 °F (36.8 °C), resp. rate 16, height 5' 11\" (1.803 m), weight 122.5 kg (270 lb), SpO2 96 %. Temp (24hrs), Av.2 °F (36.8 °C), Min:98.2 °F (36.8 °C), Max:98.2 °F (36.8 °C)      Intake and Output:  Current Shift: No intake/output data recorded.   Last 3 Shifts:  1901 -  0700  In: 450 [I.V.:450]  Out: -     Intake/Output Summary (Last 24 hours) at 2022 [address] filed at 1/17/2022 1835  Gross per 24 hour   Intake 100 ml   Output --   Net 100 ml        Physical Exam:     General: Lying in bed comfortably, no acute distress on 3 L nasal cannula oxygen  Eye: Reactive, symmetric  Throat and Neck: Supple  Lung: Reduced air entry bilaterally with prolonged exhalation but no wheezing. Crackles in left mid and lower lung zone. Heart: S1+S2. No murmurs  Abdomen: soft, non-tender. Bowel sounds normal. No masses; obese  Extremities: No edema  : Not done  Skin: No cyanosis  Neurologic: A & O x3.   Grossly nonfocal  Psychiatric: Appropriate affect; coherent      Lab/Data Review:    Recent Results (from the past 24 hour(s))   ECHO ADULT COMPLETE    Collection Time: 01/17/22  5:14 PM   Result Value Ref Range    AV Mean Gradient 13 mmHg    AV VTI 52.9 cm    AV Mean Velocity 1.7 m/s    AV Peak Velocity 2.6 m/s    AV Peak Gradient 26 mmHg    AV Area by VTI 1.8 cm2    AV Area by Peak Velocity 1.5 cm2    Aortic Root 3.5 cm    IVSd 1.5 (A) 0.6 - 1.0 cm    LVIDd 5.4 4.2 - 5.9 cm    LVIDs 3.5 cm    LVOT Diameter 2.0 cm    LVOT Mean Gradient 3 mmHg    LVOT VTI 29.5 cm    LVOT Peak Velocity 1.3 m/s    LVOT Peak Gradient 6 mmHg    LVPWd 0.9 0.6 - 1.0 cm    LV E' Lateral Velocity 12 cm/s    LV E' Septal Velocity 7 cm/s    LVOT Area 3.1 cm2    LVOT SV 92.6 ml    LA Diameter 3.7 cm    MR Peak Velocity 1.9 m/s    MR Peak Gradient 14 mmHg    MV E Wave Deceleration Time 285.0 ms    MV A Velocity 1.23 m/s    MV E Velocity 1.25 m/s    TN Max Velocity 1.0 m/s    Pulmonary Artery EDP 4 mmHg    PV Max Velocity 1.0 m/s    PV Peak Gradient 4 mmHg    Pulm Vein A Duration 74.0 ms    Pulm Vein A Velocity 0.3 m/s    Est. RA Pressure 15 mmHg    RVIDd 4.7 cm    TR Max Velocity 2.98 m/s    TR Peak Gradient 36 mmHg    Fractional Shortening 2D 35 28 - 44 %    LVIDd Index 2.25 cm/m2    LVIDs Index 1.46 cm/m2    LV RWT Ratio 0.33     LV Mass 2D 264.4 (A) 88 - 224 g    LV Mass 2D Index 110.2 49 - 115 g/m2    MV E/A 1.02     E/E' Ratio (Averaged) 14.14     E/E' Lateral 10.42     E/E' Septal 17.86     LVOT Stroke Volume Index 38.6 mL/m2    LA Size Index 1.54 cm/m2    LA/AO Root Ratio 1.06     Ao Root Index 1.46 cm/m2    AV Velocity Ratio 0.50     LVOT:AV VTI Index 0.56     VIKI/BSA VTI 0.8 cm2/m2    VIKI/BSA Peak Velocity 0.6 cm2/m2    RVSP 51 mmHg    EF BP 65 55 - 100 %   BLOOD GAS, ARTERIAL    Collection Time: 01/17/22  6:40 PM   Result Value Ref Range    pH 7.32 (L) 7.35 - 7.45      PCO2 72 (H) 35 - 45 mmHg    PO2 137 (H) 75 - 100 mmHg    O2  >95 %    BICARBONATE 32 (H) 22 - 26 mmol/L    BASE EXCESS 8.5 (H) 0 - 2 mmol/L    O2 METHOD Room air      FIO2 21.0 %    SITE Right Radial      MIKAL'S TEST PASS     TROPONIN-HIGH SENSITIVITY    Collection Time: 01/17/22  7:26 PM   Result Value Ref Range    Troponin-High Sensitivity 149 (HH) 0 - 76 ng/L   TROPONIN-HIGH SENSITIVITY    Collection Time: 01/18/22  1:39 AM   Result Value Ref Range    Troponin-High Sensitivity 140 (HH) 0 - 76 ng/L   CBC W/O DIFF    Collection Time: 01/18/22  1:39 AM   Result Value Ref Range    WBC 11.3 (H) 4.1 - 11.1 K/uL    RBC 4.54 4.10 - 5.70 M/uL    HGB 10.2 (L) 12.1 - 17.0 g/dL    HCT 34.7 (L) 36.6 - 50.3 %    MCV 76.4 (L) 80.0 - 99.0 FL    MCH 22.5 (L) 26.0 - 34.0 PG    MCHC 29.4 (L) 30.0 - 36.5 g/dL    RDW 19.0 (H) 11.5 - 14.5 %    PLATELET 060 684 - 604 K/uL    MPV 11.7 8.9 - 12.9 FL    NRBC 0.0 0.0  WBC    ABSOLUTE NRBC 0.00 0.00 - 5.56 K/uL   METABOLIC PANEL, BASIC    Collection Time: 01/18/22  1:39 AM   Result Value Ref Range    Sodium 139 136 - 145 mmol/L    Potassium 4.3 3.5 - 5.1 mmol/L    Chloride 102 97 - 108 mmol/L    CO2 36 (H) 21 - 32 mmol/L    Anion gap 1 (L) 5 - 15 mmol/L    Glucose 107 (H) 65 - 100 mg/dL    BUN 18 6 - 20 mg/dL    Creatinine 0.76 0.70 - 1.30 mg/dL    BUN/Creatinine ratio 24 (H) 12 - 20      GFR est AA >60 >60 ml/min/1.73m2    GFR est non-AA >60 >60 ml/min/1.73m2    Calcium 8.5 8.5 - 10.1 mg/dL CT HEAD WO CONT   Final Result   No acute intracranial abnormality. XR CHEST PORT   Final Result   Suspected opacities in the left lung base are nonspecific but could represent   pneumonia in the appropriate clinical setting. Short interval follow-up is   recommended to ensure resolution and exclude other causes of opacification. CT Results  (Last 48 hours)               01/16/22 2140  CT HEAD WO CONT Final result    Impression:  No acute intracranial abnormality. Narrative:  Study: Head CT without contrast.       Clinical indication: Altered mental status. Technique: Routine volume acquisition of the head was obtained without IV   contrast. Coronal and sagittal reformations were generated in soft tissue and   bone kernels. Dose reduction: All CT scans at this facility are performed using   dose reduction optimization techniques as appropriate to a performed exam   including the following: Automated exposure control, adjustments of the mA   and/or kV according to patient size, or use of iterative reconstruction   technique. Comparison: None available. Findings:        No evidence of acute intracranial hemorrhage, mass effect, midline shift or   abnormal extra-axial fluid collection. Ventricles and basal cisterns are   preserved and are symmetric. Gray-white matter differentiation is maintained. No evidence of skull fracture. Visualized paranasal sinuses and mastoid air   cells are clear. Globes and orbits are intact. Assessment:     1. Acute respiratory failure with hypercapnia and hypoxia  2. Left lower lobe pneumonia  3. Altered mental status/hypersomnia  4. Cocaine abuse  5. POEMS syndrome  6. Snoring/apnea  7.   Asthma    Plan:     Patient seen in the ED  Being admitted to the hospital  Will be watched here closely    Currently on 3 L nasal cannula oxygen  ABG showed significant hypercapnia  Start BiPAP 12/6, rate of 16, FiO2 30%  Further changes in BiPAP settings based on clinical response and ABG results  Will give breaks for meals from BiPAP    Left lower lobe pneumonia on chest x-ray  Continue broad-spectrum antibiotic Zosyn and azithromycin  Cultures to be sent  Further changes in antibiotics based on clinical response and culture results    Patient has urine tox positive for cocaine  Denies any cocaine use  Monitor neurologically    Continue nebulizers for asthma    DVT and GI prophylaxis    Questions of patient were answered at bedside in detail  Case discussed in detail with RN, RT, and care team  Thank you for involving me in the care of this patient  I will follow with you closely during hospitalization    Time spent more than 60 minutes in direct patient care with no overlap reviewing results and records, decision making, and answering questions.       Guera Piña MD  [facility]  1/18/2022  4:00 PM

## 2022-01-18 NOTE — MED STUDENT NOTES
Pulmonology and Critical Care Progress Note    Subjective:     Chief Complaint:   Chief Complaint   Patient presents with    Altered mental status      Patient is a 48 y. o. male  overweight  gentleman with a PMH of POEMS syndrome and GI bleed. Lifetime non-smoker  Does have a history of childhood asthma  Has symptoms suggestive of sleep apnea but never diagnosed formally    We were asked to see for acute respiratory failure with hypoxia and pneumonia. On 1/16/22 Wife found patient unarousable. EMS reported he was hypoxic with SpO2 in the 50s. Chest x-ray noted opacities in the left lung base. CT head showed no acute intracranial abnormalities. Urine tox screen was positive for cocaine. ABG showed pH 7.32, pCO2 72, Bicarb 32       Patient seen and examined  Overnight as noted    Lying in bed asleep  On 4L NC   Mild distress and complains of severe headache and wanting to leave    Wife is at bedside and is concerned about remaining in ER room. Review of Systems:  Pertinent items are noted in HPI.     Current Facility-Administered Medications   Medication Dose Route Frequency Provider Last Rate Last Admin    piperacillin-tazobactam (ZOSYN) 3.375 g in 0.9% sodium chloride (MBP/ADV) 100 mL MBP  3.375 g IntraVENous Q8H Jorge Luis Dang NP 25 mL/hr at 01/18/22 0132 3.375 g at 01/18/22 0132    azithromycin (ZITHROMAX) 500 mg in 0.9% sodium chloride 250 mL (VIAL-MATE)  500 mg IntraVENous Q24H Jorge Luis Dang NP   IV Completed at 01/18/22 2772    hydrOXYzine pamoate (VISTARIL) capsule 25 mg  25 mg Oral TID PRN Jorge Luis Dang NP        albuterol-ipratropium (DUO-NEB) 2.5 MG-0.5 MG/3 ML  3 mL Nebulization QID RT Kehinde Villafana MD   3 mL at 01/18/22 0900    melatonin tablet 3 mg  3 mg Oral QHS PRN Jorge Luis Dang NP        albuterol-ipratropium (DUO-NEB) 2.5 MG-0.5 MG/3 ML  3 mL Nebulization Q6H PRN Jorge Luis Dang NP        albuterol (PROVENTIL HFA, VENTOLIN HFA, PROAIR HFA) inhaler 2 Puff  2 Puff Inhalation Q4H PRN Tiff Dang, BEV        . PHARMACY TO SUBSTITUTE PER PROTOCOL (Reordered from: methylphenidate HCl (Metadate CD) 40 mg BP30)    Per Protocol Hodan Segal MD        sodium chloride (NS) flush 5-40 mL  5-40 mL IntraVENous Q8H Hodan Segal MD   10 mL at 01/18/22 0386    sodium chloride (NS) flush 5-40 mL  5-40 mL IntraVENous PRN Margaret Almonte MD        acetaminophen (TYLENOL) tablet 650 mg  650 mg Oral Q6H PRN Margaret Almonte MD   650 mg at 01/18/22 0636    Or    acetaminophen (TYLENOL) suppository 650 mg  650 mg Rectal Q6H PRN Hodan Segal MD        polyethylene glycol (MIRALAX) packet 17 g  17 g Oral DAILY PRN Hodan Segal MD        ondansetron (ZOFRAN ODT) tablet 4 mg  4 mg Oral Q8H PRN Hodan Segal MD        Or    ondansetron (ZOFRAN) injection 4 mg  4 mg IntraVENous Q6H PRN Hodan Segal MD        fondaparinux (ARIXTRA) injection 2.5 mg  2.5 mg SubCUTAneous DAILY Hodan Segal MD   2.5 mg at 01/17/22 0835    pantoprazole (PROTONIX) tablet 40 mg  40 mg Oral ACB&D Margaret Almonte MD   40 mg at 01/17/22 6590     Current Outpatient Medications   Medication Sig Dispense Refill    pantoprazole (PROTONIX) 40 mg tablet Take 1 Tablet by mouth two (2) times a day. 180 Tablet 3    methylphenidate HCl (Metadate CD) 40 mg BP30 Take 40 mg by mouth every morning. Max Daily Amount: 40 mg. 90 Capsule 0    zolpidem (AMBIEN) 10 mg tablet Take 1 Tablet by mouth nightly as needed for Sleep. Ok to fill 11/10/2015 90 Tablet 1    sildenafil citrate (Viagra) 100 mg tablet Take 1 Tablet by mouth as needed for Erectile Dysfunction. 6 Tablet 1            Allergies   Allergen Reactions    Heparin (Bovine) Other (comments)     Questionable per patient ,back lesions. Objective:     Blood pressure 125/88, pulse 62, temperature 98 °F (36.7 °C), resp. rate 18, height 5' 11\" (1.803 m), weight 270 lb (122.5 kg), SpO2 100 %. No data recorded.       Intake and Output:  Current Shift: No intake/output data recorded. Last 3 Shifts: 01/16 1901 - 01/18 0700  In: 450 [I.V.:450]  Out: -     Physical Exam:    General: Lying in bed comfortably, no acute distress  Eye: Reactive, symmetric  Throat and Neck: Supple  Lung: Reduced air entry bilaterally with prolonged exhalation but no wheezing. Occasional crackles. Heart: S1+S2. No murmurs  Abdomen: soft, non-tender. Bowel sounds normal. No masses; obese  Extremities: No edema  : Not done  Skin: No cyanosis  Neurologic: A & O x3.   Grossly nonfocal  Psychiatric: Appropriate affect; coherent    Lab/Data Review:  Recent Results (from the past 24 hour(s))   EKG, 12 LEAD, SUBSEQUENT    Collection Time: 01/17/22 11:32 AM   Result Value Ref Range    Ventricular Rate 62 BPM    Atrial Rate 62 BPM    P-R Interval 88 ms    QRS Duration 122 ms    Q-T Interval 452 ms    QTC Calculation (Bezet) 458 ms    Calculated P Axis 28 degrees    Calculated R Axis 67 degrees    Calculated T Axis 85 degrees    Diagnosis       Sinus rhythm with short LA  Non-specific intra-ventricular conduction delay  Borderline ECG  When compared with ECG of 16-JAN-2022 19:13,  No significant change was found  Confirmed by FABRIZIO FLYNN, Cristel Slade ([id number]) on 1/17/2022 5:36:53 PM     ECHO ADULT COMPLETE    Collection Time: 01/17/22  5:14 PM   Result Value Ref Range    AV Mean Gradient 13 mmHg    AV VTI 52.9 cm    AV Mean Velocity 1.7 m/s    AV Peak Velocity 2.6 m/s    AV Peak Gradient 26 mmHg    AV Area by VTI 1.8 cm2    AV Area by Peak Velocity 1.5 cm2    Aortic Root 3.5 cm    IVSd 1.5 (A) 0.6 - 1.0 cm    LVIDd 5.4 4.2 - 5.9 cm    LVIDs 3.5 cm    LVOT Diameter 2.0 cm    LVOT Mean Gradient 3 mmHg    LVOT VTI 29.5 cm    LVOT Peak Velocity 1.3 m/s    LVOT Peak Gradient 6 mmHg    LVPWd 0.9 0.6 - 1.0 cm    LV E' Lateral Velocity 12 cm/s    LV E' Septal Velocity 7 cm/s    LVOT Area 3.1 cm2    LVOT SV 92.6 ml    LA Diameter 3.7 cm    MR Peak Velocity 1.9 m/s    MR Peak Gradient 14 mmHg    MV E Wave Deceleration Time 285.0 ms    MV A Velocity 1.23 m/s    MV E Velocity 1.25 m/s    OK Max Velocity 1.0 m/s    Pulmonary Artery EDP 4 mmHg    PV Max Velocity 1.0 m/s    PV Peak Gradient 4 mmHg    Pulm Vein A Duration 74.0 ms    Pulm Vein A Velocity 0.3 m/s    Est. RA Pressure 15 mmHg    RVIDd 4.7 cm    TR Max Velocity 2.98 m/s    TR Peak Gradient 36 mmHg    Fractional Shortening 2D 35 28 - 44 %    LVIDd Index 2.25 cm/m2    LVIDs Index 1.46 cm/m2    LV RWT Ratio 0.33     LV Mass 2D 264.4 (A) 88 - 224 g    LV Mass 2D Index 110.2 49 - 115 g/m2    MV E/A 1.02     E/E' Ratio (Averaged) 14.14     E/E' Lateral 10.42     E/E' Septal 17.86     LVOT Stroke Volume Index 38.6 mL/m2    LA Size Index 1.54 cm/m2    LA/AO Root Ratio 1.06     Ao Root Index 1.46 cm/m2    AV Velocity Ratio 0.50     LVOT:AV VTI Index 0.56     VIKI/BSA VTI 0.8 cm2/m2    VIKI/BSA Peak Velocity 0.6 cm2/m2    RVSP 51 mmHg    EF BP 65 55 - 100 %   BLOOD GAS, ARTERIAL    Collection Time: 01/17/22  6:40 PM   Result Value Ref Range    pH 7.32 (L) 7.35 - 7.45      PCO2 72 (H) 35 - 45 mmHg    PO2 137 (H) 75 - 100 mmHg    O2  >95 %    BICARBONATE 32 (H) 22 - 26 mmol/L    BASE EXCESS 8.5 (H) 0 - 2 mmol/L    O2 METHOD Room air      FIO2 21.0 %    SITE Right Radial      MIKAL'S TEST PASS     TROPONIN-HIGH SENSITIVITY    Collection Time: 01/17/22  7:26 PM   Result Value Ref Range    Troponin-High Sensitivity 149 (HH) 0 - 76 ng/L   TROPONIN-HIGH SENSITIVITY    Collection Time: 01/18/22  1:39 AM   Result Value Ref Range    Troponin-High Sensitivity 140 (HH) 0 - 76 ng/L   CBC W/O DIFF    Collection Time: 01/18/22  1:39 AM   Result Value Ref Range    WBC 11.3 (H) 4.1 - 11.1 K/uL    RBC 4.54 4.10 - 5.70 M/uL    HGB 10.2 (L) 12.1 - 17.0 g/dL    HCT 34.7 (L) 36.6 - 50.3 %    MCV 76.4 (L) 80.0 - 99.0 FL    MCH 22.5 (L) 26.0 - 34.0 PG    MCHC 29.4 (L) 30.0 - 36.5 g/dL    RDW 19.0 (H) 11.5 - 14.5 %    PLATELET 993 127 - 465 K/uL    MPV 11.7 8.9 - 12.9 FL    NRBC 0.0 0.0  WBC ABSOLUTE NRBC 0.00 0.00 - 0.95 K/uL   METABOLIC PANEL, BASIC    Collection Time: 01/18/22  1:39 AM   Result Value Ref Range    Sodium 139 136 - 145 mmol/L    Potassium 4.3 3.5 - 5.1 mmol/L    Chloride 102 97 - 108 mmol/L    CO2 36 (H) 21 - 32 mmol/L    Anion gap 1 (L) 5 - 15 mmol/L    Glucose 107 (H) 65 - 100 mg/dL    BUN 18 6 - 20 mg/dL    Creatinine 0.76 0.70 - 1.30 mg/dL    BUN/Creatinine ratio 24 (H) 12 - 20      GFR est AA >60 >60 ml/min/1.73m2    GFR est non-AA >60 >60 ml/min/1.73m2    Calcium 8.5 8.5 - 10.1 mg/dL     chest X-ray      CT HEAD WO CONT   Final Result   No acute intracranial abnormality. XR CHEST PORT   Final Result   Suspected opacities in the left lung base are nonspecific but could represent   pneumonia in the appropriate clinical setting. Short interval follow-up is   recommended to ensure resolution and exclude other causes of opacification. CT Results  (Last 48 hours)               01/16/22 2140  CT HEAD WO CONT Final result    Impression:  No acute intracranial abnormality. Narrative:  Study: Head CT without contrast.       Clinical indication: Altered mental status. Technique: Routine volume acquisition of the head was obtained without IV   contrast. Coronal and sagittal reformations were generated in soft tissue and   bone kernels. Dose reduction: All CT scans at this facility are performed using   dose reduction optimization techniques as appropriate to a performed exam   including the following: Automated exposure control, adjustments of the mA   and/or kV according to patient size, or use of iterative reconstruction   technique. Comparison: None available. Findings:        No evidence of acute intracranial hemorrhage, mass effect, midline shift or   abnormal extra-axial fluid collection. Ventricles and basal cisterns are   preserved and are symmetric. Gray-white matter differentiation is maintained. No evidence of skull fracture.  Visualized paranasal sinuses and mastoid air   cells are clear. Globes and orbits are intact. Assessment:     1. Acute respiratory failure with hypoxia  2. Left lower lobe pneumonia  3. Altered mental status/hypersomnia  4. Cocaine abuse  5. POEMS syndrome  6. Snoring/apnea  7. Asthma    Plan:     Patient seen in the ED  Patient sleepy and agitated    Currently on 4 L nasal cannula oxygen, Patient has significant hypercapnia at 72, will switch to BiPAP start at 12/6, rate 16, FiO2 30%  ABG showed pH 7.32, pCO2 72, Bicarb 32  Patient's has features of sleep apnea which could be indicative of obesity hypoventilation syndrome    Will use supplemental oxygen as needed to keep saturation above 92%     Left lower lobe pneumonia on chest x-ray  Continue broad-spectrum antibiotic Zosyn and azithromycin  Blood cultures pending  Further changes in antibiotics based on clinical response and culture results     Patient has urine tox positive for cocaine  Denies any cocaine use  Monitor neurologically     Continue nebulizers for asthma     DVT and GI prophylaxis       Yanet Ehcevarria  [facility]  1/18/2022  9:24 AM  *ATTENTION:  This note has been created by a medical student for educational purposes only. Please do not refer to the content of this note for clinical decision-making, billing, or other purposes. Please see attending physicians note to obtain clinical information on this patient. *

## 2022-01-18 NOTE — PROGRESS NOTES
1/18/22. Pt remains in ED as an admission. D/C Plan remains home with wife & she will transport pt home upon discharge.

## 2022-01-18 NOTE — PROGRESS NOTES
Hospitalist Progress Note    Subjective:   Daily Progress Note: 1/18/2022 10:29 AM    Hospital Course: Patient is a  77-year-old male with a history of GI bleed PIEMS syndrome that presented to the ED on 1/16/2022 for altered mental status. He had been feeling more fatigued and trouble staying awake for 1 day prior. On EMS arrival patient was found to be hypoxic with oxygen saturations in the 50s. He was placed on a nonrebreather. He denied any chest pain, palpitations, shortness of breath. He did have a productive cough. In the ED rapid COVID-negative. Chest x-ray showed signs concerning for pneumonia. CT of the head showed no acute intracranial abnormality. Troponins indeterminately elevated. Patient was placed on IV Zosyn. Pulmonology and cardiology consulted. 2D echo showed left ventricular size normal, mild septal thickening, normal wall motion, EF of 60 to 30%, normal diastolic function. Patient weaned down to 3 L of oxygen nasal cannula      Subjective: Patient states that he has a headache that has not been resolved with Tylenol. Admits to a yellow to green productive cough. Some shortness of breath. Denies any chest pain. Wife at bedside    Current Facility-Administered Medications   Medication Dose Route Frequency    piperacillin-tazobactam (ZOSYN) 3.375 g in 0.9% sodium chloride (MBP/ADV) 100 mL MBP  3.375 g IntraVENous Q8H    azithromycin (ZITHROMAX) 500 mg in 0.9% sodium chloride 250 mL (VIAL-MATE)  500 mg IntraVENous Q24H    hydrOXYzine pamoate (VISTARIL) capsule 25 mg  25 mg Oral TID PRN    albuterol-ipratropium (DUO-NEB) 2.5 MG-0.5 MG/3 ML  3 mL Nebulization QID RT    melatonin tablet 3 mg  3 mg Oral QHS PRN    albuterol-ipratropium (DUO-NEB) 2.5 MG-0.5 MG/3 ML  3 mL Nebulization Q6H PRN    albuterol (PROVENTIL HFA, VENTOLIN HFA, PROAIR HFA) inhaler 2 Puff  2 Puff Inhalation Q4H PRN    . PHARMACY TO SUBSTITUTE PER PROTOCOL (Reordered from: methylphenidate HCl (Metadate CD) 40 mg BP30)    Per Protocol    sodium chloride (NS) flush 5-40 mL  5-40 mL IntraVENous Q8H    sodium chloride (NS) flush 5-40 mL  5-40 mL IntraVENous PRN    acetaminophen (TYLENOL) tablet 650 mg  650 mg Oral Q6H PRN    Or    acetaminophen (TYLENOL) suppository 650 mg  650 mg Rectal Q6H PRN    polyethylene glycol (MIRALAX) packet 17 g  17 g Oral DAILY PRN    ondansetron (ZOFRAN ODT) tablet 4 mg  4 mg Oral Q8H PRN    Or    ondansetron (ZOFRAN) injection 4 mg  4 mg IntraVENous Q6H PRN    fondaparinux (ARIXTRA) injection 2.5 mg  2.5 mg SubCUTAneous DAILY    pantoprazole (PROTONIX) tablet 40 mg  40 mg Oral ACB&D     Current Outpatient Medications   Medication Sig    pantoprazole (PROTONIX) 40 mg tablet Take 1 Tablet by mouth two (2) times a day.  methylphenidate HCl (Metadate CD) 40 mg BP30 Take 40 mg by mouth every morning. Max Daily Amount: 40 mg.    zolpidem (AMBIEN) 10 mg tablet Take 1 Tablet by mouth nightly as needed for Sleep. Ok to fill 11/10/2015    sildenafil citrate (Viagra) 100 mg tablet Take 1 Tablet by mouth as needed for Erectile Dysfunction.         Review of Systems  Constitutional: No fevers, No chills, No sweats, ++ fatigue, ++ Weakness  Eyes: No redness  Ears, nose, mouth, throat, and face: No nasal congestion, No sore throat, No voice change  Respiratory: No Shortness of Breath, ++ cough, No wheezing  Cardiovascular: No chest pain, No palpitations, No extremity edema  Gastrointestinal: No nausea, No vomiting, No diarrhea, No abdominal pain  Genitourinary: No frequency, No dysuria, No hematuria  Integument/breast: No skin lesion(s)   Neurological: No Confusion, No headaches, No dizziness      Objective:     Visit Vitals  BP (!) 155/92 (BP 1 Location: Left upper arm, BP Patient Position: At rest)   Pulse 69   Temp 98 °F (36.7 °C)   Resp 16   Ht 5' 11\" (1.803 m)   Wt 122.5 kg (270 lb)   SpO2 98%   BMI 37.66 kg/m²    O2 Flow Rate (L/min): 3 l/min O2 Device: Nasal cannula    No data recorded. No intake/output data recorded. 01/16 1901 - 01/18 0700  In: 450 [I.V.:450]  Out: -     PHYSICAL EXAM:  Constitutional: No acute distress  Skin: Extremities and face reveal no rashes. HEENT: Sclerae anicteric. Extra-occular muscles are intact. No oral ulcers. The neck is supple and no masses. Cardiovascular: Regular rate and rhythm. Respiratory:  Nonlabored, diminished  GI: Abdomen nondistended, soft, and nontender. Normal active bowel sounds. Musculoskeletal: No pitting edema of the lower legs. Able to move all ext  Neurological:  Lethargic Patient is alert and oriented.  Cranial nerves II-XII grossly intact  Psychiatric: Mood appears appropriate       Data Review    Recent Results (from the past 24 hour(s))   EKG, 12 LEAD, SUBSEQUENT    Collection Time: 01/17/22 11:32 AM   Result Value Ref Range    Ventricular Rate 62 BPM    Atrial Rate 62 BPM    P-R Interval 88 ms    QRS Duration 122 ms    Q-T Interval 452 ms    QTC Calculation (Bezet) 458 ms    Calculated P Axis 28 degrees    Calculated R Axis 67 degrees    Calculated T Axis 85 degrees    Diagnosis       Sinus rhythm with short MI  Non-specific intra-ventricular conduction delay  Borderline ECG  When compared with ECG of 16-JAN-2022 19:13,  No significant change was found  Confirmed by FABRIZIO FLYNN, Ry Zhong ([id number]) on 1/17/2022 5:36:53 PM     ECHO ADULT COMPLETE    Collection Time: 01/17/22  5:14 PM   Result Value Ref Range    AV Mean Gradient 13 mmHg    AV VTI 52.9 cm    AV Mean Velocity 1.7 m/s    AV Peak Velocity 2.6 m/s    AV Peak Gradient 26 mmHg    AV Area by VTI 1.8 cm2    AV Area by Peak Velocity 1.5 cm2    Aortic Root 3.5 cm    IVSd 1.5 (A) 0.6 - 1.0 cm    LVIDd 5.4 4.2 - 5.9 cm    LVIDs 3.5 cm    LVOT Diameter 2.0 cm    LVOT Mean Gradient 3 mmHg    LVOT VTI 29.5 cm    LVOT Peak Velocity 1.3 m/s    LVOT Peak Gradient 6 mmHg    LVPWd 0.9 0.6 - 1.0 cm    LV E' Lateral Velocity 12 cm/s    LV E' Septal Velocity 7 cm/s    LVOT Area 3.1 cm2 LVOT SV 92.6 ml    LA Diameter 3.7 cm    MR Peak Velocity 1.9 m/s    MR Peak Gradient 14 mmHg    MV E Wave Deceleration Time 285.0 ms    MV A Velocity 1.23 m/s    MV E Velocity 1.25 m/s    ID Max Velocity 1.0 m/s    Pulmonary Artery EDP 4 mmHg    PV Max Velocity 1.0 m/s    PV Peak Gradient 4 mmHg    Pulm Vein A Duration 74.0 ms    Pulm Vein A Velocity 0.3 m/s    Est. RA Pressure 15 mmHg    RVIDd 4.7 cm    TR Max Velocity 2.98 m/s    TR Peak Gradient 36 mmHg    Fractional Shortening 2D 35 28 - 44 %    LVIDd Index 2.25 cm/m2    LVIDs Index 1.46 cm/m2    LV RWT Ratio 0.33     LV Mass 2D 264.4 (A) 88 - 224 g    LV Mass 2D Index 110.2 49 - 115 g/m2    MV E/A 1.02     E/E' Ratio (Averaged) 14.14     E/E' Lateral 10.42     E/E' Septal 17.86     LVOT Stroke Volume Index 38.6 mL/m2    LA Size Index 1.54 cm/m2    LA/AO Root Ratio 1.06     Ao Root Index 1.46 cm/m2    AV Velocity Ratio 0.50     LVOT:AV VTI Index 0.56     VIKI/BSA VTI 0.8 cm2/m2    VIKI/BSA Peak Velocity 0.6 cm2/m2    RVSP 51 mmHg    EF BP 65 55 - 100 %   BLOOD GAS, ARTERIAL    Collection Time: 01/17/22  6:40 PM   Result Value Ref Range    pH 7.32 (L) 7.35 - 7.45      PCO2 72 (H) 35 - 45 mmHg    PO2 137 (H) 75 - 100 mmHg    O2  >95 %    BICARBONATE 32 (H) 22 - 26 mmol/L    BASE EXCESS 8.5 (H) 0 - 2 mmol/L    O2 METHOD Room air      FIO2 21.0 %    SITE Right Radial      MIKAL'S TEST PASS     TROPONIN-HIGH SENSITIVITY    Collection Time: 01/17/22  7:26 PM   Result Value Ref Range    Troponin-High Sensitivity 149 (HH) 0 - 76 ng/L   TROPONIN-HIGH SENSITIVITY    Collection Time: 01/18/22  1:39 AM   Result Value Ref Range    Troponin-High Sensitivity 140 (HH) 0 - 76 ng/L   CBC W/O DIFF    Collection Time: 01/18/22  1:39 AM   Result Value Ref Range    WBC 11.3 (H) 4.1 - 11.1 K/uL    RBC 4.54 4.10 - 5.70 M/uL    HGB 10.2 (L) 12.1 - 17.0 g/dL    HCT 34.7 (L) 36.6 - 50.3 %    MCV 76.4 (L) 80.0 - 99.0 FL    MCH 22.5 (L) 26.0 - 34.0 PG    MCHC 29.4 (L) 30.0 - 36.5 g/dL RDW 19.0 (H) 11.5 - 14.5 %    PLATELET 751 625 - 554 K/uL    MPV 11.7 8.9 - 12.9 FL    NRBC 0.0 0.0  WBC    ABSOLUTE NRBC 0.00 0.00 - 1.38 K/uL   METABOLIC PANEL, BASIC    Collection Time: 01/18/22  1:39 AM   Result Value Ref Range    Sodium 139 136 - 145 mmol/L    Potassium 4.3 3.5 - 5.1 mmol/L    Chloride 102 97 - 108 mmol/L    CO2 36 (H) 21 - 32 mmol/L    Anion gap 1 (L) 5 - 15 mmol/L    Glucose 107 (H) 65 - 100 mg/dL    BUN 18 6 - 20 mg/dL    Creatinine 0.76 0.70 - 1.30 mg/dL    BUN/Creatinine ratio 24 (H) 12 - 20      GFR est AA >60 >60 ml/min/1.73m2    GFR est non-AA >60 >60 ml/min/1.73m2    Calcium 8.5 8.5 - 10.1 mg/dL       Radiology review: CT of head, ECHO, chest xray    Assessment:   1. Septic encephalopathy  2. Acute hypoxic respiratory failure secondary to community-acquired pneumonia  3. Non-STEMI type II    Plan:   1.  CT of the head unremarkable. Patient is more awake and alert. Admits to a headache. We will add on Fioricet  2. Patient currently on 3 L oxygen nasal cannula. We will continue to wean. Chest x-ray shows signs concerning for pneumonia. Pulmonology consulted. ABG reviewed. On IV Zosyn and Azithromycin. 3.  Troponins indeterminately elevated. Chest pain free. 2D echo shows normal EF systolic and diastolic function. Cardiology consulted. 4.  CBC and BMP in the AM      Dispo: 24-48 hours. Suspect home with no needs. Barriers include improvement in sepsis, weaning of oxygen, clearance from consulting doctors    CODE STATUS Full     DVT prophylaxis: Arixtra  Ulcer prophylaxis: [address] discussed with: Patient/Family, Nurse and     Total time spent with patient: 34 minutes.

## 2022-01-18 NOTE — PROGRESS NOTES
Progress Note      1/18/2022 12:17 PM  NAME: Johan Escobedo   MRN:  [id number]   Admit Diagnosis: NSTEMI (non-ST elevated myocardial infarction) ([facility] [I21.4]      Subjective:   Chart reviewed. Discussed with the patient and with the . Patient is somewhat more awake and alert but still somewhat somnolent. History of neuropathy. Echocardiograph results explained. Review of Systems:    Symptom Y/N Comments  Symptom Y/N Comments   Fever/Chills n   Chest Pain n    Poor Appetite    Edema     Cough    Abdominal Pain n    Sputum    Joint Pain     SOB/VERGARA n   Pruritis/Rash     Nausea/vomit    Other y  hypersomnolence. Diarrhea         Constipation           Could NOT obtain due to:      Objective:        Physical Exam:    Last 24hrs VS reviewed since prior progress note. Most recent are:    Visit Vitals  BP (!) 155/92 (BP 1 Location: Left upper arm, BP Patient Position: At rest)   Pulse 69   Temp 98 °F (36.7 °C)   Resp 16   Ht 5' 11\" (1.803 m)   Wt 122.5 kg (270 lb)   SpO2 98%   BMI 37.66 kg/m²       Intake/Output Summary (Last 24 hours) at 1/18/2022 1217  Last data filed at 1/17/2022 1835  Gross per 24 hour   Intake 100 ml   Output --   Net 100 ml        General Appearance: Well developed, well nourished, alert & oriented x 3,    no acute distress. Ears/Nose/Mouth/Throat: Hearing grossly normal.  Neck: Supple. Chest: Lungs clear to auscultation bilaterally. Cardiovascular: Regular rate and rhythm, S1,S2 normal, no murmur. Abdomen: Soft, non-tender, bowel sounds are active. Extremities: No edema bilaterally. Skin: Warm and dry. []         Post-cath site without hematoma, bruit, tenderness, or thrill. Distal pulses intact.     PMH/SH reviewed - no change compared to H&P    Data Review    Telemetry: normal sinus rhythm     EKG:   []  No new EKG for review    Lab Data Personally Reviewed:    Recent Labs     01/18/22  0139 01/17/22  0403   WBC 11.3* 13.7*   HGB 10.2* 10.3*   HCT 34.7* 35.4*   PLT 173 168     No results for input(s): INR, PTP, APTT, INREXT in the last 72 hours. Recent Labs     01/18/22  0139 01/17/22  0403 01/16/22  1915    137 137   K 4.3 4.3 5.1    101 102   CO2 36* 34* 32   BUN 18 26* 28*   CREA 0.76 0.71 0.86   * 118* 159*   CA 8.5 8.7 8.7     No results for input(s): CPK, CKNDX, TROIQ in the last 72 hours. No lab exists for component: CPKMB  Lab Results   Component Value Date/Time    Cholesterol, total 163 10/21/2013 08:51 AM    HDL Cholesterol 55 10/21/2013 08:51 AM    LDL, calculated 94 10/21/2013 08:51 AM    Triglyceride 71 10/21/2013 08:51 AM    CHOL/HDL Ratio 5.0 03/03/2009 02:37 PM       Recent Labs     01/16/22  1915   AP 96   TP 6.7   ALB 2.8*   GLOB 3.9     Recent Labs     01/17/22  1840   PH 7.32*   PCO2 72*   PO2 137*       Medications Personally Reviewed:    Current Facility-Administered Medications   Medication Dose Route Frequency    butalbital-acetaminophen-caffeine (FIORICET, ESGIC) -40 mg per tablet 1 Tablet  1 Tablet Oral Q4H PRN    piperacillin-tazobactam (ZOSYN) 3.375 g in 0.9% sodium chloride (MBP/ADV) 100 mL MBP  3.375 g IntraVENous Q8H    azithromycin (ZITHROMAX) 500 mg in 0.9% sodium chloride 250 mL (VIAL-MATE)  500 mg IntraVENous Q24H    hydrOXYzine pamoate (VISTARIL) capsule 25 mg  25 mg Oral TID PRN    albuterol-ipratropium (DUO-NEB) 2.5 MG-0.5 MG/3 ML  3 mL Nebulization QID RT    melatonin tablet 3 mg  3 mg Oral QHS PRN    albuterol-ipratropium (DUO-NEB) 2.5 MG-0.5 MG/3 ML  3 mL Nebulization Q6H PRN    albuterol (PROVENTIL HFA, VENTOLIN HFA, PROAIR HFA) inhaler 2 Puff  2 Puff Inhalation Q4H PRN    . PHARMACY TO SUBSTITUTE PER PROTOCOL (Reordered from: methylphenidate HCl (Metadate CD) 40 mg BP30)    Per Protocol    sodium chloride (NS) flush 5-40 mL  5-40 mL IntraVENous Q8H    sodium chloride (NS) flush 5-40 mL  5-40 mL IntraVENous PRN    acetaminophen (TYLENOL) tablet 650 mg  650 mg Oral Q6H PRN    Or    acetaminophen (TYLENOL) suppository 650 mg  650 mg Rectal Q6H PRN    polyethylene glycol (MIRALAX) packet 17 g  17 g Oral DAILY PRN    ondansetron (ZOFRAN ODT) tablet 4 mg  4 mg Oral Q8H PRN    Or    ondansetron (ZOFRAN) injection 4 mg  4 mg IntraVENous Q6H PRN    fondaparinux (ARIXTRA) injection 2.5 mg  2.5 mg SubCUTAneous DAILY    pantoprazole (PROTONIX) tablet 40 mg  40 mg Oral ACB&D     Current Outpatient Medications   Medication Sig    pantoprazole (PROTONIX) 40 mg tablet Take 1 Tablet by mouth two (2) times a day.  methylphenidate HCl (Metadate CD) 40 mg BP30 Take 40 mg by mouth every morning. Max Daily Amount: 40 mg.    zolpidem (AMBIEN) 10 mg tablet Take 1 Tablet by mouth nightly as needed for Sleep. Ok to fill 11/10/2015    sildenafil citrate (Viagra) 100 mg tablet Take 1 Tablet by mouth as needed for Erectile Dysfunction. Problem List:   Patient is still hypersomnolent though when awake is much more alert and oriented. Troponin I is indeterminate and I do not think this is a presentation of acute coronary syndrome. POEM syndrome. His ejection fraction is normal.  Blood pressure is somewhat high. 1.      Assessment/Plan:   From cardiac viewpoint no further recommendations. Will adjust blood pressure medications. Discussed with the patient's wife. Thank you. 1.          []       High complexity decision making was performed in this patient at high risk for decompensation with multiple organ involvement.     Loree Evans MD

## 2022-01-19 ENCOUNTER — APPOINTMENT (OUTPATIENT)
Dept: GENERAL RADIOLOGY | Age: 57
DRG: 193 | End: 2022-01-19
Attending: INTERNAL MEDICINE
Payer: COMMERCIAL

## 2022-01-19 LAB
ANION GAP SERPL CALC-SCNC: 3 MMOL/L (ref 5–15)
ARTERIAL PATENCY WRIST A: ABNORMAL
ATRIAL RATE: 63 BPM
ATRIAL RATE: 66 BPM
BASE EXCESS BLDA CALC-SCNC: 8.7 MMOL/L (ref 0–2)
BASOPHILS # BLD: 0.1 K/UL (ref 0–0.1)
BASOPHILS NFR BLD: 1 % (ref 0–1)
BDY SITE: ABNORMAL
BUN SERPL-MCNC: 16 MG/DL (ref 6–20)
BUN/CREAT SERPL: 26 (ref 12–20)
CA-I BLD-MCNC: 8.5 MG/DL (ref 8.5–10.1)
CALCULATED P AXIS, ECG09: 20 DEGREES
CALCULATED P AXIS, ECG09: 56 DEGREES
CALCULATED R AXIS, ECG10: 57 DEGREES
CALCULATED R AXIS, ECG10: 68 DEGREES
CALCULATED T AXIS, ECG11: 82 DEGREES
CALCULATED T AXIS, ECG11: 84 DEGREES
CHLORIDE SERPL-SCNC: 104 MMOL/L (ref 97–108)
CO2 SERPL-SCNC: 34 MMOL/L (ref 21–32)
CREAT SERPL-MCNC: 0.62 MG/DL (ref 0.7–1.3)
DIAGNOSIS, 93000: NORMAL
DIAGNOSIS, 93000: NORMAL
DIFFERENTIAL METHOD BLD: ABNORMAL
EOSINOPHIL # BLD: 0.4 K/UL (ref 0–0.4)
EOSINOPHIL NFR BLD: 5 % (ref 0–7)
ERYTHROCYTE [DISTWIDTH] IN BLOOD BY AUTOMATED COUNT: 19.4 % (ref 11.5–14.5)
FIO2 ON VENT: 21 %
GLUCOSE SERPL-MCNC: 112 MG/DL (ref 65–100)
HCO3 BLDA-SCNC: 32 MMOL/L (ref 22–26)
HCT VFR BLD AUTO: 37.6 % (ref 36.6–50.3)
HGB BLD-MCNC: 11.2 G/DL (ref 12.1–17)
IMM GRANULOCYTES # BLD AUTO: 0 K/UL (ref 0–0.04)
IMM GRANULOCYTES NFR BLD AUTO: 1 % (ref 0–0.5)
LYMPHOCYTES # BLD: 2 K/UL (ref 0.8–3.5)
LYMPHOCYTES NFR BLD: 24 % (ref 12–49)
MCH RBC QN AUTO: 22.2 PG (ref 26–34)
MCHC RBC AUTO-ENTMCNC: 29.8 G/DL (ref 30–36.5)
MCV RBC AUTO: 74.6 FL (ref 80–99)
MONOCYTES # BLD: 0.9 K/UL (ref 0–1)
MONOCYTES NFR BLD: 11 % (ref 5–13)
NEUTS SEG # BLD: 4.9 K/UL (ref 1.8–8)
NEUTS SEG NFR BLD: 58 % (ref 32–75)
NRBC # BLD: 0 K/UL (ref 0–0.01)
NRBC BLD-RTO: 0 PER 100 WBC
P-R INTERVAL, ECG05: 136 MS
P-R INTERVAL, ECG05: 94 MS
PCO2 BLDA: 48 MMHG (ref 35–45)
PH BLDA: 7.46 [PH] (ref 7.35–7.45)
PLATELET # BLD AUTO: 198 K/UL (ref 150–400)
PMV BLD AUTO: 10.9 FL (ref 8.9–12.9)
PO2 BLDA: 73 MMHG (ref 75–100)
POTASSIUM SERPL-SCNC: 3.9 MMOL/L (ref 3.5–5.1)
Q-T INTERVAL, ECG07: 442 MS
Q-T INTERVAL, ECG07: 444 MS
QRS DURATION, ECG06: 114 MS
QRS DURATION, ECG06: 122 MS
QTC CALCULATION (BEZET), ECG08: 454 MS
QTC CALCULATION (BEZET), ECG08: 463 MS
RBC # BLD AUTO: 5.04 M/UL (ref 4.1–5.7)
SAO2 % BLD: 96 %
SAO2% DEVICE SAO2% SENSOR NAME: ABNORMAL
SODIUM SERPL-SCNC: 141 MMOL/L (ref 136–145)
SPECIMEN SITE: ABNORMAL
VENTRICULAR RATE, ECG03: 63 BPM
VENTRICULAR RATE, ECG03: 66 BPM
WBC # BLD AUTO: 8.4 K/UL (ref 4.1–11.1)

## 2022-01-19 PROCEDURE — 71045 X-RAY EXAM CHEST 1 VIEW: CPT

## 2022-01-19 PROCEDURE — 80048 BASIC METABOLIC PNL TOTAL CA: CPT

## 2022-01-19 PROCEDURE — 74011250637 HC RX REV CODE- 250/637: Performed by: INTERNAL MEDICINE

## 2022-01-19 PROCEDURE — 36415 COLL VENOUS BLD VENIPUNCTURE: CPT

## 2022-01-19 PROCEDURE — 74011250636 HC RX REV CODE- 250/636: Performed by: NURSE PRACTITIONER

## 2022-01-19 PROCEDURE — 85025 COMPLETE CBC W/AUTO DIFF WBC: CPT

## 2022-01-19 PROCEDURE — 74011000258 HC RX REV CODE- 258: Performed by: NURSE PRACTITIONER

## 2022-01-19 PROCEDURE — 74011250636 HC RX REV CODE- 250/636: Performed by: INTERNAL MEDICINE

## 2022-01-19 PROCEDURE — 82803 BLOOD GASES ANY COMBINATION: CPT

## 2022-01-19 PROCEDURE — 74011000250 HC RX REV CODE- 250: Performed by: INTERNAL MEDICINE

## 2022-01-19 PROCEDURE — 36600 WITHDRAWAL OF ARTERIAL BLOOD: CPT

## 2022-01-19 PROCEDURE — 65270000029 HC RM PRIVATE

## 2022-01-19 RX ORDER — LOSARTAN POTASSIUM 50 MG/1
100 TABLET ORAL DAILY
Status: DISCONTINUED | OUTPATIENT
Start: 2022-01-19 | End: 2022-01-20 | Stop reason: HOSPADM

## 2022-01-19 RX ORDER — PANTOPRAZOLE SODIUM 40 MG/1
40 TABLET, DELAYED RELEASE ORAL
Status: DISCONTINUED | OUTPATIENT
Start: 2022-01-20 | End: 2022-01-20 | Stop reason: HOSPADM

## 2022-01-19 RX ADMIN — SODIUM CHLORIDE, PRESERVATIVE FREE 10 ML: 5 INJECTION INTRAVENOUS at 02:03

## 2022-01-19 RX ADMIN — PANTOPRAZOLE SODIUM 40 MG: 40 TABLET, DELAYED RELEASE ORAL at 09:38

## 2022-01-19 RX ADMIN — PIPERACILLIN AND TAZOBACTAM 3.38 G: 3; .375 INJECTION, POWDER, LYOPHILIZED, FOR SOLUTION INTRAVENOUS at 02:01

## 2022-01-19 RX ADMIN — SODIUM CHLORIDE, PRESERVATIVE FREE 10 ML: 5 INJECTION INTRAVENOUS at 21:48

## 2022-01-19 RX ADMIN — PIPERACILLIN AND TAZOBACTAM 3.38 G: 3; .375 INJECTION, POWDER, LYOPHILIZED, FOR SOLUTION INTRAVENOUS at 17:24

## 2022-01-19 RX ADMIN — LOSARTAN POTASSIUM 100 MG: 50 TABLET, FILM COATED ORAL at 12:29

## 2022-01-19 RX ADMIN — AMLODIPINE BESYLATE 5 MG: 5 TABLET ORAL at 09:38

## 2022-01-19 RX ADMIN — SODIUM CHLORIDE, PRESERVATIVE FREE 10 ML: 5 INJECTION INTRAVENOUS at 13:15

## 2022-01-19 RX ADMIN — AZITHROMYCIN MONOHYDRATE 500 MG: 500 INJECTION, POWDER, LYOPHILIZED, FOR SOLUTION INTRAVENOUS at 15:58

## 2022-01-19 RX ADMIN — FONDAPARINUX SODIUM 2.5 MG: 2.5 INJECTION, SOLUTION SUBCUTANEOUS at 11:20

## 2022-01-19 RX ADMIN — PIPERACILLIN AND TAZOBACTAM 3.38 G: 3; .375 INJECTION, POWDER, LYOPHILIZED, FOR SOLUTION INTRAVENOUS at 09:38

## 2022-01-19 NOTE — PROGRESS NOTES
Progress Note      1/19/2022 12:17 PM  NAME: Johan Escobedo   MRN:  [id number]   Admit Diagnosis: NSTEMI (non-ST elevated myocardial infarction) ([facility] 75.) [I21.4]      Subjective:   Chart reviewed. Discussed with the patient and with the wife. Patient is somewhat more awake and alert but still somewhat somnolent. History of neuropathy. Echocardiograph results explained. Review of Systems:    Symptom Y/N Comments  Symptom Y/N Comments   Fever/Chills n   Chest Pain n    Poor Appetite    Edema     Cough    Abdominal Pain n    Sputum    Joint Pain     SOB/VERGARA n   Pruritis/Rash     Nausea/vomit    Other y  hypersomnolence. Diarrhea         Constipation           Could NOT obtain due to:      Objective:        Physical Exam:    Last 24hrs VS reviewed since prior progress note. Most recent are:    Visit Vitals  BP (!) 169/83 (BP Patient Position: At rest)   Pulse 70   Temp 97.9 °F (36.6 °C)   Resp 18   Ht 5' 11\" (1.803 m)   Wt 122.5 kg (270 lb)   SpO2 93%   BMI 37.66 kg/m²     No intake or output data in the 24 hours ending 01/19/22 1004     General Appearance: Well developed, well nourished, alert & oriented x 3,    no acute distress. Ears/Nose/Mouth/Throat: Hearing grossly normal.  Neck: Supple. Chest: Lungs clear to auscultation bilaterally. Cardiovascular: Regular rate and rhythm, S1,S2 normal, no murmur. Abdomen: Soft, non-tender, bowel sounds are active. Extremities: No edema bilaterally. Skin: Warm and dry. []         Post-cath site without hematoma, bruit, tenderness, or thrill. Distal pulses intact. PMH/SH reviewed - no change compared to H&P    Data Review    Telemetry: normal sinus rhythm     EKG:   []  No new EKG for review    Lab Data Personally Reviewed:    Recent Labs     01/19/22 0220 01/18/22  0139   WBC 8.4 11.3*   HGB 11.2* 10.2*   HCT 37.6 34.7*    173     No results for input(s): INR, PTP, APTT, INREXT, INREXT in the last 72 hours.    Recent Labs     01/19/22 0220 01/18/22  0139 01/17/22  0403    139 137   K 3.9 4.3 4.3    102 101   CO2 34* 36* 34*   BUN 16 18 26*   CREA 0.62* 0.76 0.71   * 107* 118*   CA 8.5 8.5 8.7     No results for input(s): CPK, CKNDX, TROIQ in the last 72 hours. No lab exists for component: CPKMB  Lab Results   Component Value Date/Time    Cholesterol, total 163 10/21/2013 08:51 AM    HDL Cholesterol 55 10/21/2013 08:51 AM    LDL, calculated 94 10/21/2013 08:51 AM    Triglyceride 71 10/21/2013 08:51 AM    CHOL/HDL Ratio 5.0 03/03/2009 02:37 PM       Recent Labs     01/16/22  1915   AP 96   TP 6.7   ALB 2.8*   GLOB 3.9     Recent Labs     01/17/22  1840   PH 7.32*   PCO2 72*   PO2 137*       Medications Personally Reviewed:    Current Facility-Administered Medications   Medication Dose Route Frequency    butalbital-acetaminophen-caffeine (FIORICET, ESGIC) -40 mg per tablet 1 Tablet  1 Tablet Oral Q4H PRN    amLODIPine (NORVASC) tablet 5 mg  5 mg Oral DAILY    piperacillin-tazobactam (ZOSYN) 3.375 g in 0.9% sodium chloride (MBP/ADV) 100 mL MBP  3.375 g IntraVENous Q8H    azithromycin (ZITHROMAX) 500 mg in 0.9% sodium chloride 250 mL (VIAL-MATE)  500 mg IntraVENous Q24H    hydrOXYzine pamoate (VISTARIL) capsule 25 mg  25 mg Oral TID PRN    melatonin tablet 3 mg  3 mg Oral QHS PRN    albuterol-ipratropium (DUO-NEB) 2.5 MG-0.5 MG/3 ML  3 mL Nebulization Q6H PRN    albuterol (PROVENTIL HFA, VENTOLIN HFA, PROAIR HFA) inhaler 2 Puff  2 Puff Inhalation Q4H PRN    . PHARMACY TO SUBSTITUTE PER PROTOCOL (Reordered from: methylphenidate HCl (Metadate CD) 40 mg BP30)    Per Protocol    sodium chloride (NS) flush 5-40 mL  5-40 mL IntraVENous Q8H    sodium chloride (NS) flush 5-40 mL  5-40 mL IntraVENous PRN    acetaminophen (TYLENOL) tablet 650 mg  650 mg Oral Q6H PRN    Or    acetaminophen (TYLENOL) suppository 650 mg  650 mg Rectal Q6H PRN    polyethylene glycol (MIRALAX) packet 17 g  17 g Oral DAILY PRN    ondansetron (ZOFRAN ODT) tablet 4 mg  4 mg Oral Q8H PRN    Or    ondansetron (ZOFRAN) injection 4 mg  4 mg IntraVENous Q6H PRN    fondaparinux (ARIXTRA) injection 2.5 mg  2.5 mg SubCUTAneous DAILY    pantoprazole (PROTONIX) tablet 40 mg  40 mg Oral ACB&D           Problem List:   Patient is much more alert and oriented. Troponin I is indeterminate and I do not think this is a presentation of acute coronary syndrome. POEM syndrome. His ejection fraction is normal.  Blood pressure is somewhat high. 1.      Assessment/Plan:   From cardiac viewpoint no further recommendations. Will adjust blood pressure medications. Discussed with the patient's wife. Thank you. 1.          []       High complexity decision making was performed in this patient at high risk for decompensation with multiple organ involvement.     Rod Clement MD

## 2022-01-19 NOTE — PROGRESS NOTES
Chart reviewed, DCP remains for patient to d/c to home self care once medically stable. CM continues to follow and monitor for needs.

## 2022-01-19 NOTE — MED STUDENT NOTES
Pulmonology and Critical Care Progress Note    Subjective:     Chief Complaint:   Chief Complaint   Patient presents with    Altered mental status     We were asked to see for acute respiratory failure with hypoxia and pneumonia. Patient is a 48 y. o. male  overweight  gentleman with a PMH of POEMS syndrome and GI bleed. Lifetime non-smoker  Does have a history of childhood asthma  Has symptoms suggestive of sleep apnea but never diagnosed formally     On 1/16/22 Wife found patient unarousable. EMS reported he was hypoxic with SpO2 in the 50s. Chest x-ray noted opacities in the left lung base. CT head showed no acute intracranial abnormalities. Urine tox screen was positive for cocaine. Patient seen and examined  Overnight as noted    Sitting in bed comfortably  Awake and alert  On room air  Gradual improvement in respiratory status  No acute distress    Patient was to begin BiPAP yesterday due to hypercapnia   ABG showed pH 7.32, pCO2 72, Bicarb 32    Patient states that he has not been on supplemental nasal cannula since late last night and has not received BiPAP treatment. States he feels fine and denies shortness of breath, wheezing, headache, and chest pain. Review of Systems:  Pertinent items are noted in HPI.     Current Facility-Administered Medications   Medication Dose Route Frequency Provider Last Rate Last Admin    butalbital-acetaminophen-caffeine (FIORICET, ESGIC) -40 mg per tablet 1 Tablet  1 Tablet Oral Q4H PRN Rosa Ramirez PA-C   1 Tablet at 01/18/22 1315    amLODIPine (NORVASC) tablet 5 mg  5 mg Oral DAILY Francisco Javier Rivera MD   5 mg at 01/18/22 1548    piperacillin-tazobactam (ZOSYN) 3.375 g in 0.9% sodium chloride (MBP/ADV) 100 mL MBP  3.375 g IntraVENous Q8H Jorge Luis Dang NP 25 mL/hr at 01/19/22 0201 3.375 g at 01/19/22 0201    azithromycin (ZITHROMAX) 500 mg in 0.9% sodium chloride 250 mL (VIAL-MATE)  500 mg IntraVENous Q24H Jorge Luis Dang  mL/hr at 22 1546 500 mg at 22 1546    hydrOXYzine pamoate (VISTARIL) capsule 25 mg  25 mg Oral TID PRN Jorge Luis Dang NP        melatonin tablet 3 mg  3 mg Oral QHS PRN Jorge Luis Dang NP        albuterol-ipratropium (DUO-NEB) 2.5 MG-0.5 MG/3 ML  3 mL Nebulization Q6H PRN Jorge Luis Dang NP        albuterol (PROVENTIL HFA, VENTOLIN HFA, PROAIR HFA) inhaler 2 Puff  2 Puff Inhalation Q4H PRN Farhana Dang NP        . PHARMACY TO SUBSTITUTE PER PROTOCOL (Reordered from: methylphenidate HCl (Metadate CD) 40 mg BP30)    Per Protocol Edgardo Segal MD        sodium chloride (NS) flush 5-40 mL  5-40 mL IntraVENous Q8H Edgardo Segal MD   10 mL at 22 0203    sodium chloride (NS) flush 5-40 mL  5-40 mL IntraVENous PRN Edgardo Segal MD        acetaminophen (TYLENOL) tablet 650 mg  650 mg Oral Q6H PRN Nieves Rubio MD   650 mg at 22 1315    Or    acetaminophen (TYLENOL) suppository 650 mg  650 mg Rectal Q6H PRN Nieves Rubio MD        polyethylene glycol (MIRALAX) packet 17 g  17 g Oral DAILY PRN Nieves Rubio MD        ondansetron (ZOFRAN ODT) tablet 4 mg  4 mg Oral Q8H PRN Edgardo Segal MD        Or    ondansetron (ZOFRAN) injection 4 mg  4 mg IntraVENous Q6H PRN Nieves Rubio MD        fondaparinux (ARIXTRA) injection 2.5 mg  2.5 mg SubCUTAneous DAILY Nieves Rubio MD   2.5 mg at 22 0835    pantoprazole (PROTONIX) tablet 40 mg  40 mg Oral ACB&D Nieves Rubio MD   40 mg at 22 1546            Allergies   Allergen Reactions    Heparin (Bovine) Other (comments)     Questionable per patient ,back lesions. Objective:     Blood pressure (!) 169/83, pulse 70, temperature 97.9 °F (36.6 °C), resp. rate 18, height 5' 11\" (1.803 m), weight 270 lb (122.5 kg), SpO2 93 %. Temp (24hrs), Av.1 °F (36.7 °C), Min:97.9 °F (36.6 °C), Max:98.2 °F (36.8 °C)      Intake and Output:  Current Shift: No intake/output data recorded.   Last 3 Shifts: No intake/output data recorded. Physical Exam:    General: Lying in bed comfortably, no acute distress  Eye: Reactive, symmetric  Throat and Neck: Supple  Lung: Reduced air entry bilaterally with prolonged exhalation but no wheezing. Occasional crackles. Heart: S1+S2. No murmurs  Abdomen: soft, non-tender. Bowel sounds normal. No masses; obese  Extremities: No edema  : Not done  Skin: No cyanosis  Neurologic: A & O x3. Grossly nonfocal  Psychiatric: Appropriate affect; coherent    Lab/Data Review:  Recent Results (from the past 24 hour(s))   TROPONIN-HIGH SENSITIVITY    Collection Time: 01/18/22  1:36 PM   Result Value Ref Range    Troponin-High Sensitivity 98 (HH) 0 - 76 ng/L   METABOLIC PANEL, BASIC    Collection Time: 01/19/22  2:20 AM   Result Value Ref Range    Sodium 141 136 - 145 mmol/L    Potassium 3.9 3.5 - 5.1 mmol/L    Chloride 104 97 - 108 mmol/L    CO2 34 (H) 21 - 32 mmol/L    Anion gap 3 (L) 5 - 15 mmol/L    Glucose 112 (H) 65 - 100 mg/dL    BUN 16 6 - 20 mg/dL    Creatinine 0.62 (L) 0.70 - 1.30 mg/dL    BUN/Creatinine ratio 26 (H) 12 - 20      GFR est AA >60 >60 ml/min/1.73m2    GFR est non-AA >60 >60 ml/min/1.73m2    Calcium 8.5 8.5 - 10.1 mg/dL   CBC WITH AUTOMATED DIFF    Collection Time: 01/19/22  2:20 AM   Result Value Ref Range    WBC 8.4 4.1 - 11.1 K/uL    RBC 5.04 4.10 - 5.70 M/uL    HGB 11.2 (L) 12.1 - 17.0 g/dL    HCT 37.6 36.6 - 50.3 %    MCV 74.6 (L) 80.0 - 99.0 FL    MCH 22.2 (L) 26.0 - 34.0 PG    MCHC 29.8 (L) 30.0 - 36.5 g/dL    RDW 19.4 (H) 11.5 - 14.5 %    PLATELET 912 060 - 478 K/uL    MPV 10.9 8.9 - 12.9 FL    NRBC 0.0 0.0  WBC    ABSOLUTE NRBC 0.00 0.00 - 0.01 K/uL    NEUTROPHILS 58 32 - 75 %    LYMPHOCYTES 24 12 - 49 %    MONOCYTES 11 5 - 13 %    EOSINOPHILS 5 0 - 7 %    BASOPHILS 1 0 - 1 %    IMMATURE GRANULOCYTES 1 (H) 0 - 0.5 %    ABS. NEUTROPHILS 4.9 1.8 - 8.0 K/UL    ABS. LYMPHOCYTES 2.0 0.8 - 3.5 K/UL    ABS. MONOCYTES 0.9 0.0 - 1.0 K/UL    ABS.  EOSINOPHILS 0.4 0.0 - 0.4 K/UL ABS. BASOPHILS 0.1 0.0 - 0.1 K/UL    ABS. IMM. GRANS. 0.0 0.00 - 0.04 K/UL    DF AUTOMATED       chest X-ray      CT HEAD WO CONT   Final Result   No acute intracranial abnormality. XR CHEST PORT   Final Result   Suspected opacities in the left lung base are nonspecific but could represent   pneumonia in the appropriate clinical setting. Short interval follow-up is   recommended to ensure resolution and exclude other causes of opacification. CT Results  (Last 48 hours)    None          Assessment:     1. Acute respiratory failure with hypercapnia and hypoxia  2. Left lower lobe pneumonia  3. Altered mental status/hypersomnia  4. Cocaine abuse  5. POEMS syndrome  6. Snoring/apnea  7. Asthma    Plan:     Patient admitted to the hospital  Will be watched here closely     Currently on room air  ABG showed significant hypercapnia  Start BiPAP 12/6, rate of 16, FiO2 30%  Further changes in BiPAP settings based on clinical response and ABG results  Will give breaks for meals from BiPAP     Left lower lobe pneumonia on chest x-ray  Continue broad-spectrum antibiotic Zosyn and azithromycin  Cultures to be sent  Further changes in antibiotics based on clinical response and culture results  Leukocytosis has resolved      Patient has urine tox positive for cocaine  Denies any cocaine use  Monitor neurologically     Continue nebulizers for asthma    DVT and GI prophylaxis      Yanet Wale  1/19/2022  9:34 AM  *ATTENTION:  This note has been created by a medical student for educational purposes only. Please do not refer to the content of this note for clinical decision-making, billing, or other purposes. Please see attending physicians note to obtain clinical information on this patient. *

## 2022-01-19 NOTE — ROUTINE PROCESS
[facility]ole TRANSFER - OUT REPORT:    Verbal report given to marcus (name) on Georgeann Seip  being transferred to (unit) for routine progression of care       Report consisted of patients Situation, Background, Assessment and   Recommendations(SBAR). Information from the following report(s) SBAR, ED Summary and MAR was reviewed with the receiving nurse. Lines:   Peripheral IV 01/16/22 Left Antecubital (Active)   Site Assessment Clean, dry, & intact 01/16/22 1908   Phlebitis Assessment 0 01/16/22 1908   Infiltration Assessment 0 01/16/22 1908   Dressing Status Clean, dry, & intact 01/16/22 1908   Dressing Type Transparent 01/16/22 1908   Hub Color/Line Status Pink 01/16/22 1908        Opportunity for questions and clarification was provided.       Patient transported with:   [facility]

## 2022-01-19 NOTE — PROGRESS NOTES
Pulmonary and Critical Care progress note    Subjective:   Consult Note: 1/19/2022 @no control      Chief Complaint:   Chief Complaint   Patient presents with    Altered mental status        This patient has been seen and evaluated at the request of Carmelita García NP. Patient seen and examined in ED  Overnight events noted    Lying in bed  Somewhat more awake this morning  Wife at bedside  No acute distress  Currently on nasal cannula oxygen    ABGs showed pH of 7.32, PCO2 72, PO2 137, bicarb 32, saturation 100% on room air  BiPAP was ordered but patient never placed on BiPAP    Review of Systems:  A comprehensive review of systems was negative except for that written in the HPI. Current Facility-Administered Medications   Medication Dose Route Frequency Provider Last Rate Last Admin    losartan (COZAAR) tablet 100 mg  100 mg Oral DAILY Francisco Javier Rivera MD        butalbital-acetaminophen-caffeine Ittoqqortoormiit, ESGIC) -40 mg per tablet 1 Tablet  1 Tablet Oral Q4H PRN Allison Figueroa PA-C   1 Tablet at 01/18/22 1315    amLODIPine (NORVASC) tablet 5 mg  5 mg Oral DAILY Francisco Javier Rivera MD   5 mg at 01/19/22 0938    piperacillin-tazobactam (ZOSYN) 3.375 g in 0.9% sodium chloride (MBP/ADV) 100 mL MBP  3.375 g IntraVENous Q8H Jorge Luis Dang NP 25 mL/hr at 01/19/22 0938 3.375 g at 01/19/22 6520    azithromycin (ZITHROMAX) 500 mg in 0.9% sodium chloride 250 mL (VIAL-MATE)  500 mg IntraVENous Q24H Jorge Luis Dang  mL/hr at 01/18/22 1546 500 mg at 01/18/22 1546    hydrOXYzine pamoate (VISTARIL) capsule 25 mg  25 mg Oral TID PRN Jorge Luis Dang NP        melatonin tablet 3 mg  3 mg Oral QHS PRN Jorge Luis Dang NP        albuterol-ipratropium (DUO-NEB) 2.5 MG-0.5 MG/3 ML  3 mL Nebulization Q6H PRN Jorge Luis Dang NP        albuterol (PROVENTIL HFA, VENTOLIN HFA, PROAIR HFA) inhaler 2 Puff  2 Puff Inhalation Q4H PRN Soniya Danga Hatter, NP        . PHARMACY TO SUBSTITUTE PER PROTOCOL (Reordered from: methylphenidate HCl (Metadate CD) 40 mg BP30)    Per Protocol Alberto Segal MD        sodium chloride (NS) flush 5-40 mL  5-40 mL IntraVENous Q8H Alberto Segal MD   10 mL at 22 0203    sodium chloride (NS) flush 5-40 mL  5-40 mL IntraVENous PRN Alberto Segal MD        acetaminophen (TYLENOL) tablet 650 mg  650 mg Oral Q6H PRN Serjio Don MD   650 mg at 22 1315    Or    acetaminophen (TYLENOL) suppository 650 mg  650 mg Rectal Q6H PRN Serjio Don MD        polyethylene glycol (MIRALAX) packet 17 g  17 g Oral DAILY PRN Serjio Don MD        ondansetron (ZOFRAN ODT) tablet 4 mg  4 mg Oral Q8H PRN Alberto Segal MD        Or    ondansetron (ZOFRAN) injection 4 mg  4 mg IntraVENous Q6H PRN Serjio Don MD        fondaparinux (ARIXTRA) injection 2.5 mg  2.5 mg SubCUTAneous DAILY Serjio Don MD   2.5 mg at 22 1120    pantoprazole (PROTONIX) tablet 40 mg  40 mg Oral ACB&D Serjio Don MD   40 mg at 22 1425          Allergies   Allergen Reactions    Heparin (Bovine) Other (comments)     Questionable per patient ,back lesions. Objective:     Blood pressure (!) 141/88, pulse 69, temperature 98 °F (36.7 °C), resp. rate 20, height 5' 11\" (1.803 m), weight 122.5 kg (270 lb), SpO2 96 %. Temp (24hrs), Av.1 °F (36.7 °C), Min:97.9 °F (36.6 °C), Max:98.2 °F (36.8 °C)      Intake and Output:  Current Shift: No intake/output data recorded. Last 3 Shifts: No intake/output data recorded. No intake or output data in the 24 hours ending 22 1142     Physical Exam:     General: Lying in bed comfortably, no acute distress on 3 L nasal cannula oxygen  Eye: Reactive, symmetric  Throat and Neck: Supple  Lung: Reduced air entry bilaterally with prolonged exhalation but no wheezing. Crackles in left mid and lower lung zone. Heart: S1+S2. No murmurs  Abdomen: soft, non-tender.  Bowel sounds normal. No masses; obese  Extremities: No edema  : Not done  Skin: No cyanosis  Neurologic: A & O x3. Grossly nonfocal  Psychiatric: Appropriate affect; coherent      Lab/Data Review:    Recent Results (from the past 24 hour(s))   TROPONIN-HIGH SENSITIVITY    Collection Time: 01/18/22  1:36 PM   Result Value Ref Range    Troponin-High Sensitivity 98 (HH) 0 - 76 ng/L   METABOLIC PANEL, BASIC    Collection Time: 01/19/22  2:20 AM   Result Value Ref Range    Sodium 141 136 - 145 mmol/L    Potassium 3.9 3.5 - 5.1 mmol/L    Chloride 104 97 - 108 mmol/L    CO2 34 (H) 21 - 32 mmol/L    Anion gap 3 (L) 5 - 15 mmol/L    Glucose 112 (H) 65 - 100 mg/dL    BUN 16 6 - 20 mg/dL    Creatinine 0.62 (L) 0.70 - 1.30 mg/dL    BUN/Creatinine ratio 26 (H) 12 - 20      GFR est AA >60 >60 ml/min/1.73m2    GFR est non-AA >60 >60 ml/min/1.73m2    Calcium 8.5 8.5 - 10.1 mg/dL   CBC WITH AUTOMATED DIFF    Collection Time: 01/19/22  2:20 AM   Result Value Ref Range    WBC 8.4 4.1 - 11.1 K/uL    RBC 5.04 4.10 - 5.70 M/uL    HGB 11.2 (L) 12.1 - 17.0 g/dL    HCT 37.6 36.6 - 50.3 %    MCV 74.6 (L) 80.0 - 99.0 FL    MCH 22.2 (L) 26.0 - 34.0 PG    MCHC 29.8 (L) 30.0 - 36.5 g/dL    RDW 19.4 (H) 11.5 - 14.5 %    PLATELET 520 256 - 520 K/uL    MPV 10.9 8.9 - 12.9 FL    NRBC 0.0 0.0  WBC    ABSOLUTE NRBC 0.00 0.00 - 0.01 K/uL    NEUTROPHILS 58 32 - 75 %    LYMPHOCYTES 24 12 - 49 %    MONOCYTES 11 5 - 13 %    EOSINOPHILS 5 0 - 7 %    BASOPHILS 1 0 - 1 %    IMMATURE GRANULOCYTES 1 (H) 0 - 0.5 %    ABS. NEUTROPHILS 4.9 1.8 - 8.0 K/UL    ABS. LYMPHOCYTES 2.0 0.8 - 3.5 K/UL    ABS. MONOCYTES 0.9 0.0 - 1.0 K/UL    ABS. EOSINOPHILS 0.4 0.0 - 0.4 K/UL    ABS. BASOPHILS 0.1 0.0 - 0.1 K/UL    ABS. IMM. GRANS. 0.0 0.00 - 0.04 K/UL    DF AUTOMATED         CT HEAD WO CONT   Final Result   No acute intracranial abnormality. XR CHEST PORT   Final Result   Suspected opacities in the left lung base are nonspecific but could represent   pneumonia in the appropriate clinical setting.  Short interval follow-up is   recommended to ensure resolution and exclude other causes of opacification. XR CHEST PORT    (Results Pending)       CT Results  (Last 48 hours)    None            Assessment:     1. Acute respiratory failure with hypercapnia and hypoxia  2. Left lower lobe pneumonia  3. Altered mental status/hypersomnia  4. Cocaine abuse  5. POEMS syndrome  6. Snoring/apnea  7. Asthma    Plan:     Patient seen in the ED  Being admitted to the hospital  Will be watched here closely    Currently on 3 L nasal cannula oxygen  ABG showed significant hypercapnia  Above orders placed yesterday but was never started  Will check ABG this morning now that he is somewhat more awake before making determination whether BiPAP is necessary at this time    Left lower lobe pneumonia on chest x-ray  Continue broad-spectrum antibiotic Zosyn and azithromycin  Cultures to be sent  Further changes in antibiotics based on clinical response and culture results  Repeat x-ray today    Patient has urine tox positive for cocaine  Denies any cocaine use  Monitor neurologically    Continue nebulizers for asthma    DVT and GI prophylaxis    Out of bed to chair with support    Questions of patient were answered at bedside in detail  Case discussed in detail with RN, RT, and care team  Thank you for involving me in the care of this patient  I will follow with you closely during hospitalization    Time spent more than 30 minutes in direct patient care with no overlap reviewing results and records, decision making, and answering questions.       Angelina Pichardo MD  [facility]  1/19/2022  4:00 PM

## 2022-01-19 NOTE — PROGRESS NOTES
Hospitalist Progress Note    Subjective:   Daily Progress Note: 1/19/2022 10:29 AM    Hospital Course: Patient is a 50-year-old male with a history of GI bleed PIEMS syndrome that presented to the ED on 1/16/2022 for altered mental status. He had been feeling more fatigued and trouble staying awake for 1 day prior. On EMS arrival patient was found to be hypoxic with oxygen saturations in the 50s. He was placed on a nonrebreather. He denied any chest pain, palpitations, shortness of breath. He did have a productive cough. In the ED rapid COVID-negative. Chest x-ray showed signs concerning for pneumonia. CT of the head showed no acute intracranial abnormality. Troponins indeterminately elevated. Patient was placed on IV Zosyn. Pulmonology and cardiology consulted. 2D echo showed left ventricular size normal, mild septal thickening, normal wall motion, EF of 60 to 24%, normal diastolic function.   Patient weaned down to 3 L of oxygen nasal cannula      Subjective: Patient seen and evaluated at bedside, currently feels significantly better, weaned off of oxygen however does remain drowsy, discussed with patient and patient's wife at bedside as well as RN    Current Facility-Administered Medications   Medication Dose Route Frequency    losartan (COZAAR) tablet 100 mg  100 mg Oral DAILY    butalbital-acetaminophen-caffeine (FIORICET, ESGIC) -40 mg per tablet 1 Tablet  1 Tablet Oral Q4H PRN    amLODIPine (NORVASC) tablet 5 mg  5 mg Oral DAILY    piperacillin-tazobactam (ZOSYN) 3.375 g in 0.9% sodium chloride (MBP/ADV) 100 mL MBP  3.375 g IntraVENous Q8H    azithromycin (ZITHROMAX) 500 mg in 0.9% sodium chloride 250 mL (VIAL-MATE)  500 mg IntraVENous Q24H    hydrOXYzine pamoate (VISTARIL) capsule 25 mg  25 mg Oral TID PRN    melatonin tablet 3 mg  3 mg Oral QHS PRN    albuterol-ipratropium (DUO-NEB) 2.5 MG-0.5 MG/3 ML  3 mL Nebulization Q6H PRN    albuterol (PROVENTIL HFA, VENTOLIN HFA, PROAIR HFA) inhaler 2 Puff  2 Puff Inhalation Q4H PRN    . PHARMACY TO SUBSTITUTE PER PROTOCOL (Reordered from: methylphenidate HCl (Metadate CD) 40 mg BP30)    Per Protocol    sodium chloride (NS) flush 5-40 mL  5-40 mL IntraVENous Q8H    sodium chloride (NS) flush 5-40 mL  5-40 mL IntraVENous PRN    acetaminophen (TYLENOL) tablet 650 mg  650 mg Oral Q6H PRN    Or    acetaminophen (TYLENOL) suppository 650 mg  650 mg Rectal Q6H PRN    polyethylene glycol (MIRALAX) packet 17 g  17 g Oral DAILY PRN    ondansetron (ZOFRAN ODT) tablet 4 mg  4 mg Oral Q8H PRN    Or    ondansetron (ZOFRAN) injection 4 mg  4 mg IntraVENous Q6H PRN    fondaparinux (ARIXTRA) injection 2.5 mg  2.5 mg SubCUTAneous DAILY    pantoprazole (PROTONIX) tablet 40 mg  40 mg Oral ACB&D        Review of Systems  Constitutional: No fevers, No chills, No sweats, ++ fatigue, ++ Weakness  Eyes: No redness  Ears, nose, mouth, throat, and face: No nasal congestion, No sore throat, No voice change  Respiratory: No Shortness of Breath, ++ cough, No wheezing  Cardiovascular: No chest pain, No palpitations, No extremity edema  Gastrointestinal: No nausea, No vomiting, No diarrhea, No abdominal pain  Genitourinary: No frequency, No dysuria, No hematuria  Integument/breast: No skin lesion(s)   Neurological: No Confusion, No headaches, No dizziness      Objective:     Visit Vitals  BP (!) 141/88 (BP 1 Location: Left upper arm, BP Patient Position: At rest)   Pulse 69   Temp 98 °F (36.7 °C)   Resp 20   Ht 5' 11\" (1.803 m)   Wt 122.5 kg (270 lb)   SpO2 96%   BMI 37.66 kg/m²    O2 Flow Rate (L/min): 1 l/min O2 Device: None (Room air)    Temp (24hrs), Av.1 °F (36.7 °C), Min:97.9 °F (36.6 °C), Max:98.2 °F (36.8 °C)      No intake/output data recorded. No intake/output data recorded. PHYSICAL EXAM:  Constitutional: No acute distress  Skin: Extremities and face reveal no rashes. HEENT: Sclerae anicteric. Extra-occular muscles are intact. No oral ulcers.  The neck is supple and no masses. Cardiovascular: Regular rate and rhythm. Respiratory:  Nonlabored, diminished  GI: Abdomen nondistended, soft, and nontender. Normal active bowel sounds. Musculoskeletal: No pitting edema of the lower legs. Able to move all ext  Neurological:  Lethargic Patient is alert and oriented. Cranial nerves II-XII grossly intact  Psychiatric: Mood appears appropriate       Data Review    Recent Results (from the past 24 hour(s))   TROPONIN-HIGH SENSITIVITY    Collection Time: 01/18/22  1:36 PM   Result Value Ref Range    Troponin-High Sensitivity 98 (HH) 0 - 76 ng/L   METABOLIC PANEL, BASIC    Collection Time: 01/19/22  2:20 AM   Result Value Ref Range    Sodium 141 136 - 145 mmol/L    Potassium 3.9 3.5 - 5.1 mmol/L    Chloride 104 97 - 108 mmol/L    CO2 34 (H) 21 - 32 mmol/L    Anion gap 3 (L) 5 - 15 mmol/L    Glucose 112 (H) 65 - 100 mg/dL    BUN 16 6 - 20 mg/dL    Creatinine 0.62 (L) 0.70 - 1.30 mg/dL    BUN/Creatinine ratio 26 (H) 12 - 20      GFR est AA >60 >60 ml/min/1.73m2    GFR est non-AA >60 >60 ml/min/1.73m2    Calcium 8.5 8.5 - 10.1 mg/dL   CBC WITH AUTOMATED DIFF    Collection Time: 01/19/22  2:20 AM   Result Value Ref Range    WBC 8.4 4.1 - 11.1 K/uL    RBC 5.04 4.10 - 5.70 M/uL    HGB 11.2 (L) 12.1 - 17.0 g/dL    HCT 37.6 36.6 - 50.3 %    MCV 74.6 (L) 80.0 - 99.0 FL    MCH 22.2 (L) 26.0 - 34.0 PG    MCHC 29.8 (L) 30.0 - 36.5 g/dL    RDW 19.4 (H) 11.5 - 14.5 %    PLATELET 796 552 - 508 K/uL    MPV 10.9 8.9 - 12.9 FL    NRBC 0.0 0.0  WBC    ABSOLUTE NRBC 0.00 0.00 - 0.01 K/uL    NEUTROPHILS 58 32 - 75 %    LYMPHOCYTES 24 12 - 49 %    MONOCYTES 11 5 - 13 %    EOSINOPHILS 5 0 - 7 %    BASOPHILS 1 0 - 1 %    IMMATURE GRANULOCYTES 1 (H) 0 - 0.5 %    ABS. NEUTROPHILS 4.9 1.8 - 8.0 K/UL    ABS. LYMPHOCYTES 2.0 0.8 - 3.5 K/UL    ABS. MONOCYTES 0.9 0.0 - 1.0 K/UL    ABS. EOSINOPHILS 0.4 0.0 - 0.4 K/UL    ABS. BASOPHILS 0.1 0.0 - 0.1 K/UL    ABS. IMM.  GRANS. 0.0 0.00 - 0.04 K/UL DF AUTOMATED         Radiology review: CT of head, ECHO, chest xray    Assessment:   1. Septic encephalopathy  2. Acute hypoxic respiratory failure secondary to community-acquired pneumonia  3. Non-STEMI type II    Plan:   Acute hypercapnic respiratory failure secondary to pneumonia- of note patient has been weaned off of oxygen however still has evidence of hypercapnia, still somnolent, overall clinical status has improved, does not meet sepsis criteria  Follow-up blood cultures  Follow-up sputum cultures  Continue Zosyn/azithromycin for additional antibiotic coverage  Obtain arterial blood gas  Patient will likely need intermittent BiPAP today  Continue to monitor respiratory status  Pulmonology consult appreciated, continue to follow recommendations    Type II non-ST elevation MI-currently serum troponin has down trended, no further intervention from cardiovascular standpoint  Continue current medications  Cardiology consult appreciated, continue to follow recommendations    Hypertension-continue current antihypertensive medications    Gastroesophageal reflux disease-continue current medications    Prophylaxis- SCDs  FEN-cardiac diet, replete potassium and magnesium  Full code, patient surrogate decision-maker is his wife, updated at bedside  Disposition- plan for discharge home tomorrow once CO2 normalizes    Care Plan discussed with: Patient/Family, Nurse and     Total time spent with patient: 35 minutes.

## 2022-01-20 VITALS
WEIGHT: 270 LBS | HEIGHT: 71 IN | TEMPERATURE: 97.7 F | OXYGEN SATURATION: 96 % | DIASTOLIC BLOOD PRESSURE: 84 MMHG | RESPIRATION RATE: 18 BRPM | SYSTOLIC BLOOD PRESSURE: 149 MMHG | HEART RATE: 62 BPM | BODY MASS INDEX: 37.8 KG/M2

## 2022-01-20 LAB
ANION GAP SERPL CALC-SCNC: 4 MMOL/L (ref 5–15)
BUN SERPL-MCNC: 16 MG/DL (ref 6–20)
BUN/CREAT SERPL: 26 (ref 12–20)
CA-I BLD-MCNC: 8.9 MG/DL (ref 8.5–10.1)
CHLORIDE SERPL-SCNC: 108 MMOL/L (ref 97–108)
CO2 SERPL-SCNC: 28 MMOL/L (ref 21–32)
CREAT SERPL-MCNC: 0.61 MG/DL (ref 0.7–1.3)
ERYTHROCYTE [DISTWIDTH] IN BLOOD BY AUTOMATED COUNT: 20.3 % (ref 11.5–14.5)
GLUCOSE SERPL-MCNC: 95 MG/DL (ref 65–100)
HCT VFR BLD AUTO: 41.8 % (ref 36.6–50.3)
HGB BLD-MCNC: 12.6 G/DL (ref 12.1–17)
MAGNESIUM SERPL-MCNC: 2 MG/DL (ref 1.6–2.4)
MCH RBC QN AUTO: 22.1 PG (ref 26–34)
MCHC RBC AUTO-ENTMCNC: 30.1 G/DL (ref 30–36.5)
MCV RBC AUTO: 73.2 FL (ref 80–99)
NRBC # BLD: 0 K/UL (ref 0–0.01)
NRBC BLD-RTO: 0 PER 100 WBC
PLATELET # BLD AUTO: 223 K/UL (ref 150–400)
POTASSIUM SERPL-SCNC: 3.6 MMOL/L (ref 3.5–5.1)
RBC # BLD AUTO: 5.71 M/UL (ref 4.1–5.7)
SODIUM SERPL-SCNC: 140 MMOL/L (ref 136–145)
WBC # BLD AUTO: 7.3 K/UL (ref 4.1–11.1)

## 2022-01-20 PROCEDURE — 80048 BASIC METABOLIC PNL TOTAL CA: CPT

## 2022-01-20 PROCEDURE — 36415 COLL VENOUS BLD VENIPUNCTURE: CPT

## 2022-01-20 PROCEDURE — 85027 COMPLETE CBC AUTOMATED: CPT

## 2022-01-20 PROCEDURE — 74011250636 HC RX REV CODE- 250/636: Performed by: NURSE PRACTITIONER

## 2022-01-20 PROCEDURE — 74011250636 HC RX REV CODE- 250/636: Performed by: INTERNAL MEDICINE

## 2022-01-20 PROCEDURE — 74011250637 HC RX REV CODE- 250/637: Performed by: INTERNAL MEDICINE

## 2022-01-20 PROCEDURE — 83735 ASSAY OF MAGNESIUM: CPT

## 2022-01-20 PROCEDURE — 74011000258 HC RX REV CODE- 258: Performed by: NURSE PRACTITIONER

## 2022-01-20 RX ORDER — LOSARTAN POTASSIUM 100 MG/1
100 TABLET ORAL DAILY
Qty: 30 TABLET | Refills: 0 | Status: SHIPPED | OUTPATIENT
Start: 2022-01-21 | End: 2022-02-07

## 2022-01-20 RX ORDER — POTASSIUM CHLORIDE 20 MEQ/1
40 TABLET, EXTENDED RELEASE ORAL
Status: COMPLETED | OUTPATIENT
Start: 2022-01-20 | End: 2022-01-20

## 2022-01-20 RX ORDER — BUTALBITAL, ACETAMINOPHEN AND CAFFEINE 50; 325; 40 MG/1; MG/1; MG/1
1 TABLET ORAL
Qty: 30 TABLET | Refills: 0 | Status: SHIPPED | OUTPATIENT
Start: 2022-01-20

## 2022-01-20 RX ORDER — LANOLIN ALCOHOL/MO/W.PET/CERES
3 CREAM (GRAM) TOPICAL
Qty: 60 TABLET | Refills: 0 | Status: SHIPPED | OUTPATIENT
Start: 2022-01-20

## 2022-01-20 RX ORDER — ALBUTEROL SULFATE 90 UG/1
2 AEROSOL, METERED RESPIRATORY (INHALATION)
Qty: 18 G | Refills: 2 | Status: SHIPPED | OUTPATIENT
Start: 2022-01-20

## 2022-01-20 RX ORDER — LEVOFLOXACIN 500 MG/1
500 TABLET, FILM COATED ORAL DAILY
Qty: 9 TABLET | Refills: 0 | Status: SHIPPED | OUTPATIENT
Start: 2022-01-20 | End: 2022-07-11

## 2022-01-20 RX ORDER — ACETAMINOPHEN 325 MG/1
650 TABLET ORAL
Qty: 60 TABLET | Refills: 0 | Status: SHIPPED | OUTPATIENT
Start: 2022-01-20

## 2022-01-20 RX ORDER — AMLODIPINE BESYLATE 5 MG/1
5 TABLET ORAL DAILY
Qty: 30 TABLET | Refills: 0 | Status: SHIPPED | OUTPATIENT
Start: 2022-01-21

## 2022-01-20 RX ADMIN — LOSARTAN POTASSIUM 100 MG: 50 TABLET, FILM COATED ORAL at 08:16

## 2022-01-20 RX ADMIN — PIPERACILLIN AND TAZOBACTAM 3.38 G: 3; .375 INJECTION, POWDER, LYOPHILIZED, FOR SOLUTION INTRAVENOUS at 00:37

## 2022-01-20 RX ADMIN — PIPERACILLIN AND TAZOBACTAM 3.38 G: 3; .375 INJECTION, POWDER, LYOPHILIZED, FOR SOLUTION INTRAVENOUS at 08:17

## 2022-01-20 RX ADMIN — PANTOPRAZOLE SODIUM 40 MG: 40 TABLET, DELAYED RELEASE ORAL at 08:16

## 2022-01-20 RX ADMIN — POTASSIUM CHLORIDE 40 MEQ: 1500 TABLET, EXTENDED RELEASE ORAL at 10:48

## 2022-01-20 RX ADMIN — FONDAPARINUX SODIUM 2.5 MG: 2.5 INJECTION, SOLUTION SUBCUTANEOUS at 08:18

## 2022-01-20 RX ADMIN — AMLODIPINE BESYLATE 5 MG: 5 TABLET ORAL at 08:16

## 2022-01-20 RX ADMIN — METHYLPHENIDATE HYDROCHLORIDE 20 MG: 10 TABLET ORAL at 08:16

## 2022-01-20 NOTE — MED STUDENT NOTES
Pulmonology and Critical Care Progress Note    Subjective:     Chief Complaint:   Chief Complaint   Patient presents with    Altered mental status          Patient seen and examined  Overnight as noted    Lying in bed comfortably  Awake and alert  On room air  No acute distress    Yesterday blood gas showed pH of 7.46, PCO2 48, PO2 73, bicarb 32, saturation 96% on room air  Patient did not require BiPAP    Repeat chest Xray showed clear lungs    Review of Systems:  Pertinent items are noted in HPI.     Current Facility-Administered Medications   Medication Dose Route Frequency Provider Last Rate Last Admin    losartan (COZAAR) tablet 100 mg  100 mg Oral DAILY Toya Nunes MD   100 mg at 01/20/22 0816    pantoprazole (PROTONIX) tablet 40 mg  40 mg Oral ACB Meir Kaplan MD   40 mg at 01/20/22 0816    butalbital-acetaminophen-caffeine (FIORICET, ESGIC) -40 mg per tablet 1 Tablet  1 Tablet Oral Q4H PRN Bisi Ellsworth PA-C   1 Tablet at 01/18/22 1315    amLODIPine (NORVASC) tablet 5 mg  5 mg Oral DAILY Toya Nunes MD   5 mg at 01/20/22 0816    piperacillin-tazobactam (ZOSYN) 3.375 g in 0.9% sodium chloride (MBP/ADV) 100 mL MBP  3.375 g IntraVENous Q8H Jorge Luis Dang, NP 25 mL/hr at 01/20/22 0817 3.375 g at 01/20/22 0817    azithromycin (ZITHROMAX) 500 mg in 0.9% sodium chloride 250 mL (VIAL-MATE)  500 mg IntraVENous Q24H Jorge Luis Dang  mL/hr at 01/19/22 1558 500 mg at 01/19/22 1558    hydrOXYzine pamoate (VISTARIL) capsule 25 mg  25 mg Oral TID PRN Jorge Luis Dang NP        melatonin tablet 3 mg  3 mg Oral QHS PRN Jorge Luis Dang, NP        albuterol-ipratropium (DUO-NEB) 2.5 MG-0.5 MG/3 ML  3 mL Nebulization Q6H PRN Jorge Luis Dang, NP        albuterol (PROVENTIL HFA, VENTOLIN HFA, PROAIR HFA) inhaler 2 Puff  2 Puff Inhalation Q4H PRN Jorge Luis Dang NP        methylphenidate HCl (RITALIN) tablet 20 mg  20 mg Oral BID Jessica Murphy MD   20 mg at 01/20/22 3584    sodium chloride (NS) flush 5-40 mL  5-40 mL IntraVENous Q8H Yolanda Segal MD   10 mL at 22 2148    sodium chloride (NS) flush 5-40 mL  5-40 mL IntraVENous PRN Yolanda Segal MD        acetaminophen (TYLENOL) tablet 650 mg  650 mg Oral Q6H PRN Gabi Key MD   650 mg at 22 1315    Or    acetaminophen (TYLENOL) suppository 650 mg  650 mg Rectal Q6H PRN Yolanda Segal MD        polyethylene glycol (MIRALAX) packet 17 g  17 g Oral DAILY PRN Gabi Key MD        ondansetron (ZOFRAN ODT) tablet 4 mg  4 mg Oral Q8H PRN Yolanda Segal MD        Or    ondansetron (ZOFRAN) injection 4 mg  4 mg IntraVENous Q6H PRN Gabi Key MD        fondaparinux (ARIXTRA) injection 2.5 mg  2.5 mg SubCUTAneous DAILY Gabi Key MD   2.5 mg at 22 0818            Allergies   Allergen Reactions    Heparin (Bovine) Other (comments)     Questionable per patient ,back lesions. Objective:     Blood pressure (!) 149/84, pulse 62, temperature 97.7 °F (36.5 °C), resp. rate 18, height 5' 11\" (1.803 m), weight 270 lb (122.5 kg), SpO2 96 %. Temp (24hrs), Av °F (36.7 °C), Min:97.7 °F (36.5 °C), Max:98.3 °F (36.8 °C)      Intake and Output:  Current Shift: No intake/output data recorded. Last 3 Shifts: No intake/output data recorded. Physical Exam:    General: Lying in bed comfortably, no acute distress  Eye: Reactive, symmetric  Throat and Neck: Supple  Lung: Reduced air entry bilaterally with prolonged exhalation but no wheezing. Occasional crackles. Heart: S1+S2. No murmurs  Abdomen: soft, non-tender. Bowel sounds normal. No masses; obese  Extremities: No edema  : Not done  Skin: No cyanosis  Neurologic: A & O x3.   Grossly nonfocal  Psychiatric: Appropriate affect; coherent    Lab/Data Review:  Recent Results (from the past 24 hour(s))   BLOOD GAS, ARTERIAL    Collection Time: 22 12:20 PM   Result Value Ref Range    pH 7.46 (H) 7.35 - 7.45      PCO2 48 (H) 35 - 45 mmHg    PO2 73 (L) 75 - 100 mmHg    O2 SAT 96 >95 %    BICARBONATE 32 (H) 22 - 26 mmol/L    BASE EXCESS 8.7 (H) 0 - 2 mmol/L    O2 METHOD Room air      FIO2 21.0 %    Sample source Arterial      SITE Left Radial      MIKAL'S TEST PASS     CBC W/O DIFF    Collection Time: 01/20/22  7:29 AM   Result Value Ref Range    WBC 7.3 4.1 - 11.1 K/uL    RBC 5.71 (H) 4.10 - 5.70 M/uL    HGB 12.6 12.1 - 17.0 g/dL    HCT 41.8 36.6 - 50.3 %    MCV 73.2 (L) 80.0 - 99.0 FL    MCH 22.1 (L) 26.0 - 34.0 PG    MCHC 30.1 30.0 - 36.5 g/dL    RDW 20.3 (H) 11.5 - 14.5 %    PLATELET 670 715 - 216 K/uL    NRBC 0.0 0.0  WBC    ABSOLUTE NRBC 0.00 0.00 - 1.34 K/uL   METABOLIC PANEL, BASIC    Collection Time: 01/20/22  7:29 AM   Result Value Ref Range    Sodium 140 136 - 145 mmol/L    Potassium 3.6 3.5 - 5.1 mmol/L    Chloride 108 97 - 108 mmol/L    CO2 28 21 - 32 mmol/L    Anion gap 4 (L) 5 - 15 mmol/L    Glucose 95 65 - 100 mg/dL    BUN 16 6 - 20 mg/dL    Creatinine 0.61 (L) 0.70 - 1.30 mg/dL    BUN/Creatinine ratio 26 (H) 12 - 20      GFR est AA >60 >60 ml/min/1.73m2    GFR est non-AA >60 >60 ml/min/1.73m2    Calcium 8.9 8.5 - 10.1 mg/dL   MAGNESIUM    Collection Time: 01/20/22  7:29 AM   Result Value Ref Range    Magnesium 2.0 1.6 - 2.4 mg/dL     chest X-ray      XR CHEST PORT   Final Result      CT HEAD WO CONT   Final Result   No acute intracranial abnormality. XR CHEST PORT   Final Result   Suspected opacities in the left lung base are nonspecific but could represent   pneumonia in the appropriate clinical setting. Short interval follow-up is   recommended to ensure resolution and exclude other causes of opacification. CT Results  (Last 48 hours)    None          Assessment:     1. Acute respiratory failure with hypercapnia and hypoxia  2. Left lower lobe pneumonia  3. Altered mental status/hypersomnia  4. Cocaine abuse  5. POEMS syndrome  6. Snoring/apnea  7.   Asthma    Plan:     Patient admitted to the hospital  Will be watched here closely     Currently on room air  ABG 1/17 showed significant hypercapnia pCO2 72  ABG 1/19 showed improved hypercapnia pCO2 48  Patient was never put on BiPAP     Left lower lobe pneumonia on chest x-ray  Continue broad-spectrum antibiotic Zosyn and azithromycin  Cultures to be sent, no growth so far  Further changes in antibiotics based on clinical response and culture results  Repeat x-ray yesterday showed clear lungs     Patient has urine tox positive for cocaine  Denies any cocaine use  Monitor neurologically     Continue nebulizers for asthma    DVT and GI prophylaxis    Yanet Eastmanak    1/20/2022  9:59 AM  *ATTENTION:  This note has been created by a medical student for educational purposes only. Please do not refer to the content of this note for clinical decision-making, billing, or other purposes. Please see attending physicians note to obtain clinical information on this patient. *

## 2022-01-20 NOTE — DISCHARGE SUMMARY
Hospitalist Discharge Summary     Patient ID:  Leonie Kapadia  [id number]  45 y.o.  1965  1/16/2022    PCP on record: Sherin Dumont MD    Admit date: 1/16/2022  Discharge date and time: 1/20/2022    DISCHARGE DIAGNOSIS:    Acute hypoxic/hypercapnic respiratory failure/pneumonia/type II non-ST elevation MI/hypertension/gastroesophageal reflux disease    CONSULTATIONS:  IP CONSULT TO CARDIOLOGY  IP CONSULT TO PULMONOLOGY    Excerpted HPI from H&P of Jeffrey Muhammad MD:  Leonie Kapadia is a 55 y.o. 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 Of note, he was driving when from [facility] and arrived late last night since then,. He has been feeling fatigued and have been having trouble staying awake for about 12 hours continuously. On EMS arrival, he was found to be hypoxic respiratory 50s, and he was placed on nonrebreather. Subsequently, patient is more awake and able to answer question appropriately. He denies chest pain, palpitation, productive cough or shortness of breath. Also denies any GI symptoms. He denies any recent outdoor activities.     In the ED, he was found to have elevated troponin. He previously had adverse reaction to heparin, and heparin drip was not initiated. Also has leukocytosis, WBC 14.6. Chest x-ray concerning for left lower lung pneumonia. CT head showed no acute intracranial abnormalities. ______________________________________________________________________  DISCHARGE SUMMARY/HOSPITAL COURSE:  for full details see H&P, daily progress notes, labs, consult notes. Patient is a 72-year-old male with a history of GI bleed PIEMS syndrome that presented to the ED on 1/16/2022 for altered mental status. He had been feeling more fatigued and trouble staying awake for 1 day prior. On EMS arrival patient was found to be hypoxic with oxygen saturations in the 50s.   He was placed on a nonrebreather. He denied any chest pain, palpitations, shortness of breath. He did have a productive cough. In the ED rapid COVID-negative. Chest x-ray showed signs concerning for pneumonia. CT of the head showed no acute intracranial abnormality. Troponins indeterminately elevated. Patient was placed on IV Zosyn. Pulmonology and cardiology consulted. 2D echo showed left ventricular size normal, mild septal thickening, normal wall motion, EF of 60 to 24%, normal diastolic function. , Patient remained on antibiotics, patient was subsequently weaned off of oxygen, abnormal cardiac enzymes secondary to type II non-ST elevation MI, patient was started on antihypertensive medications by cardiology, of note patient CO2 also improved, mental status improved, following which patient was deemed stable for discharge on oral antibiotics with close outpatient follow-up with primary care physician as well as cardiology as well as pulmonology, plan was explained the patient is agreeable to current plan        _______________________________________________________________________  Patient seen and examined by me on discharge day. Pertinent Findings:  Gen:    Not in distress  Chest: Decreased air entry bilaterally in bilateral lower lung zone  CVS:   Regular rhythm, s1/s2 no m/r/g  No edema  Abd:  Soft, not distended, not tender  Neuro:  Alert, Oriented x 4  _______________________________________________________________________  DISCHARGE MEDICATIONS:   Current Discharge Medication List      START taking these medications    Details   acetaminophen (TYLENOL) 325 mg tablet Take 2 Tablets by mouth every six (6) hours as needed for Pain or Fever. Qty: 60 Tablet, Refills: 0  Start date: 1/20/2022      albuterol (PROVENTIL HFA, VENTOLIN HFA, PROAIR HFA) 90 mcg/actuation inhaler Take 2 Puffs by inhalation every four (4) hours as needed for Wheezing or Shortness of Breath.   Qty: 18 g, Refills: 2  Start date: 1/20/2022 amLODIPine (NORVASC) 5 mg tablet Take 1 Tablet by mouth daily. Qty: 30 Tablet, Refills: 0  Start date: 1/21/2022      butalbital-acetaminophen-caffeine (FIORICET, ESGIC) -40 mg per tablet Take 1 Tablet by mouth every four (4) hours as needed for Headache or Migraine. Qty: 30 Tablet, Refills: 0  Start date: 1/20/2022      losartan (COZAAR) 100 mg tablet Take 1 Tablet by mouth daily. Qty: 30 Tablet, Refills: 0  Start date: 1/21/2022      melatonin 3 mg tablet Take 1 Tablet by mouth nightly as needed for Insomnia. Qty: 60 Tablet, Refills: 0  Start date: 1/20/2022      levoFLOXacin (Levaquin) 500 mg tablet Take 1 Tablet by mouth daily. Qty: 9 Tablet, Refills: 0  Start date: 1/20/2022         CONTINUE these medications which have NOT CHANGED    Details   pantoprazole (PROTONIX) 40 mg tablet Take 1 Tablet by mouth two (2) times a day. Qty: 180 Tablet, Refills: 3      methylphenidate HCl (Metadate CD) 40 mg BP30 Take 40 mg by mouth every morning. Max Daily Amount: 40 mg.  Qty: 90 Capsule, Refills: 0    Associated Diagnoses: Attention deficit disorder, unspecified hyperactivity presence      sildenafil citrate (Viagra) 100 mg tablet Take 1 Tablet by mouth as needed for Erectile Dysfunction. Qty: 6 Tablet, Refills: 1    Associated Diagnoses: Erectile dysfunction, unspecified erectile dysfunction type         STOP taking these medications       zolpidem (AMBIEN) 10 mg tablet Comments:   Reason for Stopping:                 Patient Follow Up Instructions: Activity: Activity as tolerated  Diet: Cardiac Diet  Wound Care: None needed    Follow-up with PCP/Pulmonology/Cardiology in 2 weeks.   Follow-up tests/labs As per above physicians  Follow-up Information     Follow up With Specialties Details Why Contact Info    Francisco Negron MD Family Medicine In 1 week  [address] [facility]  [phone]      Sue Barragan MD Pulmonary Disease, Internal Medicine In 1 week  [address] [facility] [place] [address]      [facility] [address] [facility], Cardiology, Interventional Cardiology In 1 week  [address] [address]  [phone]          ________________________________________________________________    Risk of deterioration: Low    Condition at Discharge:  Stable  __________________________________________________________________    Disposition  Home with family, no needs    ____________________________________________________________________    Code Status: Full Code  ___________________________________________________________________      Total time in minutes spent coordinating this discharge (includes going over instructions, follow-up, prescriptions, and preparing report for sign off to her PCP) :  45 minutes    Signed:   Karen Carr MD

## 2022-01-20 NOTE — PROGRESS NOTES
ME home today. Problem: Risk for Spread of Infection  Goal: Prevent transmission of infectious organism to others  Description: Prevent the transmission of infectious organisms to other patients, staff members, and visitors. Outcome: Progressing Towards Goal     Problem: Patient Education:  Go to Education Activity  Goal: Patient/Family Education  Outcome: Progressing Towards Goal     Problem: Falls - Risk of  Goal: *Absence of Falls  Description: Document Bushra Crystal Fall Risk and appropriate interventions in the flowsheet.   Outcome: Progressing Towards Goal  Note: Fall Risk Interventions:            Medication Interventions: Bed/chair exit alarm         History of Falls Interventions: Bed/chair exit alarm         Problem: Patient Education: Go to Patient Education Activity  Goal: Patient/Family Education  Outcome: Progressing Towards Goal

## 2022-01-20 NOTE — PROGRESS NOTES
Progress Note      1/20/2022 12:17 PM  NAME: Carla Almodovar   MRN:  [id number]   Admit Diagnosis: NSTEMI (non-ST elevated myocardial infarction) [facility]) [I21.4]      Subjective:   Chart reviewed. Discussed with the patient and with the wife. Patient is somewhat more awake and alert. History of neuropathy. Echocardiograph results explained. Review of Systems:    Symptom Y/N Comments  Symptom Y/N Comments   Fever/Chills n   Chest Pain n    Poor Appetite    Edema     Cough    Abdominal Pain n    Sputum    Joint Pain     SOB/VERGARA n   Pruritis/Rash     Nausea/vomit    Other y  hypersomnolence. Diarrhea         Constipation           Could NOT obtain due to:      Objective:        Physical Exam:    Last 24hrs VS reviewed since prior progress note. Most recent are:    Visit Vitals  BP (!) 149/84 (BP 1 Location: Left upper arm, BP Patient Position: At rest;Supine)   Pulse 62   Temp 97.7 °F (36.5 °C)   Resp 18   Ht 5' 11\" (1.803 m)   Wt 122.5 kg (270 lb)   SpO2 96%   BMI 37.66 kg/m²     No intake or output data in the 24 hours ending 01/20/22 1032     General Appearance: Well developed, well nourished, alert & oriented x 3,    no acute distress. Ears/Nose/Mouth/Throat: Hearing grossly normal.  Neck: Supple. Chest: Lungs clear to auscultation bilaterally. Cardiovascular: Regular rate and rhythm, S1,S2 normal, no murmur. Abdomen: Soft, non-tender, bowel sounds are active. Extremities: No edema bilaterally. Skin: Warm and dry. []         Post-cath site without hematoma, bruit, tenderness, or thrill. Distal pulses intact. PMH/SH reviewed - no change compared to H&P    Data Review    Telemetry: normal sinus rhythm     EKG:   []  No new EKG for review    Lab Data Personally Reviewed:    Recent Labs     01/20/22  0729 01/19/22  0220   WBC 7.3 8.4   HGB 12.6 11.2*   HCT 41.8 37.6    198     No results for input(s): INR, PTP, APTT, INREXT, INREXT in the last 72 hours.    Recent Labs     01/20/22  1116 01/19/22  0220 01/18/22  0139    141 139   K 3.6 3.9 4.3    104 102   CO2 28 34* 36*   BUN 16 16 18   CREA 0.61* 0.62* 0.76   GLU 95 112* 107*   CA 8.9 8.5 8.5   MG 2.0  --   --      No results for input(s): CPK, CKNDX, TROIQ in the last 72 hours. No lab exists for component: CPKMB  Lab Results   Component Value Date/Time    Cholesterol, total 163 10/21/2013 08:51 AM    HDL Cholesterol 55 10/21/2013 08:51 AM    LDL, calculated 94 10/21/2013 08:51 AM    Triglyceride 71 10/21/2013 08:51 AM    CHOL/HDL Ratio 5.0 03/03/2009 02:37 PM       No results for input(s): AP, TBIL, TP, ALB, GLOB, GGT, AML, LPSE in the last 72 hours.     No lab exists for component: SGOT, GPT, AMYP, HLPSE  Recent Labs     01/19/22  1220 01/17/22  1840   PH 7.46* 7.32*   PCO2 48* 72*   PO2 73* 137*       Medications Personally Reviewed:    Current Facility-Administered Medications   Medication Dose Route Frequency    potassium chloride (K-DUR, KLOR-CON M20) SR tablet 40 mEq  40 mEq Oral NOW    losartan (COZAAR) tablet 100 mg  100 mg Oral DAILY    pantoprazole (PROTONIX) tablet 40 mg  40 mg Oral ACB    butalbital-acetaminophen-caffeine (FIORICET, ESGIC) -40 mg per tablet 1 Tablet  1 Tablet Oral Q4H PRN    amLODIPine (NORVASC) tablet 5 mg  5 mg Oral DAILY    piperacillin-tazobactam (ZOSYN) 3.375 g in 0.9% sodium chloride (MBP/ADV) 100 mL MBP  3.375 g IntraVENous Q8H    azithromycin (ZITHROMAX) 500 mg in 0.9% sodium chloride 250 mL (VIAL-MATE)  500 mg IntraVENous Q24H    hydrOXYzine pamoate (VISTARIL) capsule 25 mg  25 mg Oral TID PRN    melatonin tablet 3 mg  3 mg Oral QHS PRN    albuterol-ipratropium (DUO-NEB) 2.5 MG-0.5 MG/3 ML  3 mL Nebulization Q6H PRN    albuterol (PROVENTIL HFA, VENTOLIN HFA, PROAIR HFA) inhaler 2 Puff  2 Puff Inhalation Q4H PRN    methylphenidate HCl (RITALIN) tablet 20 mg  20 mg Oral BID    sodium chloride (NS) flush 5-40 mL  5-40 mL IntraVENous Q8H    sodium chloride (NS) flush 5-40 mL 5-40 mL IntraVENous PRN    acetaminophen (TYLENOL) tablet 650 mg  650 mg Oral Q6H PRN    Or    acetaminophen (TYLENOL) suppository 650 mg  650 mg Rectal Q6H PRN    polyethylene glycol (MIRALAX) packet 17 g  17 g Oral DAILY PRN    ondansetron (ZOFRAN ODT) tablet 4 mg  4 mg Oral Q8H PRN    Or    ondansetron (ZOFRAN) injection 4 mg  4 mg IntraVENous Q6H PRN    fondaparinux (ARIXTRA) injection 2.5 mg  2.5 mg SubCUTAneous DAILY           Problem List:   Patient is much more alert and oriented. Troponin I is indeterminate and I do not think this is a presentation of acute coronary syndrome. POEM syndrome. His ejection fraction is normal.  Blood pressure is somewhat high. 1.      Assessment/Plan:   From cardiac viewpoint no further recommendations. Will adjust blood pressure medications. Discussed with the patient's wife. Thank you. 1.          []       High complexity decision making was performed in this patient at high risk for decompensation with multiple organ involvement.     Erick Pantoja MD

## 2022-01-20 NOTE — DISCHARGE INSTRUCTIONS
Patient Education        ACE Inhibitors: Care Instructions  Your Care Instructions     ACE (angiotensin-converting enzyme) inhibitors lower blood pressure. They also treat heart failure and prevent heart attacks and strokes. They block an enzyme that makes blood vessels narrow. As a result, the blood vessels relax and widen. This lowers blood pressure. These medicines also put more water and salt into the urine. This lowers blood pressure too. These medicines are a good choice for people with diabetes. They don't affect blood sugar levels, and they may protect the kidneys. Examples include:  · Benazepril (Lotensin). · Lisinopril (Prinivil, Zestril). · Ramipril (Altace). Before you start taking this medicine, make sure your doctor knows if you take other medicines, especially water pills (diuretics) or potassium tablets. And tell your doctor if you use a salt substitute. You should not take an ACE inhibitor if you are pregnant or planning to become pregnant. You may need regular blood tests. Follow-up care is a key part of your treatment and safety. Be sure to make and go to all appointments, and call your doctor if you are having problems. It's also a good idea to know your test results and keep a list of the medicines you take. How can you care for yourself at home? · Take your medicines exactly as prescribed. Be sure to take your medicine every day. Call your doctor if you think you are having a problem with your medicine. · ACE inhibitors can cause a dry cough. If the cough is bad, talk to your doctor. You may need to try a different medicine. · ACE inhibitors can cause swelling of your lips, tongue, or face. If the swelling is severe, you may need treatment right away. Severe swelling can make it hard to breathe, but this is very rare. · Check with your doctor or pharmacist before you use any other medicines. This includes over-the-counter medicines.  Make sure your doctor knows all of the medicines, vitamins, herbal products, and supplements you take. Taking some medicines together can cause problems. When should you call for help? Call your doctor now or seek immediate medical care if:    · You have swelling of your lips, tongue, or face.     · You think you are having problems with your medicine. Watch closely for changes in your health, and be sure to contact your doctor if you have any problems. Where can you learn more? Go to http://www.gray.com/  Enter [id number] in the search box to learn more about \"ACE Inhibitors: Care Instructions. \"  Current as of: April 29, 2021               Content Version: 13.0  © 4572-9013 [employer]. Care instructions adapted under license by [employer] (which disclaims liability or warranty for this information). If you have questions about a medical condition or this instruction, always ask your healthcare professional. [address] any warranty or liability for your use of this information.

## 2022-01-20 NOTE — PROGRESS NOTES
Pulmonary and Critical Care progress note    Subjective:   Consult Note: 1/20/2022 @no control      Chief Complaint:   Chief Complaint   Patient presents with    Altered mental status        This patient has been seen and evaluated at the request of Favio Tripp NP. Patient seen and examined in ED  Overnight events noted    Standing, walking around in the room  On room air  No difficulty with walking and no significant shortness of breath  Chest x-ray 1/19 reviewed showing improvement in left lower lobe infiltrate    Review of Systems:  A comprehensive review of systems was negative except for that written in the HPI.       Current Facility-Administered Medications   Medication Dose Route Frequency Provider Last Rate Last Admin    losartan (COZAAR) tablet 100 mg  100 mg Oral DAILY Jose Roberto Bales MD   100 mg at 01/20/22 0816    pantoprazole (PROTONIX) tablet 40 mg  40 mg Oral ACB Meir Kaplan MD   40 mg at 01/20/22 0816    butalbital-acetaminophen-caffeine (FIORICET, ESGIC) -40 mg per tablet 1 Tablet  1 Tablet Oral Q4H PRN Jenna Perry PA-C   1 Tablet at 01/18/22 1315    amLODIPine (NORVASC) tablet 5 mg  5 mg Oral DAILY Jose Roberto Bales MD   5 mg at 01/20/22 0816    piperacillin-tazobactam (ZOSYN) 3.375 g in 0.9% sodium chloride (MBP/ADV) 100 mL MBP  3.375 g IntraVENous Q8H Jorge Luis Dang, BEV 25 mL/hr at 01/20/22 0817 3.375 g at 01/20/22 0817    azithromycin (ZITHROMAX) 500 mg in 0.9% sodium chloride 250 mL (VIAL-MATE)  500 mg IntraVENous Q24H Jorge Luis Dang  mL/hr at 01/19/22 1558 500 mg at 01/19/22 1558    hydrOXYzine pamoate (VISTARIL) capsule 25 mg  25 mg Oral TID PRN Jorge Luis Dang NP        melatonin tablet 3 mg  3 mg Oral QHS PRN Jorge Luis Dang NP        albuterol-ipratropium (DUO-NEB) 2.5 MG-0.5 MG/3 ML  3 mL Nebulization Q6H PRN Jorge Luis Dang NP        albuterol (PROVENTIL HFA, VENTOLIN HFA, PROAIR HFA) inhaler 2 Puff  2 Puff Inhalation Q4H PRN Laurence, Jorge Luis CARLTON NP        methylphenidate HCl (RITALIN) tablet 20 mg  20 mg Oral BID Marie Mcduffie MD   20 mg at 01/20/22 0816    sodium chloride (NS) flush 5-40 mL  5-40 mL IntraVENous Q8H Cha, Sheril Carrel, MD   10 mL at 01/19/22 2148    sodium chloride (NS) flush 5-40 mL  5-40 mL IntraVENous PRN Cha, Sheril Carrel, MD        acetaminophen (TYLENOL) tablet 650 mg  650 mg Oral Q6H PRN Marie Mcduffie MD   650 mg at 01/18/22 1315    Or    acetaminophen (TYLENOL) suppository 650 mg  650 mg Rectal Q6H PRN Cha, Sheril Carrel, MD        polyethylene glycol (MIRALAX) packet 17 g  17 g Oral DAILY PRN Cha, Sheril Carrel, MD        ondansetron (ZOFRAN ODT) tablet 4 mg  4 mg Oral Q8H PRN Cha, Sheril Carrel, MD        Or    ondansetron (ZOFRAN) injection 4 mg  4 mg IntraVENous Q6H PRN Marie Mcduffie MD        fondaparinux (ARIXTRA) injection 2.5 mg  2.5 mg SubCUTAneous DAILY Marie Mcduffie MD   2.5 mg at 01/20/22 0818     Current Outpatient Medications   Medication Sig Dispense Refill    acetaminophen (TYLENOL) 325 mg tablet Take 2 Tablets by mouth every six (6) hours as needed for Pain or Fever. 60 Tablet 0    albuterol (PROVENTIL HFA, VENTOLIN HFA, PROAIR HFA) 90 mcg/actuation inhaler Take 2 Puffs by inhalation every four (4) hours as needed for Wheezing or Shortness of Breath. 18 g 2    [START ON 1/21/2022] amLODIPine (NORVASC) 5 mg tablet Take 1 Tablet by mouth daily. 30 Tablet 0    butalbital-acetaminophen-caffeine (FIORICET, ESGIC) -40 mg per tablet Take 1 Tablet by mouth every four (4) hours as needed for Headache or Migraine. 30 Tablet 0    [START ON 1/21/2022] losartan (COZAAR) 100 mg tablet Take 1 Tablet by mouth daily. 30 Tablet 0    melatonin 3 mg tablet Take 1 Tablet by mouth nightly as needed for Insomnia. 60 Tablet 0    levoFLOXacin (Levaquin) 500 mg tablet Take 1 Tablet by mouth daily. 9 Tablet 0    pantoprazole (PROTONIX) 40 mg tablet Take 1 Tablet by mouth two (2) times a day.  180 Tablet 3    methylphenidate HCl (Metadate CD) 40 mg BP30 Take 40 mg by mouth every morning. Max Daily Amount: 40 mg. 90 Capsule 0    sildenafil citrate (Viagra) 100 mg tablet Take 1 Tablet by mouth as needed for Erectile Dysfunction. 6 Tablet 1          Allergies   Allergen Reactions    Heparin (Bovine) Other (comments)     Questionable per patient ,back lesions. Objective:     Blood pressure (!) 149/84, pulse 62, temperature 97.7 °F (36.5 °C), resp. rate 18, height 5' 11\" (1.803 m), weight 122.5 kg (270 lb), SpO2 96 %. Temp (24hrs), Av °F (36.7 °C), Min:97.7 °F (36.5 °C), Max:98.3 °F (36.8 °C)      Intake and Output:  Current Shift: No intake/output data recorded. Last 3 Shifts: No intake/output data recorded. No intake or output data in the 24 hours ending 22 1300     Physical Exam:     General: Lying in bed comfortably, no acute distress on room air  Eye: Reactive, symmetric  Throat and Neck: Supple  Lung: Reduced air entry bilaterally with prolonged exhalation but no wheezing. Crackles in left mid and lower lung zone. Heart: S1+S2. No murmurs  Abdomen: soft, non-tender. Bowel sounds normal. No masses; obese  Extremities: No edema  : Not done  Skin: No cyanosis  Neurologic: A & O x3.   Grossly nonfocal  Psychiatric: Appropriate affect; coherent      Lab/Data Review:    Recent Results (from the past 24 hour(s))   CBC W/O DIFF    Collection Time: 22  7:29 AM   Result Value Ref Range    WBC 7.3 4.1 - 11.1 K/uL    RBC 5.71 (H) 4.10 - 5.70 M/uL    HGB 12.6 12.1 - 17.0 g/dL    HCT 41.8 36.6 - 50.3 %    MCV 73.2 (L) 80.0 - 99.0 FL    MCH 22.1 (L) 26.0 - 34.0 PG    MCHC 30.1 30.0 - 36.5 g/dL    RDW 20.3 (H) 11.5 - 14.5 %    PLATELET 396 983 - 239 K/uL    NRBC 0.0 0.0  WBC    ABSOLUTE NRBC 0.00 0.00 - 5.72 K/uL   METABOLIC PANEL, BASIC    Collection Time: 22  7:29 AM   Result Value Ref Range    Sodium 140 136 - 145 mmol/L    Potassium 3.6 3.5 - 5.1 mmol/L    Chloride 108 97 - 108 mmol/L    CO2 28 21 - 32 mmol/L    Anion gap 4 (L) 5 - 15 mmol/L    Glucose 95 65 - 100 mg/dL    BUN 16 6 - 20 mg/dL    Creatinine 0.61 (L) 0.70 - 1.30 mg/dL    BUN/Creatinine ratio 26 (H) 12 - 20      GFR est AA >60 >60 ml/min/1.73m2    GFR est non-AA >60 >60 ml/min/1.73m2    Calcium 8.9 8.5 - 10.1 mg/dL   MAGNESIUM    Collection Time: 01/20/22  7:29 AM   Result Value Ref Range    Magnesium 2.0 1.6 - 2.4 mg/dL       XR CHEST PORT   Final Result      CT HEAD WO CONT   Final Result   No acute intracranial abnormality. XR CHEST PORT   Final Result   Suspected opacities in the left lung base are nonspecific but could represent   pneumonia in the appropriate clinical setting. Short interval follow-up is   recommended to ensure resolution and exclude other causes of opacification. CT Results  (Last 48 hours)    None            Assessment:     1. Acute respiratory failure with hypercapnia and hypoxia  2. Left lower lobe pneumonia  3. Altered mental status/hypersomnia  4. Cocaine abuse  5. POEMS syndrome  6. Snoring/apnea  7.   Asthma    Plan:     Clinically improved    Now on room air  Patient ABG showed significant hypercapnia with PCO2 of 72  Repeat ABGs after treatment showed improvement with PCO2 of 48    Left lower lobe pneumonia on chest x-ray  Continue broad-spectrum antibiotic Zosyn and azithromycin  Cultures to be sent  Further changes in antibiotics based on clinical response and culture results  Repeat chest x-ray 1/19 showed significant improvement in left lower lobe infiltrate    Patient has urine tox positive for cocaine  Denies any cocaine use  Monitor neurologically    Continue nebulizers for asthma    DVT and GI prophylaxis    Out of bed to chair with support    Much improved  Ambulating without difficulty on room air  Chest x-ray and ABGs improved  Stable for discharge from my standpoint to complete course of antibiotics  Will follow-up with me in outpatient clinic in 2 to 3 weeks for PFTs, oximetry, and repeat imaging    Questions of patient were answered at bedside in detail  Case discussed in detail with RN, RT, and care team  Thank you for involving me in the care of this patient  I will follow with you closely during hospitalization    Time spent more than 30 minutes in direct patient care with no overlap reviewing results and records, decision making, and answering questions.       Yvonne Lanier MD  [facility]  1/20/2022  4:00 PM

## 2022-01-23 LAB
BACTERIA SPEC CULT: NORMAL
SPECIAL REQUESTS,SREQ: NORMAL

## 2022-01-26 NOTE — ADT AUTH CERT NOTES
1/18    Pneumonia - Care Day 3 (1/18/2022) by Gloria Saint       Review Status Review Entered   Completed 1/20/2022 11:29      Criteria Review      Care Day: 3 Care Date: 1/18/2022 Level of Care: Inpatient Floor    Guideline Day 2    Clinical Status    ( ) * No CO2 retention or acidosis    1/20/2022 11:29:25 EST by Gloria Saint      C02 36    ( ) * No requirement for mechanical ventilation    1/20/2022 11:29:25 EST by Gloria Saint      RT- BIPAP Continuously with breaks for meals    (X) * Hypotension absent    1/20/2022 11:29:25 EST by Gloria Saint      /92, P 69    (X) * Afebrile or fever improved    1/20/2022 11:29:25 EST by Gloria Saint      T 98.2    ( ) * No hypoxia on room air or oxygenation improved    1/20/2022 11:29:25 EST by Gloria Saint      02 SAT 98% 3L NC, 96% 1L NC    (X) * Mental status improved or at baseline    Activity    ( ) * Increased activity    1/20/2022 11:29:25 EST by Gloria Saint      NOT DOC'D    Routes    (X) Oral hydration    (X) Oral medications    1/20/2022 11:29:25 EST by Gloria Saint      TYLENOL 650MG PO Q 6 HR PRN X2, NORVASC 5MG PO QD, FIORICET 1 TAB PO Q 4 HR PRN X1,  DUO ENBS DC'D, PROTONIX 40MG PO BID    (X) Usual diet    1/20/2022 11:29:25 EST by Gloria Saint      Regular; Low Sodium (2 gm)    Interventions    (X) Possible oxygen    1/20/2022 11:29:25 EST by Gloria Saint      3L NC & 1L NC    Medications    (X) IV or oral antibiotics    1/20/2022 11:29:25 EST by Gloria Saint      ZITHROMAX 500MG IV Q 24 HR, ZOSYN 3.375G IV Q 8 HR    * Milestone   Additional Notes   Hospitalist Progress Note      Patient states that he has a headache that has not been resolved with Tylenol.  Admits to a yellow to green productive cough.  Some shortness of breath.  Denies any chest pain.  Wife at bedside      EXAM:  Constitutional: No acute distress   Skin: Extremities and face reveal no rashes. HEENT: Sclerae anicteric. Extra-occular muscles are intact.  No oral ulcers. The neck is supple and no masses. Cardiovascular: Regular rate and rhythm. Respiratory:  Nonlabored, diminished   GI: Abdomen nondistended, soft, and nontender. Normal active bowel sounds. Musculoskeletal: No pitting edema of the lower legs. Able to move all ext   Neurological:  Lethargic Patient is alert and oriented. Cranial nerves II-XII grossly intact   Psychiatric: Mood appears appropriate      WBC 11.3, HGB 10.2., HCT 34.7, C02 36, GLUC 107, TROP-HI SENS 140 & 98,       EKG: Normal sinus rhythm Non-specific intra-ventricular conduction delay      Assessment:   1.  Septic encephalopathy   2.  Acute hypoxic respiratory failure secondary to community-acquired pneumonia   3.  Non-STEMI type II       Plan:   1.  CT of the head unremarkable.  Patient is more awake and alert.  Admits to a headache.  We will add on Fioricet   2.  Patient currently on 3 L oxygen nasal cannula.  We will continue to wean.  Chest x-ray shows signs concerning for pneumonia.  Pulmonology consulted.  ABG reviewed.  On IV Zosyn and Azithromycin. 3.  Troponins indeterminately elevated.  Chest pain free.  2D echo shows normal EF systolic and diastolic function.  Cardiology consulted.    4.  CBC and BMP in the AM        Dispo: 24-48 hours.  Suspect home with no needs.  Barriers include improvement in sepsis, weaning of oxygen, clearance from consulting doctors         Pulmonary and Critical Care progress note          Currently on 3 L nasal cannula oxygen       ABGs today show pH of 7.32, PCO2 72, PO2 137, bicarb 32, saturation 100% on room air       Assessment:       1.  Acute respiratory failure with hypercapnia and hypoxia   2.  Left lower lobe pneumonia   3.  Altered mental status/hypersomnia   4.  Cocaine abuse   5.  POEMS syndrome   6.  Snoring/apnea   7.  Asthma       Plan:       Patient seen in the ED   Being admitted to the hospital   Will be watched here closely       Currently on 3 L nasal cannula oxygen   ABG showed significant hypercapnia   Start BiPAP 12/6, rate of 16, FiO2 30%   Further changes in BiPAP settings based on clinical response and ABG results   Will give breaks for meals from BiPAP       Left lower lobe pneumonia on chest x-ray   Continue broad-spectrum antibiotic Zosyn and azithromycin   Cultures to be sent   Further changes in antibiotics based on clinical response and culture results       Patient has urine tox positive for cocaine   Denies any cocaine use   Monitor neurologically       Continue nebulizers for asthma       DVT and GI prophylaxis          CARDIOVASC NOTE:      Patient is somewhat more awake and alert but still somewhat somnolent.  History of neuropathy.  Echocardiograph results explained.       EXAM: Cardiovascular: Regular rate and rhythm, S1,S2 normal, no murmur      Problem List:      Patient is still hypersomnolent though when awake is much more alert and oriented.  Troponin I is indeterminate and I do not think this is a presentation of acute coronary syndrome. POEM syndrome.  His ejection fraction is normal.  Blood pressure is somewhat high. 1.        Assessment/Plan:           From cardiac viewpoint no further recommendations.  Will adjust blood pressure medications.  Discussed with the patient's wife. Luis Alberto whitney.

## 2022-02-03 ENCOUNTER — TELEPHONE (OUTPATIENT)
Dept: FAMILY MEDICINE CLINIC | Age: 57
End: 2022-02-03

## 2022-02-03 NOTE — TELEPHONE ENCOUNTER
----- Message from [facility] sent at 2/3/2022  2:40 PM EST -----  Subject: Message to Provider    QUESTIONS  Information for Provider? Patient is calling for hospital follow up he   said to send you a message because he didn't know when you wanted to see   him I found [facility] in person 2/22 he said to ask you what   appointment he should take please call back   ---------------------------------------------------------------------------  --------------  [address]  What is the best way for the office to contact you? OK to leave message on   voicemail  Preferred Call Back Phone Number? [phone]  ---------------------------------------------------------------------------  --------------  SCRIPT ANSWERS  Relationship to Patient?  Self

## 2022-02-07 ENCOUNTER — OFFICE VISIT (OUTPATIENT)
Dept: FAMILY MEDICINE CLINIC | Age: 57
End: 2022-02-07
Payer: COMMERCIAL

## 2022-02-07 VITALS
SYSTOLIC BLOOD PRESSURE: 154 MMHG | HEART RATE: 63 BPM | WEIGHT: 260 LBS | TEMPERATURE: 98.1 F | DIASTOLIC BLOOD PRESSURE: 89 MMHG | HEIGHT: 71 IN | BODY MASS INDEX: 36.4 KG/M2 | OXYGEN SATURATION: 99 % | RESPIRATION RATE: 16 BRPM

## 2022-02-07 DIAGNOSIS — I10 ESSENTIAL HYPERTENSION: ICD-10-CM

## 2022-02-07 DIAGNOSIS — E88.09 POEMS SYNDROME: ICD-10-CM

## 2022-02-07 DIAGNOSIS — J18.9 PNEUMONIA DUE TO INFECTIOUS ORGANISM, UNSPECIFIED LATERALITY, UNSPECIFIED PART OF LUNG: Primary | ICD-10-CM

## 2022-02-07 PROCEDURE — 99214 OFFICE O/P EST MOD 30 MIN: CPT | Performed by: FAMILY MEDICINE

## 2022-02-07 NOTE — PROGRESS NOTES
Chief Complaint   Patient presents with   [facility] Follow Up     [facility] ED 1/16/22    Pneumonia     Completed Levaquin     1. Have you been to the ER, urgent care clinic since your last visit? Hospitalized since your last visit? Yes Where: [facility] ED 1/16/22    2. Have you seen or consulted any other health care providers outside of the [address] [place] [address] since your last visit? Include any pap smears or colon screening. Yes Where: [address]             Chief Complaint   Patient presents with   [facility] Follow Up     [facility] ED 1/16/22    Pneumonia     Completed Levaquin     He is a 55 y.o. male who presents for evalution. Reviewed PmHx, RxHx, FmHx, SocHx, AllgHx and updated and dated in the chart. Patient Active Problem List    Diagnosis    [facility]I (non-ST elevated [facility] infarction) [facility]    PUD (peptic ulcer disease)    Syncope    Acute blood loss anemia    GI hemorrhage    Severe obesity ([facility]    ADD (attention deficit disorder)    Insomnia    POEMS syndrome    Neuropathy associated with endocrine disorder ([facility]       Review of Systems - negative except as listed above in the HPI    Objective:     Vitals:    02/07/22 1344   BP: (!) 154/89   Pulse: 63   Resp: 16   Temp: 98.1 °F (36.7 °C)   TempSrc: Oral   SpO2: 99%   Weight: 260 lb (117.9 kg)   Height: 5' 11\" (1.803 m)         Assessment/ Plan:   Diagnoses and all orders for this visit:    1. Pneumonia due to infectious organism, unspecified laterality, unspecified part of lung  Patient is status post discharge from hospital. Patient feels back to baseline and in good spirits with no difficulty breathing. Complete current care. 2. POEMS syndrome  Stable  3. Essential hypertension  Increased today, patient has not been taking the medicine for the last couple days. Encourage compliance with medications. I have discussed the diagnosis with the patient and the intended plan as seen in the above orders.   The patient understands and agrees with the plan. The patient has received an after-visit summary and questions were answered concerning future plans. Medication Side Effects and Warnings were discussed with patient  Patient Labs were reviewed and or requested:  Patient Past Records were reviewed and or requested    Valentino Mccarthy M.D. There are no Patient Instructions on file for this visit.

## 2022-03-18 PROBLEM — K27.9 PUD (PEPTIC ULCER DISEASE): Status: ACTIVE | Noted: 2021-08-11

## 2022-03-18 PROBLEM — R55 SYNCOPE: Status: ACTIVE | Noted: 2021-08-07

## 2022-03-18 PROBLEM — D62 ACUTE BLOOD LOSS ANEMIA: Status: ACTIVE | Noted: 2021-08-07

## 2022-03-19 PROBLEM — K92.2 GI HEMORRHAGE: Status: ACTIVE | Noted: 2021-08-05

## 2022-03-19 PROBLEM — I21.4 NSTEMI (NON-ST ELEVATED MYOCARDIAL INFARCTION) (HCC): Status: ACTIVE | Noted: 2022-01-16

## 2022-03-19 PROBLEM — F98.8 ADD (ATTENTION DEFICIT DISORDER): Status: ACTIVE | Noted: 2017-07-05

## 2022-03-19 PROBLEM — E66.01 SEVERE OBESITY (HCC): Status: ACTIVE | Noted: 2019-05-01

## 2022-04-19 DIAGNOSIS — N52.9 ERECTILE DYSFUNCTION, UNSPECIFIED ERECTILE DYSFUNCTION TYPE: ICD-10-CM

## 2022-04-19 RX ORDER — SILDENAFIL 100 MG/1
100 TABLET, FILM COATED ORAL AS NEEDED
Qty: 6 TABLET | Refills: 1 | Status: SHIPPED | OUTPATIENT
Start: 2022-04-19 | End: 2022-06-23 | Stop reason: SDUPTHER

## 2022-06-23 DIAGNOSIS — N52.9 ERECTILE DYSFUNCTION, UNSPECIFIED ERECTILE DYSFUNCTION TYPE: ICD-10-CM

## 2022-06-23 RX ORDER — CARBINOXAMINE MALEATE 4 MG/1
4 TABLET ORAL 2 TIMES DAILY
Qty: 180 TABLET | Refills: 3 | Status: SHIPPED | OUTPATIENT
Start: 2022-06-23

## 2022-06-23 RX ORDER — SILDENAFIL 100 MG/1
100 TABLET, FILM COATED ORAL AS NEEDED
Qty: 6 TABLET | Refills: 1 | Status: SHIPPED | OUTPATIENT
Start: 2022-06-23 | End: 2022-08-01 | Stop reason: SDUPTHER

## 2022-06-23 RX ORDER — PANTOPRAZOLE SODIUM 40 MG/1
40 TABLET, DELAYED RELEASE ORAL 2 TIMES DAILY
Qty: 180 TABLET | Refills: 3 | Status: SHIPPED | OUTPATIENT
Start: 2022-06-23

## 2022-07-11 ENCOUNTER — E-VISIT (OUTPATIENT)
Dept: FAMILY MEDICINE CLINIC | Age: 57
End: 2022-07-11
Payer: COMMERCIAL

## 2022-07-11 DIAGNOSIS — H66.90 EAR INFECTION: Primary | ICD-10-CM

## 2022-07-11 PROCEDURE — 99421 OL DIG E/M SVC 5-10 MIN: CPT | Performed by: FAMILY MEDICINE

## 2022-07-11 RX ORDER — AZITHROMYCIN 250 MG/1
TABLET, FILM COATED ORAL
Qty: 6 TABLET | Refills: 0 | Status: SHIPPED | OUTPATIENT
Start: 2022-07-11

## 2022-07-11 NOTE — PROGRESS NOTES
HPI: as per patient provided history  Exam: N/A (electronic visit)  ASSESSMENT/PLAN:  1. Ear infection    - azithromycin (ZITHROMAX) 250 mg tablet; Take two tablets today then one tablet daily  Dispense: 6 Tablet; Refill: 0      Patient instructed to call the office if worsens, or fails to improve as anticipated. 5-10 minutes were spent on the digital evaluation and management of this patient.

## 2022-08-01 ENCOUNTER — OFFICE VISIT (OUTPATIENT)
Dept: FAMILY MEDICINE CLINIC | Age: 57
End: 2022-08-01
Payer: COMMERCIAL

## 2022-08-01 VITALS
OXYGEN SATURATION: 99 % | WEIGHT: 254 LBS | SYSTOLIC BLOOD PRESSURE: 130 MMHG | RESPIRATION RATE: 15 BRPM | BODY MASS INDEX: 35.56 KG/M2 | HEART RATE: 79 BPM | DIASTOLIC BLOOD PRESSURE: 80 MMHG | TEMPERATURE: 97.9 F | HEIGHT: 71 IN

## 2022-08-01 DIAGNOSIS — M76.61 ACHILLES BURSITIS OF RIGHT LOWER EXTREMITY: Primary | ICD-10-CM

## 2022-08-01 DIAGNOSIS — N52.9 ERECTILE DYSFUNCTION, UNSPECIFIED ERECTILE DYSFUNCTION TYPE: ICD-10-CM

## 2022-08-01 DIAGNOSIS — F98.8 ATTENTION DEFICIT DISORDER, UNSPECIFIED HYPERACTIVITY PRESENCE: ICD-10-CM

## 2022-08-01 PROCEDURE — 99213 OFFICE O/P EST LOW 20 MIN: CPT | Performed by: FAMILY MEDICINE

## 2022-08-01 RX ORDER — SILDENAFIL 100 MG/1
100 TABLET, FILM COATED ORAL AS NEEDED
Qty: 12 TABLET | Refills: 5 | Status: SHIPPED | OUTPATIENT
Start: 2022-08-01

## 2022-08-01 RX ORDER — METHYLPHENIDATE HYDROCHLORIDE 40 MG/1
40 CAPSULE, EXTENDED RELEASE ORAL
Qty: 90 CAPSULE | Refills: 0 | Status: SHIPPED | OUTPATIENT
Start: 2022-08-01

## 2022-08-01 NOTE — PROGRESS NOTES
Chief Complaint   Patient presents with    Attention Deficit Disorder     Med refill    Ear Pain     Left ear    Foot Pain     Right Heel     1. Have you been to the ER, urgent care clinic since your last visit? Hospitalized since your last visit? Yes [facility]    2. Have you seen or consulted any other health care providers outside of the [address] [place] [address] since your last visit? Include any pap smears or colon screening. No             Chief Complaint   Patient presents with    Attention Deficit Disorder     Med refill    Ear Pain     Left ear    Foot Pain     Right Heel     He is a 65 y.o. male who presents for evalution. Reviewed PmHx, RxHx, FmHx, SocHx, AllgHx and updated and dated in the chart. Patient Active Problem List    Diagnosis    NSTEMI (non-ST elevated myocardial infarction) ([facility]    PUD (peptic ulcer disease)    Syncope    Acute blood loss anemia    GI hemorrhage    Severe obesity ([facility]    ADD (attention deficit disorder)    Insomnia    POEMS syndrome    Neuropathy associated with endocrine disorder ([facility]       Review of Systems - negative except as listed above in the HPI    Objective:     Vitals:    08/01/22 1628 08/01/22 1641   BP: (!) 147/88 130/80   Pulse: 79    Resp: 15    Temp: 97.9 °F (36.6 °C)    TempSrc: Oral    SpO2: 99%    Weight: 254 lb (115.2 kg)    Height: 5' 11\" (1.803 m)          Assessment/ Plan:   Diagnoses and all orders for this visit:    1. Achilles bursitis of right lower extremity  -     REFERRAL TO ORTHOPEDICS  -R distal achilles area tender and swollen, sxs for 3 months    2. Attention deficit disorder, unspecified hyperactivity presence  -     methylphenidate HCl (Metadate CD) 40 mg BP30; Take 40 mg by mouth every morning. Max Daily Amount: 40 mg.             I have discussed the diagnosis with the patient and the intended plan as seen in the above orders. The patient understands and agrees with the plan.  The patient has received an after-visit summary and questions were answered concerning future plans. Medication Side Effects and Warnings were discussed with patient  Patient Labs were reviewed and or requested:  Patient Past Records were reviewed and or requested    Alonso Huertas M.D. There are no Patient Instructions on file for this visit.

## 2022-08-01 NOTE — PROGRESS NOTES
Chief Complaint   Patient presents with    Attention Deficit Disorder     Med refill    Ear Pain     Left ear    Foot Pain     Right Heel     1. Have you been to the ER, urgent care clinic since your last visit? Hospitalized since your last visit? Yes [facility]    2. Have you seen or consulted any other health care providers outside of the [address] [place] [address] since your last visit? Include any pap smears or colon screening.  No

## 2022-12-06 ENCOUNTER — OFFICE VISIT (OUTPATIENT)
Dept: FAMILY MEDICINE CLINIC | Age: 57
End: 2022-12-06
Payer: COMMERCIAL

## 2022-12-06 VITALS
OXYGEN SATURATION: 100 % | WEIGHT: 269 LBS | SYSTOLIC BLOOD PRESSURE: 129 MMHG | RESPIRATION RATE: 16 BRPM | DIASTOLIC BLOOD PRESSURE: 83 MMHG | HEIGHT: 71 IN | TEMPERATURE: 97.9 F | HEART RATE: 62 BPM | BODY MASS INDEX: 37.66 KG/M2

## 2022-12-06 DIAGNOSIS — I10 ESSENTIAL HYPERTENSION: ICD-10-CM

## 2022-12-06 DIAGNOSIS — R13.10 DYSPHAGIA, UNSPECIFIED TYPE: Primary | ICD-10-CM

## 2022-12-06 DIAGNOSIS — F98.8 ATTENTION DEFICIT DISORDER, UNSPECIFIED HYPERACTIVITY PRESENCE: ICD-10-CM

## 2022-12-06 DIAGNOSIS — N52.9 ERECTILE DYSFUNCTION, UNSPECIFIED ERECTILE DYSFUNCTION TYPE: ICD-10-CM

## 2022-12-06 PROCEDURE — 99213 OFFICE O/P EST LOW 20 MIN: CPT | Performed by: FAMILY MEDICINE

## 2022-12-06 PROCEDURE — 3078F DIAST BP <80 MM HG: CPT | Performed by: FAMILY MEDICINE

## 2022-12-06 PROCEDURE — 3074F SYST BP LT 130 MM HG: CPT | Performed by: FAMILY MEDICINE

## 2022-12-06 RX ORDER — METHYLPHENIDATE HYDROCHLORIDE 40 MG/1
40 CAPSULE, EXTENDED RELEASE ORAL DAILY
Qty: 30 CAPSULE | Refills: 0 | Status: SHIPPED | OUTPATIENT
Start: 2023-01-05 | End: 2023-02-03

## 2022-12-06 RX ORDER — METHYLPHENIDATE HYDROCHLORIDE 40 MG/1
40 CAPSULE, EXTENDED RELEASE ORAL DAILY
Qty: 30 CAPSULE | Refills: 0 | Status: SHIPPED | OUTPATIENT
Start: 2022-12-06 | End: 2023-01-04

## 2022-12-06 RX ORDER — METHYLPHENIDATE HYDROCHLORIDE 40 MG/1
40 CAPSULE, EXTENDED RELEASE ORAL DAILY
Qty: 30 CAPSULE | Refills: 0 | Status: SHIPPED | OUTPATIENT
Start: 2023-02-04 | End: 2023-03-05

## 2022-12-06 RX ORDER — SILDENAFIL 100 MG/1
100 TABLET, FILM COATED ORAL AS NEEDED
Qty: 12 TABLET | Refills: 5 | Status: SHIPPED | OUTPATIENT
Start: 2022-12-06

## 2022-12-06 NOTE — PROGRESS NOTES
Chief Complaint   Patient presents with    Dysphagia    Attention Deficit Disorder     Med refill     1. Have you been to the ER, urgent care clinic since your last visit? Hospitalized since your last visit? Yes Where: NO    2. Have you seen or consulted any other health care providers outside of the [address] [place] [address] since your last visit? Include any pap smears or colon screening.  Yes Where: Podiatry

## 2022-12-06 NOTE — PROGRESS NOTES
Chief Complaint   Patient presents with    Dysphagia    Attention Deficit Disorder     Med refill     1. Have you been to the ER, urgent care clinic since your last visit? Hospitalized since your last visit? Yes Where: NO    2. Have you seen or consulted any other health care providers outside of the [address] [place] [address] since your last visit? Include any pap smears or colon screening. Yes Where: Podiatry           Chief Complaint   Patient presents with    Dysphagia    Attention Deficit Disorder     Med refill     He is a 65 y.o. male who presents for evalution. Reviewed PmHx, RxHx, FmHx, SocHx, AllgHx and updated and dated in the chart. Patient Active Problem List    Diagnosis    Essential hypertension    Dysphagia    NSTEMI (non-ST elevated myocardial infarction) ([facility]    PUD (peptic ulcer disease)    Syncope    Acute blood loss anemia    GI hemorrhage    Severe obesity ([facility]    ADD (attention deficit disorder)    Insomnia    POEMS syndrome    Neuropathy associated with endocrine disorder ([facility]       Review of Systems - negative except as listed above in the HPI    Objective:     Vitals:    12/06/22 1534   BP: 129/83   Pulse: 62   Resp: 16   Temp: 97.9 °F (36.6 °C)   TempSrc: Oral   SpO2: 100%   Weight: 269 lb (122 kg)   Height: 5' 11\" (1.803 m)         Assessment/ Plan:   Diagnoses and all orders for this visit:    1. Dysphagia, unspecified type  -     REFERRAL TO GASTROENTEROLOGY  Patient having difficulty swallowing solids for over 2 years and progressively getting worse. Refer for endoscopy. 2. Essential hypertension  At goal  3. Erectile dysfunction, unspecified erectile dysfunction type  -     sildenafil citrate (VIAGRA) 100 mg tablet; Take 1 Tablet by mouth as needed for Erectile Dysfunction. 4. Attention deficit disorder, unspecified hyperactivity presence  -     methylphenidate HCl 40 mg BP30; Take 40 mg by mouth daily for 29 days.  Max Daily Amount: 40 mg.  -     methylphenidate HCl 40 mg BP30; Take 40 mg by mouth daily for 29 days. Max Daily Amount: 40 mg.  -     methylphenidate HCl 40 mg BP30; Take 40 mg by mouth daily for 29 days. Max Daily Amount: 40 mg.             I have discussed the diagnosis with the patient and the intended plan as seen in the above orders. The patient understands and agrees with the plan. The patient has received an after-visit summary and questions were answered concerning future plans. Medication Side Effects and Warnings were discussed with patient  Patient Labs were reviewed and or requested:  Patient Past Records were reviewed and or requested    Dominic Motta M.D. There are no Patient Instructions on file for this visit.

## 2022-12-07 RX ORDER — SILDENAFIL 100 MG/1
100 TABLET, FILM COATED ORAL AS NEEDED
Qty: 6 TABLET | Refills: 1 | Status: SHIPPED | OUTPATIENT
Start: 2022-12-07

## 2022-12-29 ENCOUNTER — HOSPITAL ENCOUNTER (EMERGENCY)
Age: 57
Discharge: HOME | End: 2022-12-30
Attending: EMERGENCY MEDICINE
Payer: COMMERCIAL

## 2022-12-29 VITALS
TEMPERATURE: 97.9 F | BODY MASS INDEX: 37.8 KG/M2 | HEIGHT: 71 IN | OXYGEN SATURATION: 98 % | SYSTOLIC BLOOD PRESSURE: 151 MMHG | HEART RATE: 86 BPM | WEIGHT: 270 LBS | DIASTOLIC BLOOD PRESSURE: 75 MMHG | RESPIRATION RATE: 20 BRPM

## 2022-12-29 DIAGNOSIS — L03.115 CELLULITIS OF RIGHT LOWER EXTREMITY: Primary | ICD-10-CM

## 2022-12-29 LAB — D DIMER PPP FEU-MCNC: 0.59 UG/ML(FEU)

## 2022-12-29 PROCEDURE — 99284 EMERGENCY DEPT VISIT MOD MDM: CPT

## 2022-12-29 PROCEDURE — 36415 COLL VENOUS BLD VENIPUNCTURE: CPT

## 2022-12-29 PROCEDURE — 85379 FIBRIN DEGRADATION QUANT: CPT

## 2022-12-30 ENCOUNTER — APPOINTMENT (OUTPATIENT)
Dept: ULTRASOUND IMAGING | Age: 57
End: 2022-12-30
Attending: EMERGENCY MEDICINE
Payer: COMMERCIAL

## 2022-12-30 PROCEDURE — 93971 EXTREMITY STUDY: CPT

## 2022-12-30 RX ORDER — SULFAMETHOXAZOLE AND TRIMETHOPRIM 800; 160 MG/1; MG/1
1 TABLET ORAL 2 TIMES DAILY
Qty: 14 TABLET | Refills: 0 | Status: SHIPPED | OUTPATIENT
Start: 2022-12-30 | End: 2023-01-06

## 2022-12-30 NOTE — ED TRIAGE NOTES
PT reports finding a spot on his R leg that feels warm. PT reports the spot hurts when he touches it.

## 2022-12-30 NOTE — ED PROVIDER NOTES
42-year-old male with swelling and induration of area in the right popliteal region. No history of DVT. Takes no anticoagulant. No fever or chills and no skin injury. He has had this area on the back of his leg for several days. The history is provided by the patient. Leg Pain   The current episode started more than 2 days ago. The problem occurs constantly. The problem has been gradually worsening. The pain is present in the right knee. The pain is mild. Pertinent negatives include no numbness, full range of motion, no stiffness, no tingling, no itching and no back pain. He has tried nothing for the symptoms. There has been no history of extremity trauma.       Past Medical History:   Diagnosis Date    Chronic pain     bilateral feet,neuropathy    Neuropathy associated with endocrine disorder ([facility] 3/11/2010    Other ill-defined conditions(799.89) 2001    POEMS Syndrome, seen at [address] [place] [address] 3/11/2010       Past Surgical History:   Procedure Laterality Date    BIOPSY LIVER      CELL COUNT, CSF      x 2    COLONOSCOPY      Upper and Lower     FOOT/TOES SURGERY PROC UNLISTED      Remove Nerve from Left foot     HX GI      liver stent    HX ORTHOPAEDIC      MRI BRAIN W WO CONT      STENT INSERTION      Liver to Kidneys          Family History:   Family history unknown: Yes       Social History     Socioeconomic History    Marital status:      Spouse name: Not on file    Number of children: Not on file    Years of education: Not on file    Highest education level: Not on file   Occupational History    Not on file   Tobacco Use    Smoking status: Never    Smokeless tobacco: Never   Vaping Use    Vaping Use: Never used   Substance and Sexual Activity    Alcohol use: Yes    Drug use: No    Sexual activity: Yes     Partners: Female   Other Topics Concern    Not on file   Social History Narrative    Not on file     Social Determinants of Health     Financial Resource Strain: Not on file Food Insecurity: Not on file   Transportation Needs: Not on file   Physical Activity: Not on file   Stress: Not on file   Social Connections: Not on file   Intimate Partner Violence: Not on file   Housing Stability: Not on file         ALLERGIES: Heparin (bovine)    Review of Systems   Constitutional:  Negative for chills and fever. HENT:  Negative for congestion, rhinorrhea, sneezing and sore throat. Eyes:  Negative for redness and visual disturbance. Respiratory:  Negative for shortness of breath. Cardiovascular:  Negative for chest pain and leg swelling. Gastrointestinal:  Negative for abdominal pain, nausea and vomiting. Genitourinary:  Negative for difficulty urinating and frequency. Musculoskeletal:  Negative for back pain, myalgias, neck stiffness and stiffness. Skin:  Negative for itching and rash. Neurological:  Negative for dizziness, tingling, syncope, weakness, numbness and headaches. Hematological:  Negative for adenopathy. All other systems reviewed and are negative. Vitals:    12/29/22 2057   BP: (!) 151/75   Pulse: 86   Resp: 20   Temp: 97.9 °F (36.6 °C)   SpO2: 98%   Weight: 122.5 kg (270 lb)   Height: 5' 11\" (1.803 m)            Physical Exam  Vitals and nursing note reviewed. Constitutional:       Appearance: Normal appearance. He is well-developed. HENT:      Head: Normocephalic and atraumatic. Cardiovascular:      Rate and Rhythm: Normal rate and regular rhythm. Pulses: Normal pulses. Heart sounds: Normal heart sounds. Pulmonary:      Effort: Pulmonary effort is normal. No respiratory distress. Breath sounds: Normal breath sounds. Chest:      Chest wall: No tenderness. Abdominal:      General: Bowel sounds are normal.      Palpations: Abdomen is soft. Tenderness: There is no abdominal tenderness. There is no guarding or rebound.    Musculoskeletal:      Cervical back: Full passive range of motion without pain, normal range of motion and neck supple. Skin:     General: Skin is warm and dry. Findings: No erythema or rash. Comments: Redness and induration of the area of the skin behind the right knee measuring approximately 7 cm x 4 cm. No abscess. No palpable cord. No distal swelling. Neurological:      Mental Status: He is alert and oriented to person, place, and time. Psychiatric:         Speech: Speech normal.         Behavior: Behavior normal.         Thought Content: Thought content normal.         Judgment: Judgment normal.        MDM  Number of Diagnoses or Management Options  Cellulitis of right lower extremity  Diagnosis management comments: Cellulitis versus DVT. D-dimer is trace elevated at 0.59. Ultrasound to be done today. If negative, patient will be started on Bactrim and discharged home ultrasound is negative for DVT and patient will be treated for cellulitis with Bactrim and discharged home.          Recent Results (from the past 12 hour(s))   D DIMER    Collection Time: 12/29/22 11:19 PM   Result Value Ref Range    D DIMER 0.59 (H) <0.50 ug/ml(FEU)       Procedures

## 2023-01-14 ENCOUNTER — APPOINTMENT (OUTPATIENT)
Dept: CT IMAGING | Age: 58
End: 2023-01-14
Attending: STUDENT IN AN ORGANIZED HEALTH CARE EDUCATION/TRAINING PROGRAM
Payer: COMMERCIAL

## 2023-01-14 ENCOUNTER — HOSPITAL ENCOUNTER (EMERGENCY)
Age: 58
Discharge: HOME | End: 2023-01-14
Attending: STUDENT IN AN ORGANIZED HEALTH CARE EDUCATION/TRAINING PROGRAM
Payer: COMMERCIAL

## 2023-01-14 VITALS
HEIGHT: 71 IN | OXYGEN SATURATION: 98 % | HEART RATE: 70 BPM | RESPIRATION RATE: 16 BRPM | DIASTOLIC BLOOD PRESSURE: 78 MMHG | WEIGHT: 270 LBS | SYSTOLIC BLOOD PRESSURE: 140 MMHG | BODY MASS INDEX: 37.8 KG/M2 | TEMPERATURE: 98.2 F

## 2023-01-14 DIAGNOSIS — R22.0 RIGHT FACIAL SWELLING: Primary | ICD-10-CM

## 2023-01-14 LAB
ANION GAP BLD CALC-SCNC: 10 (ref 10–20)
BASOPHILS # BLD: 0 K/UL (ref 0–0.1)
BASOPHILS NFR BLD: 0 % (ref 0–1)
CA-I BLD-MCNC: 1.23 MMOL/L (ref 1.12–1.32)
CHLORIDE BLD-SCNC: 106 MMOL/L (ref 100–108)
CO2 BLD-SCNC: 27 MMOL/L (ref 19–24)
CREAT UR-MCNC: 0.7 MG/DL (ref 0.6–1.3)
DIFFERENTIAL METHOD BLD: ABNORMAL
EOSINOPHIL # BLD: 0 K/UL (ref 0–0.4)
EOSINOPHIL NFR BLD: 0 % (ref 0–7)
ERYTHROCYTE [DISTWIDTH] IN BLOOD BY AUTOMATED COUNT: 18 % (ref 11.5–14.5)
GLUCOSE BLD STRIP.AUTO-MCNC: 105 MG/DL (ref 74–106)
HCT VFR BLD AUTO: 34.6 % (ref 36.6–50.3)
HGB BLD-MCNC: 10.5 G/DL (ref 12.1–17)
IMM GRANULOCYTES # BLD AUTO: 0 K/UL (ref 0–0.04)
IMM GRANULOCYTES NFR BLD AUTO: 0 % (ref 0–0.5)
LACTATE BLD-SCNC: 1.27 MMOL/L (ref 0.4–2)
LYMPHOCYTES # BLD: 1.7 K/UL (ref 0.8–3.5)
LYMPHOCYTES NFR BLD: 29 % (ref 12–49)
MCH RBC QN AUTO: 21.3 PG (ref 26–34)
MCHC RBC AUTO-ENTMCNC: 30.3 G/DL (ref 30–36.5)
MCV RBC AUTO: 70.2 FL (ref 80–99)
MONOCYTES # BLD: 0.8 K/UL (ref 0–1)
MONOCYTES NFR BLD: 14 % (ref 5–13)
NEUTS SEG # BLD: 3.2 K/UL (ref 1.8–8)
NEUTS SEG NFR BLD: 56 % (ref 32–75)
NRBC # BLD: 0 K/UL (ref 0–0.01)
NRBC BLD-RTO: 0 PER 100 WBC
PLATELET # BLD AUTO: 187 K/UL (ref 150–400)
POTASSIUM BLD-SCNC: 3.9 MMOL/L (ref 3.5–5.5)
RBC # BLD AUTO: 4.93 M/UL (ref 4.1–5.7)
SODIUM BLD-SCNC: 141 MMOL/L (ref 136–145)
WBC # BLD AUTO: 5.7 K/UL (ref 4.1–11.1)

## 2023-01-14 PROCEDURE — 70487 CT MAXILLOFACIAL W/DYE: CPT

## 2023-01-14 PROCEDURE — 85025 COMPLETE CBC W/AUTO DIFF WBC: CPT

## 2023-01-14 PROCEDURE — 74011000636 HC RX REV CODE- 636: Performed by: STUDENT IN AN ORGANIZED HEALTH CARE EDUCATION/TRAINING PROGRAM

## 2023-01-14 PROCEDURE — 99285 EMERGENCY DEPT VISIT HI MDM: CPT

## 2023-01-14 PROCEDURE — 36415 COLL VENOUS BLD VENIPUNCTURE: CPT

## 2023-01-14 RX ADMIN — IOPAMIDOL 100 ML: 612 INJECTION, SOLUTION INTRAVENOUS at 17:03

## 2023-01-14 NOTE — ED PROVIDER NOTES
EMERGENCY DEPARTMENT HISTORY AND PHYSICAL EXAM      Date: 1/14/2023  Patient Name: Xochilt Han    History of Presenting Illness     HPI: Xochilt Han, 65 y.o. male presents to the ED with cc of ***    PCP: Fanny Sauceda MD    No current facility-administered medications on file prior to encounter. Current Outpatient Medications on File Prior to Encounter   Medication Sig Dispense Refill    sildenafil citrate (VIAGRA) 100 mg tablet TAKE 1 TABLET BY MOUTH AS NEEDED FOR ERECTILE DYSFUNCTION 6 Tablet 1    methylphenidate HCl 40 mg BP30 Take 40 mg by mouth daily for 29 days. Max Daily Amount: 40 mg. 30 Capsule 0    [START ON 2/4/2023] methylphenidate HCl 40 mg BP30 Take 40 mg by mouth daily for 29 days. Max Daily Amount: 40 mg. 30 Capsule 0    sildenafil citrate (VIAGRA) 100 mg tablet Take 1 Tablet by mouth as needed for Erectile Dysfunction. 12 Tablet 5    methylphenidate HCl (Metadate CD) 40 mg BP30 Take 40 mg by mouth every morning. Max Daily Amount: 40 mg. 90 Capsule 0    azithromycin (ZITHROMAX) 250 mg tablet Take two tablets today then one tablet daily (Patient not taking: Reported on 12/6/2022) 6 Tablet 0    pantoprazole (PROTONIX) 40 mg tablet Take 1 Tablet by mouth two (2) times a day. 180 Tablet 3    carbinoxamine maleate (PALGIC) 4 mg tab Take 4 mg by mouth two (2) times a day. (Patient not taking: Reported on 12/6/2022) 180 Tablet 3    acetaminophen (TYLENOL) 325 mg tablet Take 2 Tablets by mouth every six (6) hours as needed for Pain or Fever. 60 Tablet 0    albuterol (PROVENTIL HFA, VENTOLIN HFA, PROAIR HFA) 90 mcg/actuation inhaler Take 2 Puffs by inhalation every four (4) hours as needed for Wheezing or Shortness of Breath. 18 g 2    amLODIPine (NORVASC) 5 mg tablet Take 1 Tablet by mouth daily.  (Patient not taking: No sig reported) 30 Tablet 0    butalbital-acetaminophen-caffeine (FIORICET, ESGIC) -40 mg per tablet Take 1 Tablet by mouth every four (4) hours as needed for Headache or Migraine. (Patient not taking: No sig reported) 30 Tablet 0    melatonin 3 mg tablet Take 1 Tablet by mouth nightly as needed for Insomnia. 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       Past Surgical History:  Past Surgical History:   Procedure Laterality Date    BIOPSY LIVER      CELL COUNT, CSF      x 2    COLONOSCOPY      Upper and Lower     FOOT/TOES SURGERY PROC UNLISTED      Remove Nerve from Left foot     HX GI      liver stent    HX ORTHOPAEDIC      MRI BRAIN W WO CONT      STENT INSERTION      Liver to Kidneys        Family History:  Family History   Family history unknown: Yes       Social History:  Social History     Tobacco Use    Smoking status: Never    Smokeless tobacco: Never   Vaping Use    Vaping Use: Never used   Substance Use Topics    Alcohol use: Yes    Drug use: No       Allergies: Allergies   Allergen Reactions    Heparin (Bovine) Other (comments)     Questionable per patient ,back lesions. Review of Systems   Review of Systems    Physical Exam   Physical Exam    Diagnostic Study Results     Labs -   No results found for this or any previous visit (from the past 24 hour(s)). Radiologic Studies -   No orders to display     CT Results  (Last 48 hours)      None          CXR Results  (Last 48 hours)      None            Medical Decision Making   ITerrie MD-- am the first provider for this patient, and I am the attending of record for this patient encounter. I reviewed the vital signs, available nursing notes, past medical history, past surgical history, family history and social history. Vital Signs-Reviewed the patient's vital signs.   Patient Vitals for the past 24 hrs:   Temp Pulse Resp BP SpO2   01/14/23 1429 -- -- -- -- 99 %   01/14/23 1353 98.2 °F (36.8 °C) 74 16 (!) 146/81 99 %       EKG interpretation: (Preliminary)  ***. EKG interpretation by Yoni Gallagher MD    Records Reviewed: {[email or web address]:[id number]}    Provider Notes (Medical Decision Making):   ***           ED Course:   Initial assessment performed. The patient's presenting problems have been discussed, and they are in agreement with the care plan formulated and outlined with them. I have encouraged them to ask questions as they arise throughout their visit. Yoni Gallagher MD      Disposition:  ***      DISCHARGE PLAN:  1. Current Discharge Medication List        2. Follow-up Information    None       3. Return to ED if worse     Diagnosis     Clinical Impression: No diagnosis found. Attestations:    Yoni Gallagher MD    Please note that this dictation was completed with [employer], the computer voice recognition software. Quite often unanticipated grammatical, syntax, homophones, and other interpretive errors are inadvertently transcribed by the computer software. Please disregard these errors. Please excuse any errors that have escaped final proofreading. Thank you. 01/14/23 1429 -- -- -- -- 99 %   01/14/23 1353 98.2 °F (36.8 °C) 74 16 (!) 146/81 99 %     Records Reviewed: Nursing Notes and Old Medical Records    Provider Notes (Medical Decision Making):   DDx: Parotitis, idiopathic parotid gland swelling, salivary stone, viral infection such as mumps, dental infection, parotid duct obstruction, facial abscess, etc.    Plan: Basic blood work including CBC and poc chem 8, CT maxillofacial with contrast    Labs largely unrevealing and at baseline. CT without acute process to explain patient's current symptoms. No evidence of soft tissue abscess. Left-sided sinus mucosal inflammatory changes, however, that is not where patient's current swelling is and therefore an incidental finding. Results reviewed with the patient, who felt comfortable with plan for discharge. Advised PCP follow-up. ED Course:   Initial assessment performed. The patient's presenting problems have been discussed, and they are in agreement with the care plan formulated and outlined with them. I have encouraged them to ask questions as they arise throughout their visit. Tierra Garcia MD      Disposition:  DC      DISCHARGE PLAN:  1. Discharge Medication List as of 1/14/2023  7:02 PM        2. Follow-up Information       Follow up With Specialties Details Why Contact Info    Mariajose Centeno MD Family Medicine Schedule an appointment as soon as possible for a visit   [facility]  [phone]            3. Return to ED if worse     Diagnosis     Clinical Impression:   1. Right facial swelling        Attestations:    Tierra Garcia MD    Please note that this dictation was completed with [employer], the computer voice recognition software. Quite often unanticipated grammatical, syntax, homophones, and other interpretive errors are inadvertently transcribed by the computer software. Please disregard these errors.   Please excuse any errors that have escaped final proofreading. Thank you.

## 2023-01-14 NOTE — ED TRIAGE NOTES
Pt with swelling to the right jaw, below the ear that began approx 2-3 hrs ago. Denies any pain or dental problems.

## 2023-01-14 NOTE — DISCHARGE INSTRUCTIONS
You CT did not demonstrate any significant pathology for your current presentation. Please follow up with your PCP for ongoing evaluation.

## 2023-07-14 RX ORDER — CARBINOXAMINE MALEATE 4 MG/1
TABLET ORAL
Qty: 180 TABLET | Refills: 3 | Status: SHIPPED | OUTPATIENT
Start: 2023-07-14

## 2023-07-14 RX ORDER — PANTOPRAZOLE SODIUM 40 MG/1
TABLET, DELAYED RELEASE ORAL
Qty: 180 TABLET | Refills: 3 | Status: SHIPPED | OUTPATIENT
Start: 2023-07-14

## 2023-07-25 ENCOUNTER — TELEPHONE (OUTPATIENT)
Age: 58
End: 2023-07-25

## 2023-07-25 RX ORDER — SILDENAFIL 100 MG/1
100 TABLET, FILM COATED ORAL PRN
Qty: 6 TABLET | Refills: 0 | Status: SHIPPED | OUTPATIENT
Start: 2023-07-25

## 2023-07-25 NOTE — TELEPHONE ENCOUNTER
We received a fax refill request for Jermaine Centeno. Please escribe Sildenafil to their pharmacy. The pharmacy is correct in the chart and they are requesting a / day supply. Patient states that he can't get his refill until 8/4/23. He wants to know will you call him in 2or3 that he can  from the pharmacy, until he can get his refill.   He is going out of town, can you please call patient with status @ [phone]

## 2023-09-28 ENCOUNTER — TELEPHONE (OUTPATIENT)
Age: 58
End: 2023-09-28

## 2023-09-28 RX ORDER — SILDENAFIL 100 MG/1
100 TABLET, FILM COATED ORAL PRN
Qty: 6 TABLET | Refills: 0 | Status: SHIPPED | OUTPATIENT
Start: 2023-09-28

## 2023-09-28 NOTE — TELEPHONE ENCOUNTER
Pt called in about his sildenafil (VIAGRA) 100 MG tablet. States it is like 10.00 for an individual rx. He is wondering if you had a coupon for it or is there something else you could call in for him that is cheaper? Thanks.

## 2023-11-06 ENCOUNTER — OFFICE VISIT (OUTPATIENT)
Age: 58
End: 2023-11-06
Payer: COMMERCIAL

## 2023-11-06 VITALS
BODY MASS INDEX: 38.78 KG/M2 | DIASTOLIC BLOOD PRESSURE: 78 MMHG | TEMPERATURE: 97.7 F | HEART RATE: 71 BPM | HEIGHT: 71 IN | RESPIRATION RATE: 20 BRPM | SYSTOLIC BLOOD PRESSURE: 138 MMHG | OXYGEN SATURATION: 98 % | WEIGHT: 277 LBS

## 2023-11-06 DIAGNOSIS — I10 ESSENTIAL HYPERTENSION: ICD-10-CM

## 2023-11-06 DIAGNOSIS — F90.2 ATTENTION DEFICIT HYPERACTIVITY DISORDER (ADHD), COMBINED TYPE: ICD-10-CM

## 2023-11-06 DIAGNOSIS — R35.1 NOCTURIA: Primary | ICD-10-CM

## 2023-11-06 PROCEDURE — 3078F DIAST BP <80 MM HG: CPT | Performed by: FAMILY MEDICINE

## 2023-11-06 PROCEDURE — 3075F SYST BP GE 130 - 139MM HG: CPT | Performed by: FAMILY MEDICINE

## 2023-11-06 PROCEDURE — 99214 OFFICE O/P EST MOD 30 MIN: CPT | Performed by: FAMILY MEDICINE

## 2023-11-06 RX ORDER — METHYLPHENIDATE HYDROCHLORIDE 40 MG/1
40 CAPSULE, EXTENDED RELEASE ORAL DAILY
Qty: 30 CAPSULE | Refills: 0 | Status: SHIPPED | OUTPATIENT
Start: 2023-12-06 | End: 2024-01-04

## 2023-11-06 RX ORDER — SILDENAFIL 100 MG/1
100 TABLET, FILM COATED ORAL PRN
Qty: 6 TABLET | Refills: 5 | Status: SHIPPED | OUTPATIENT
Start: 2023-11-06

## 2023-11-06 RX ORDER — METHYLPHENIDATE HYDROCHLORIDE 40 MG/1
40 CAPSULE, EXTENDED RELEASE ORAL DAILY
Qty: 30 CAPSULE | Refills: 0 | Status: SHIPPED | OUTPATIENT
Start: 2023-11-06 | End: 2023-12-05

## 2023-11-06 RX ORDER — TAMSULOSIN HYDROCHLORIDE 0.4 MG/1
0.4 CAPSULE ORAL DAILY
Qty: 30 CAPSULE | Refills: 5 | Status: SHIPPED | OUTPATIENT
Start: 2023-11-06

## 2023-11-06 RX ORDER — METHYLPHENIDATE HYDROCHLORIDE 40 MG/1
40 CAPSULE, EXTENDED RELEASE ORAL DAILY
Qty: 30 CAPSULE | Refills: 0 | Status: SHIPPED | OUTPATIENT
Start: 2024-01-05 | End: 2024-02-03

## 2023-11-06 NOTE — PROGRESS NOTES
Chief Complaint   Patient presents with    Urinary Urgency     Leakage    Skin Tag     Neck and \"everywhere\"    Medication Refill     1. Have you been to the ER, urgent care clinic since your last visit? Hospitalized since your last visit? No    2. Have you seen or consulted any other health care providers outside of the [address] [place] [address] since your last visit? Include any pap smears or colon screening. No        Chief Complaint   Patient presents with    Urinary Urgency     Leakage    Skin Tag     Neck and \"everywhere\"    Medication Refill     He is a 63 y.o. male who presents for evalution. Reviewed PmHx, RxHx, FmHx, SocHx, AllgHx and updated and dated in the chart. CURRENT MEDS W/ ASSOC DIAG           Start Date End Date     acetaminophen (TYLENOL) 325 MG tablet  01/20/22  --     Associated Diagnoses:  --     albuterol sulfate HFA (PROVENTIL;VENTOLIN;PROAIR) 108 (90 Base) MCG/ACT inhaler  01/20/22  --     Associated Diagnoses:  --     Carbinoxamine Maleate 4 MG TABS  07/14/23  --     TAKE 1 TABLET BY MOUTH TWICE A DAY     Associated Diagnoses:  --     methylphenidate (METADATE CD) 40 MG extended release capsule  08/01/22  --     Associated Diagnoses:  --     methylphenidate (METADATE CD) 40 MG extended release capsule  11/06/23 12/05/23     Take 1 capsule by mouth daily for 29 days. Max Daily Amount: 40 mg     Associated Diagnoses:  Attention deficit hyperactivity disorder (ADHD), combined type     methylphenidate (METADATE CD) 40 MG extended release capsule  12/06/23 01/04/24     Take 1 capsule by mouth daily for 29 days. Max Daily Amount: 40 mg     Associated Diagnoses:  Attention deficit hyperactivity disorder (ADHD), combined type     methylphenidate (METADATE CD) 40 MG extended release capsule  01/05/24 02/03/24     Take 1 capsule by mouth daily for 29 days.  Max Daily Amount: 40 mg     Associated Diagnoses:  Attention deficit hyperactivity disorder (ADHD), combined type     pantoprazole

## 2023-11-06 NOTE — PROGRESS NOTES
Chief Complaint   Patient presents with    Urinary Urgency     Leakage    Skin Tag     Neck and \"everywhere\"    Medication Refill     1. Have you been to the ER, urgent care clinic since your last visit? Hospitalized since your last visit? No    2. Have you seen or consulted any other health care providers outside of the [address] [place] [address] Avenue since your last visit? Include any pap smears or colon screening.  No

## 2023-11-17 ENCOUNTER — TELEPHONE (OUTPATIENT)
Age: 58
End: 2023-11-17

## 2023-11-17 ENCOUNTER — OFFICE VISIT (OUTPATIENT)
Age: 58
End: 2023-11-17

## 2023-11-17 ENCOUNTER — HOSPITAL ENCOUNTER (OUTPATIENT)
Facility: HOSPITAL | Age: 58
Discharge: HOME | End: 2023-11-17
Payer: COMMERCIAL

## 2023-11-17 VITALS
OXYGEN SATURATION: 97 % | WEIGHT: 277.8 LBS | TEMPERATURE: 98 F | RESPIRATION RATE: 20 BRPM | DIASTOLIC BLOOD PRESSURE: 78 MMHG | SYSTOLIC BLOOD PRESSURE: 121 MMHG | HEIGHT: 71 IN | BODY MASS INDEX: 38.89 KG/M2 | HEART RATE: 90 BPM

## 2023-11-17 DIAGNOSIS — R05.1 ACUTE COUGH: ICD-10-CM

## 2023-11-17 DIAGNOSIS — R06.02 SOB (SHORTNESS OF BREATH): Primary | ICD-10-CM

## 2023-11-17 DIAGNOSIS — R06.02 SOB (SHORTNESS OF BREATH): ICD-10-CM

## 2023-11-17 DIAGNOSIS — R93.89 ABNORMAL CHEST X-RAY: Primary | ICD-10-CM

## 2023-11-17 PROCEDURE — 71046 X-RAY EXAM CHEST 2 VIEWS: CPT

## 2023-11-17 RX ORDER — GUAIFENESIN AND CODEINE PHOSPHATE 100; 10 MG/5ML; MG/5ML
5 SOLUTION ORAL 2 TIMES DAILY PRN
Qty: 30 ML | Refills: 0 | Status: SHIPPED | OUTPATIENT
Start: 2023-11-17 | End: 2023-11-20

## 2023-11-17 RX ORDER — METHYLPREDNISOLONE 4 MG/1
TABLET ORAL
Qty: 21 TABLET | Refills: 0 | Status: SHIPPED | OUTPATIENT
Start: 2023-11-17 | End: 2023-11-23

## 2023-11-17 SDOH — ECONOMIC STABILITY: HOUSING INSECURITY
IN THE LAST 12 MONTHS, WAS THERE A TIME WHEN YOU DID NOT HAVE A STEADY PLACE TO SLEEP OR SLEPT IN A SHELTER (INCLUDING NOW)?: NO

## 2023-11-17 SDOH — ECONOMIC STABILITY: FOOD INSECURITY: WITHIN THE PAST 12 MONTHS, THE FOOD YOU BOUGHT JUST DIDN'T LAST AND YOU DIDN'T HAVE MONEY TO GET MORE.: NEVER TRUE

## 2023-11-17 SDOH — ECONOMIC STABILITY: INCOME INSECURITY: HOW HARD IS IT FOR YOU TO PAY FOR THE VERY BASICS LIKE FOOD, HOUSING, MEDICAL CARE, AND HEATING?: NOT HARD AT ALL

## 2023-11-17 SDOH — ECONOMIC STABILITY: FOOD INSECURITY: WITHIN THE PAST 12 MONTHS, YOU WORRIED THAT YOUR FOOD WOULD RUN OUT BEFORE YOU GOT MONEY TO BUY MORE.: NEVER TRUE

## 2023-11-17 ASSESSMENT — PATIENT HEALTH QUESTIONNAIRE - PHQ9
SUM OF ALL RESPONSES TO PHQ9 QUESTIONS 1 & 2: 0
SUM OF ALL RESPONSES TO PHQ QUESTIONS 1-9: 0
1. LITTLE INTEREST OR PLEASURE IN DOING THINGS: 0
2. FEELING DOWN, DEPRESSED OR HOPELESS: 0

## 2023-11-17 NOTE — TELEPHONE ENCOUNTER
Patient was contact with results of his chest x-ray. CT scan has been ordered to follow-up on abnormal chest x-ray.

## 2023-11-17 NOTE — PROGRESS NOTES
Janeth Bonilla is a 63 y.o. male , id x 2(name and ). Reviewed record, history, and  medications. Chief Complaint   Patient presents with    URI     Patient symptoms, sob, cough, dry mouth, x2-3 days. Vitals:    23 1422   Weight: 126 kg (277 lb 12.8 oz)   Height: 1.803 m (5' 11\")       Coordination of Care Questionnaire:   1. Have you been to the ER, urgent care clinic since your last visit? Hospitalized since your last visit? No    2. Have you seen or consulted any other health care providers outside of the [address] [place] [address] since your last visit? Include any pap smears or colon screening. No          2023     2:25 PM   PHQ-9    Little interest or pleasure in doing things 0   Feeling down, depressed, or hopeless 0   PHQ-2 Score 0   PHQ-9 Total Score 0            No data to display                Social Determinants of Health     Tobacco Use: Low Risk  (2023)    Patient History     Smoking Tobacco Use: Never     Smokeless Tobacco Use: Never     Passive Exposure: Not on file   Alcohol Use: Not on file   Financial Resource Strain: Low Risk  (2023)    Overall Financial Resource Strain (CARDIA)     Difficulty of Paying Living Expenses: Not hard at all   Food Insecurity: No Food Insecurity (2023)    Hunger Vital Sign     Worried About Running Out of Food in the Last Year: Never true     [address] in the Last Year: Never true   Transportation Needs: Unknown (2023)    PRAPARE - Transportation     Lack of Transportation (Medical): Not on file     Lack of Transportation (Non-Medical):  No   Physical Activity: Not on file   Stress: Not on file   Social Connections: Not on file   Intimate Partner Violence: Not on file   Depression: Not at risk (2023)    PHQ-2     PHQ-2 Score: 0   Housing Stability: Unknown (2023)    Housing Stability Vital Sign     Unable to Pay for Housing in the Last Year: Not on file     Number of Places Lived in the Last Year: Not on

## 2023-12-06 ENCOUNTER — HOSPITAL ENCOUNTER (OUTPATIENT)
Facility: HOSPITAL | Age: 58
Discharge: HOME | End: 2023-12-09
Payer: COMMERCIAL

## 2023-12-06 DIAGNOSIS — R93.89 ABNORMAL CHEST X-RAY: ICD-10-CM

## 2023-12-06 PROCEDURE — 71250 CT THORAX DX C-: CPT

## 2023-12-08 ENCOUNTER — TELEPHONE (OUTPATIENT)
Age: 58
End: 2023-12-08

## 2023-12-08 RX ORDER — FLUTICASONE PROPIONATE AND SALMETEROL 250; 50 UG/1; UG/1
1 POWDER RESPIRATORY (INHALATION) EVERY 12 HOURS
Qty: 60 EACH | Refills: 0 | Status: SHIPPED | OUTPATIENT
Start: 2023-12-08

## 2023-12-08 NOTE — TELEPHONE ENCOUNTER
Pt id x 3, notified as per Antonia and verbalized understanding.    Pt states that he will wait for the CT f/up

## 2023-12-08 NOTE — TELEPHONE ENCOUNTER
Please let the patient know that I will be sending an inhaler to the pharmacy that I would like for him to try to see if this helps with the SOB/cough. Also remind the patient that he will need to get a CT of his chest in 3 months to follow-up the abnormality seen during imaging unless he would like for me to put in for a PET scan now.

## 2023-12-08 NOTE — TELEPHONE ENCOUNTER
----- Message from Rabia Edge LPN sent at 59/7/7960 11:51 AM EST -----  PT id x 3, notified as per Rochel Spatz and verbalized understanding. States that he has been working a lot outside and that it could possible be inflammation. States that he feels a little better but is still short winded and would like to know if he could have another steroid pack or another abx? Please advise.

## 2023-12-29 DIAGNOSIS — N52.9 MALE ERECTILE DYSFUNCTION, UNSPECIFIED: ICD-10-CM

## 2024-01-02 RX ORDER — SILDENAFIL 100 MG/1
100 TABLET, FILM COATED ORAL PRN
Qty: 6 TABLET | Refills: 11 | Status: SHIPPED | OUTPATIENT
Start: 2024-01-02

## 2024-01-02 RX ORDER — FLUTICASONE PROPIONATE AND SALMETEROL 50; 250 UG/1; UG/1
POWDER RESPIRATORY (INHALATION)
Qty: 60 EACH | Refills: 0 | Status: SHIPPED | OUTPATIENT
Start: 2024-01-02

## 2024-02-01 ENCOUNTER — TELEPHONE (OUTPATIENT)
Age: 59
End: 2024-02-01

## 2024-02-01 NOTE — TELEPHONE ENCOUNTER
Pt called in and is asking if you think he should start taking ozempic? He wanted to ask you before making an apt with you, so he didn't waste your time.

## 2024-02-05 RX ORDER — FLUTICASONE PROPIONATE AND SALMETEROL 250; 50 UG/1; UG/1
POWDER RESPIRATORY (INHALATION)
Qty: 60 EACH | Refills: 0 | Status: SHIPPED | OUTPATIENT
Start: 2024-02-05

## 2024-02-05 NOTE — TELEPHONE ENCOUNTER
Called and spoke to patient.(Vicente) Patient states understanding per Dr Yang: that he is not qualified to take it secondary because he does not have diabetes.

## 2024-04-25 DIAGNOSIS — R35.1 NOCTURIA: ICD-10-CM

## 2024-04-25 RX ORDER — TAMSULOSIN HYDROCHLORIDE 0.4 MG/1
CAPSULE ORAL DAILY
Qty: 90 CAPSULE | Refills: 1 | Status: SHIPPED | OUTPATIENT
Start: 2024-04-25

## 2024-06-01 ENCOUNTER — HOSPITAL ENCOUNTER (EMERGENCY)
Facility: HOSPITAL | Age: 59
Discharge: HOME | End: 2024-06-01
Attending: EMERGENCY MEDICINE
Payer: COMMERCIAL

## 2024-06-01 ENCOUNTER — APPOINTMENT (OUTPATIENT)
Facility: HOSPITAL | Age: 59
End: 2024-06-01
Payer: COMMERCIAL

## 2024-06-01 VITALS
OXYGEN SATURATION: 98 % | WEIGHT: 280 LBS | HEIGHT: 71 IN | SYSTOLIC BLOOD PRESSURE: 143 MMHG | DIASTOLIC BLOOD PRESSURE: 74 MMHG | HEART RATE: 87 BPM | BODY MASS INDEX: 39.2 KG/M2 | RESPIRATION RATE: 18 BRPM | TEMPERATURE: 98.2 F

## 2024-06-01 DIAGNOSIS — K92.0 HEMATEMESIS, UNSPECIFIED WHETHER NAUSEA PRESENT: Primary | ICD-10-CM

## 2024-06-01 LAB
ALBUMIN SERPL-MCNC: 3.3 G/DL (ref 3.5–5.2)
ALBUMIN/GLOB SERPL: 1.2 (ref 1.1–2.2)
ALP SERPL-CCNC: 80 U/L (ref 40–129)
ALT SERPL-CCNC: 23 U/L (ref 10–50)
ANION GAP SERPL CALC-SCNC: 7 MMOL/L (ref 5–15)
AST SERPL-CCNC: 25 U/L (ref 10–50)
BASOPHILS # BLD: 0 K/UL (ref 0–1)
BASOPHILS NFR BLD: 1 % (ref 0–1)
BILIRUB SERPL-MCNC: 0.4 MG/DL (ref 0.2–1)
BUN SERPL-MCNC: 35 MG/DL (ref 6–20)
BUN/CREAT SERPL: 56 (ref 12–20)
CALCIUM SERPL-MCNC: 8.7 MG/DL (ref 8.6–10)
CHLORIDE SERPL-SCNC: 105 MMOL/L (ref 98–107)
CO2 SERPL-SCNC: 27 MMOL/L (ref 22–29)
CREAT SERPL-MCNC: 0.62 MG/DL (ref 0.7–1.2)
DIFFERENTIAL METHOD BLD: ABNORMAL
EOSINOPHIL # BLD: 0.4 K/UL (ref 0–0.4)
EOSINOPHIL NFR BLD: 5 % (ref 0–7)
ERYTHROCYTE [DISTWIDTH] IN BLOOD BY AUTOMATED COUNT: 17.6 % (ref 11.5–14.5)
GLOBULIN SER CALC-MCNC: 2.8 G/DL (ref 2–4)
GLUCOSE SERPL-MCNC: 131 MG/DL (ref 65–100)
HCT VFR BLD AUTO: 34.8 % (ref 36.6–50.3)
HGB BLD-MCNC: 11.1 G/DL (ref 12.1–17)
IMM GRANULOCYTES # BLD AUTO: 0 K/UL (ref 0–0.04)
IMM GRANULOCYTES NFR BLD AUTO: 0 % (ref 0–0.5)
LIPASE SERPL-CCNC: 28 U/L (ref 13–60)
LYMPHOCYTES # BLD: 1.9 K/UL (ref 0.8–3.5)
LYMPHOCYTES NFR BLD: 25 % (ref 12–49)
MCH RBC QN AUTO: 24.7 PG (ref 26–34)
MCHC RBC AUTO-ENTMCNC: 31.9 G/DL (ref 30–36.5)
MCV RBC AUTO: 77.3 FL (ref 80–99)
MONOCYTES # BLD: 0.7 K/UL (ref 0–1)
MONOCYTES NFR BLD: 9 % (ref 5–13)
NEUTS SEG # BLD: 4.7 K/UL (ref 1.8–8)
NEUTS SEG NFR BLD: 60 % (ref 32–75)
NRBC # BLD: 0 K/UL (ref 0–0.01)
NRBC BLD-RTO: 0 PER 100 WBC
PLATELET # BLD AUTO: 176 K/UL (ref 150–400)
PMV BLD AUTO: 10.8 FL (ref 8.9–12.9)
POTASSIUM SERPL-SCNC: 4.9 MMOL/L (ref 3.5–5.1)
PROT SERPL-MCNC: 6.1 G/DL (ref 6.4–8.3)
RBC # BLD AUTO: 4.5 M/UL (ref 4.1–5.7)
SODIUM SERPL-SCNC: 139 MMOL/L (ref 136–145)
WBC # BLD AUTO: 7.9 K/UL (ref 4.1–11.1)

## 2024-06-01 PROCEDURE — 83690 ASSAY OF LIPASE: CPT

## 2024-06-01 PROCEDURE — 6360000004 HC RX CONTRAST MEDICATION: Performed by: EMERGENCY MEDICINE

## 2024-06-01 PROCEDURE — 80053 COMPREHEN METABOLIC PANEL: CPT

## 2024-06-01 PROCEDURE — 96374 THER/PROPH/DIAG INJ IV PUSH: CPT

## 2024-06-01 PROCEDURE — 2580000003 HC RX 258: Performed by: EMERGENCY MEDICINE

## 2024-06-01 PROCEDURE — 36415 COLL VENOUS BLD VENIPUNCTURE: CPT

## 2024-06-01 PROCEDURE — 74178 CT ABD&PLV WO CNTR FLWD CNTR: CPT

## 2024-06-01 PROCEDURE — 99285 EMERGENCY DEPT VISIT HI MDM: CPT

## 2024-06-01 PROCEDURE — 6360000002 HC RX W HCPCS: Performed by: EMERGENCY MEDICINE

## 2024-06-01 PROCEDURE — 85025 COMPLETE CBC W/AUTO DIFF WBC: CPT

## 2024-06-01 RX ORDER — ONDANSETRON 4 MG/1
4 TABLET, ORALLY DISINTEGRATING ORAL 3 TIMES DAILY PRN
Qty: 21 TABLET | Refills: 0 | Status: SHIPPED | OUTPATIENT
Start: 2024-06-01

## 2024-06-01 RX ORDER — ONDANSETRON 2 MG/ML
4 INJECTION INTRAMUSCULAR; INTRAVENOUS ONCE
Status: COMPLETED | OUTPATIENT
Start: 2024-06-01 | End: 2024-06-01

## 2024-06-01 RX ORDER — 0.9 % SODIUM CHLORIDE 0.9 %
1000 INTRAVENOUS SOLUTION INTRAVENOUS ONCE
Status: COMPLETED | OUTPATIENT
Start: 2024-06-01 | End: 2024-06-01

## 2024-06-01 RX ADMIN — ONDANSETRON 4 MG: 2 INJECTION INTRAMUSCULAR; INTRAVENOUS at 07:56

## 2024-06-01 RX ADMIN — SODIUM CHLORIDE 1000 ML: 9 INJECTION, SOLUTION INTRAVENOUS at 07:56

## 2024-06-01 RX ADMIN — IOPAMIDOL 100 ML: 755 INJECTION, SOLUTION INTRAVENOUS at 08:42

## 2024-06-01 ASSESSMENT — ENCOUNTER SYMPTOMS
COUGH: 0
NAUSEA: 1
DIARRHEA: 0
EYE PAIN: 0
ABDOMINAL PAIN: 0
SHORTNESS OF BREATH: 0
SORE THROAT: 0
BACK PAIN: 0
COLOR CHANGE: 0
VOMITING: 0
RHINORRHEA: 0

## 2024-06-01 ASSESSMENT — LIFESTYLE VARIABLES
HOW OFTEN DO YOU HAVE A DRINK CONTAINING ALCOHOL: NEVER
HOW MANY STANDARD DRINKS CONTAINING ALCOHOL DO YOU HAVE ON A TYPICAL DAY: PATIENT DOES NOT DRINK

## 2024-06-01 NOTE — ED NOTES
Patient does not appear to be in any acute distress/shows no evidence of clinical instability at this time.     Provider has reviewed discharge instructions with the patient.  The patient verbalized understanding instructions as well as need for follow up with PCP for further plan of care.     Discharge papers given, education provided, and any questions answered. Patient discharged by provider.

## 2024-06-01 NOTE — ED TRIAGE NOTES
Pt arrives to ER POV c/o vomiting blood and black stools that started yesterday. Hx of internal bleeding had to have a blood transfusion and iron infusions. Pt sees Dr. Breen from [facility].

## 2024-06-01 NOTE — ED PROVIDER NOTES
[facility] EMERGENCY DEPT  EMERGENCY DEPARTMENT ENCOUNTER      Pt Name: Vicente Blue  MRN: [id number]  Birthdate 1965  Date of evaluation: 6/1/2024  Provider: Harry Graves MD    CHIEF COMPLAINT       Chief Complaint   Patient presents with    Hematemesis         HISTORY OF PRESENT ILLNESS   (Location/Symptom, Timing/Onset, Context/Setting, Quality, Duration, Modifying Factors, Severity)  Note limiting factors.   59-year-old male with traces of hematemesis this morning.  He has been passing black stools for the past day and a half.  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  Today he states he does not have dizziness or lightheadedness.  No tachycardia but he is worried about this trace amount of blood that he is passed today.    The history is provided by the patient.   Nausea & Vomiting  Severity:  Mild  Duration:  4 hours  Timing:  Rare  Quality:  Bright red blood  Progression:  Resolved  Chronicity:  Recurrent  Relieved by:  Nothing  Worsened by:  Nothing  Ineffective treatments:  None tried  Associated symptoms: no abdominal pain, no arthralgias, no chills, no cough, no diarrhea, no fever, no headaches, no myalgias and no sore throat    Risk factors comment:  Rare hematologic cancer        Review of External Medical Records:     Nursing Notes were reviewed.    REVIEW OF SYSTEMS    (2-9 systems for level 4, 10 or more for level 5)     Review of Systems   Constitutional:  Negative for chills, fatigue and fever.   HENT:  Negative for ear pain, rhinorrhea and sore throat.    Eyes:  Negative for pain and visual disturbance.   Respiratory:  Negative for cough and shortness of breath.    Cardiovascular:  Negative for chest pain.   Gastrointestinal:  Positive for nausea. Negative for abdominal pain, diarrhea and vomiting.   Genitourinary:  Negative for dysuria.   Musculoskeletal:  Negative for

## 2024-06-03 NOTE — LETTER
NOTIFICATION RETURN TO WORK 
 
4/2/2020 8:58 AM 
 
Mr. Jn Etienne [email or web address] To Whom It May Concern: 
 
Jn Etienne is currently under the care of [address]. Due to his history of POEMS disease and compromised immune system, Mr. Liss Roa should limit all contact with the general public and co-workers while working. He considered to be high risk population and has increased vulnerability to Covid 19 virus during this pandemic. If there are questions or concerns please have the patient contact our office.  
 
 
 
Sincerely, 
 
 
Mike Riddle NP 
 
                                
 
 ,

## 2024-06-04 ENCOUNTER — ANESTHESIA (OUTPATIENT)
Facility: HOSPITAL | Age: 59
End: 2024-06-04
Payer: COMMERCIAL

## 2024-06-04 ENCOUNTER — HOSPITAL ENCOUNTER (OUTPATIENT)
Facility: HOSPITAL | Age: 59
Setting detail: OUTPATIENT SURGERY
Discharge: HOME | End: 2024-06-04
Attending: INTERNAL MEDICINE | Admitting: INTERNAL MEDICINE
Payer: COMMERCIAL

## 2024-06-04 ENCOUNTER — ANESTHESIA EVENT (OUTPATIENT)
Facility: HOSPITAL | Age: 59
End: 2024-06-04
Payer: COMMERCIAL

## 2024-06-04 VITALS
BODY MASS INDEX: 40.43 KG/M2 | OXYGEN SATURATION: 100 % | DIASTOLIC BLOOD PRESSURE: 69 MMHG | RESPIRATION RATE: 16 BRPM | HEIGHT: 71 IN | HEART RATE: 79 BPM | SYSTOLIC BLOOD PRESSURE: 132 MMHG | TEMPERATURE: 97.5 F | WEIGHT: 288.8 LBS

## 2024-06-04 PROCEDURE — 88312 SPECIAL STAINS GROUP 1: CPT

## 2024-06-04 PROCEDURE — 2500000003 HC RX 250 WO HCPCS: Performed by: NURSE ANESTHETIST, CERTIFIED REGISTERED

## 2024-06-04 PROCEDURE — 88341 IMHCHEM/IMCYTCHM EA ADD ANTB: CPT

## 2024-06-04 PROCEDURE — 3600007512: Performed by: INTERNAL MEDICINE

## 2024-06-04 PROCEDURE — 88342 IMHCHEM/IMCYTCHM 1ST ANTB: CPT

## 2024-06-04 PROCEDURE — 88305 TISSUE EXAM BY PATHOLOGIST: CPT

## 2024-06-04 PROCEDURE — 3700000000 HC ANESTHESIA ATTENDED CARE: Performed by: INTERNAL MEDICINE

## 2024-06-04 PROCEDURE — 7100000010 HC PHASE II RECOVERY - FIRST 15 MIN: Performed by: INTERNAL MEDICINE

## 2024-06-04 PROCEDURE — 2580000003 HC RX 258: Performed by: NURSE ANESTHETIST, CERTIFIED REGISTERED

## 2024-06-04 PROCEDURE — 2709999900 HC NON-CHARGEABLE SUPPLY: Performed by: INTERNAL MEDICINE

## 2024-06-04 PROCEDURE — 3700000001 HC ADD 15 MINUTES (ANESTHESIA): Performed by: INTERNAL MEDICINE

## 2024-06-04 PROCEDURE — 6360000002 HC RX W HCPCS: Performed by: NURSE ANESTHETIST, CERTIFIED REGISTERED

## 2024-06-04 PROCEDURE — 7100000011 HC PHASE II RECOVERY - ADDTL 15 MIN: Performed by: INTERNAL MEDICINE

## 2024-06-04 PROCEDURE — 3600007502: Performed by: INTERNAL MEDICINE

## 2024-06-04 RX ORDER — SODIUM CHLORIDE 9 MG/ML
INJECTION, SOLUTION INTRAVENOUS CONTINUOUS PRN
Status: DISCONTINUED | OUTPATIENT
Start: 2024-06-04 | End: 2024-06-04 | Stop reason: SDUPTHER

## 2024-06-04 RX ORDER — SODIUM CHLORIDE 9 MG/ML
INJECTION, SOLUTION INTRAVENOUS CONTINUOUS
Status: CANCELLED | OUTPATIENT
Start: 2024-06-04

## 2024-06-04 RX ORDER — PROPOFOL 10 MG/ML
INJECTION, EMULSION INTRAVENOUS PRN
Status: DISCONTINUED | OUTPATIENT
Start: 2024-06-04 | End: 2024-06-04 | Stop reason: SDUPTHER

## 2024-06-04 RX ADMIN — SODIUM CHLORIDE: 9 INJECTION, SOLUTION INTRAVENOUS at 14:07

## 2024-06-04 RX ADMIN — PROPOFOL 50 MG: 10 INJECTION, EMULSION INTRAVENOUS at 14:11

## 2024-06-04 RX ADMIN — PROPOFOL 40 MG: 10 INJECTION, EMULSION INTRAVENOUS at 14:19

## 2024-06-04 RX ADMIN — LIDOCAINE HYDROCHLORIDE 40 MG: 20 INJECTION, SOLUTION INFILTRATION; PERINEURAL at 14:07

## 2024-06-04 RX ADMIN — PROPOFOL 50 MG: 10 INJECTION, EMULSION INTRAVENOUS at 14:15

## 2024-06-04 RX ADMIN — PROPOFOL 100 MG: 10 INJECTION, EMULSION INTRAVENOUS at 14:07

## 2024-06-04 ASSESSMENT — PAIN SCALES - GENERAL
PAINLEVEL_OUTOF10: 0

## 2024-06-04 ASSESSMENT — PAIN - FUNCTIONAL ASSESSMENT: PAIN_FUNCTIONAL_ASSESSMENT: 0-10

## 2024-06-04 NOTE — PROGRESS NOTES
Vicente Blue  1965  [id number]    Situation:  Verbal report received from:  JAY Smith   Procedure: Procedure(s):  ESOPHAGOGASTRODUODENOSCOPY  ESOPHAGOGASTRODUODENOSCOPY BIOPSY    Background:    Preoperative diagnosis: Dysphagia, unspecified type [R13.10]  Postoperative diagnosis: * No post-op diagnosis entered *    :  Dr. Smith   Assistant(s): Circulator: Darcy Donato RN  Circulator Assist: Mary Condon RN    Specimens:   ID Type Source Tests Collected by Time Destination   1 : distal esophagus biopsy Tissue Esophagus SURGICAL PATHOLOGY Neeraj Smith MD 6/4/2024 1412    2 : gastric biopsy Tissue Gastric SURGICAL PATHOLOGY Neeraj Smith MD 6/4/2024 1413    3 : duodenum biopsy Tissue Duodenum SURGICAL PATHOLOGY Neeraj Smith MD 6/4/2024 1414      H. Pylori    no    Assessment:  Intra-procedure medications     Anesthesia gave intra-procedure sedation and medications, see anesthesia flow sheet yes     Intravenous fluids: NS@ KVO     Vital signs stable   yes    Abdominal assessment: round and soft   yes    Recommendation:  Discharge patient per MD order  yes.  Return to floor  outpatient  Family or Friend   spouse   Permission to share finding with family or friend   yes

## 2024-06-04 NOTE — PROGRESS NOTES
Endoscopy discharge instructions have been reviewed and given to patient.  The patient verbalized understanding and acceptance of instructions.      Dr. Smith  discussed with patient procedure findings and next steps.

## 2024-06-04 NOTE — DISCHARGE INSTRUCTIONS
[facility]  Neeraj Smith M.D.  [phone]           2024  Vicente Blue  :  1965  [facility] Medical Record Number:  [id number]        ENDOSCOPY FINDINGS:   Your endoscopy showed severe esophagitis and hiatal hernia, most likely cause of the bleeding, otherwise mucosa within normal.      EGD DISCHARGE INSTRUCTIONS    DISCOMFORT:  Sore throat- throat lozenges or warm salt water gargle  redness at IV site- apply warm compress to area; if redness or soreness persist- contact your physician  Gaseous discomfort- walking, belching will help relieve any discomfort  You may not operate a vehicle for 12 hours  You may not engage in an occupation involving machinery or appliances for rest of today  You may not drink alcoholic beverages for at least 12 hours  Avoid making any critical decisions for at least 24 hour    DIET:   You may resume your regular diet.    ACTIVITY  Spend the remainder of the day resting -  avoid any strenuous activity. Avoid driving or operating machinery.    CALL M.D.  ANY SIGN OF   Increasing pain, nausea, vomiting  Abdominal distension (swelling)  New increased bleeding (oral or rectal)  Fever (chills)  Pain in chest area  Bloody discharge from nose or mouth  Shortness of breath    Follow-up Instructions:   Call Dr. Smith for any questions or problems. Telephone # [phone]  Biopsies were obtained, the results will be available  in  5 to 7 days. We will call you to notify you of these results.   Take pantoprazole 40 mg twice a day, a prescription was sent to your pharmacy. Follow strict anti-reflux measures. Check hemoglobin on a regular basis with Dr. Breen. Will plan for repeat EGD and add colonoscopy in 2 months.

## 2024-06-04 NOTE — ANESTHESIA POSTPROCEDURE EVALUATION
Department of Anesthesiology  Postprocedure Note    Patient: Vicente Blue  MRN: [id number]  YOB: 1965  Date of evaluation: 6/4/2024    Procedure Summary       Date: 06/04/24 Room / Location: [facility] 03 / [facility] ENDOSCOPY    Anesthesia Start: 1350 Anesthesia Stop: 1423    Procedures:       ESOPHAGOGASTRODUODENOSCOPY (Upper GI Region)      ESOPHAGOGASTRODUODENOSCOPY BIOPSY (Upper GI Region) Diagnosis:       Dysphagia, unspecified type      (Dysphagia, unspecified type [R13.10])    Surgeons: Neeraj Smith MD Responsible Provider: Jaden Holt MD    Anesthesia Type: MAC, TIVA ASA Status: 3            Anesthesia Type: MAC, TIVA    Allie Phase I: Allie Score: 10    Allie Phase II: Allie Score: 10    Anesthesia Post Evaluation    Patient location during evaluation: PACU  Patient participation: complete - patient participated  Level of consciousness: awake  Pain score: 0  Airway patency: patent  Nausea & Vomiting: no nausea and no vomiting  Cardiovascular status: blood pressure returned to baseline  Respiratory status: acceptable  Hydration status: euvolemic  Pain management: adequate    No notable events documented.

## 2024-06-04 NOTE — H&P
[facility]  Neeraj Smith M.D.  [phone]    History and Physical       NAME:  Vicente Blue   :   1965   MRN:   [id number]       Referring Physician:  Dr. Nicholas Fong    Consult Date: 2024 2:08 PM    Chief Complaint:  Acute blood loss anemia    History of Present Illness:  Patient is a 59 y.o. who is seen for acute blood loss anemia and black stools. Reports he has vomited some blood as well and stomach feels a little queezy.      PMH:  Past Medical History:   Diagnosis Date    Chronic pain     bilateral feet,neuropathy    Neuropathy associated with endocrine disorder (HCC) 3/11/2010    Other ill-defined conditions(799.89)     POEMS Syndrome, seen at [facility]    POEMS syndrome 3/11/2010       PSH:  Past Surgical History:   Procedure Laterality Date    BIOPSY LIVER      CELL COUNT, CSF      x 2    COLONOSCOPY      Upper and Lower     FOOT/TOES SURGERY PROC UNLISTED      Remove Nerve from Left foot     GI      liver stent    MRI BRAIN W WO CONTRAST      ORTHOPEDIC SURGERY      STENT INSERTION      Liver to Kidneys        Allergies:  Allergies   Allergen Reactions    Heparin (Bovine) Other (See Comments)     Questionable per patient ,back lesions.       Home Medications:  Prior to Admission Medications   Prescriptions Last Dose Informant Patient Reported? Taking?   Carbinoxamine Maleate 4 MG TABS Past Month  No No   Sig: TAKE 1 TABLET BY MOUTH TWICE A DAY   WIXELA INHUB 250-50 MCG/ACT AEPB diskus inhaler Past Month  No No   Sig: INHALE 1 PUFF INTO THE LUNGS IN THE MORNING AND IN THE EVENING   acetaminophen (TYLENOL) 325 MG tablet Past Week  Yes No   Sig: Take by mouth every 6 hours as needed   albuterol sulfate HFA (PROVENTIL;VENTOLIN;PROAIR) 108 (90 Base) MCG/ACT inhaler Past Month  Yes No   Sig: Inhale 2 puffs into the lungs every 4 hours as needed   methylphenidate (METADATE CD) 40 MG extended release capsule Past Month  Yes No   Sig: Take by mouth.   Patient not taking:

## 2024-06-04 NOTE — ANESTHESIA PRE PROCEDURE
(METADATE CD) 40 MG extended release capsule Take by mouth. 8/1/22   Automatic Reconciliation, Ar       Current medications:    No current facility-administered medications for this encounter.       Allergies:    Allergies   Allergen Reactions    Heparin (Bovine) Other (See Comments)     Questionable per patient ,back lesions.       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.9, R23.9    Neuropathy associated with endocrine disorder ([facility]) E34.9, G63    ADD (attention deficit disorder) F98.8    GI hemorrhage K92.2    Severe obesity ([facility]) E66.01    Insomnia G47.00    Essential hypertension I10    Dysphagia R13.10       Past Medical History:        Diagnosis Date    Chronic pain     bilateral feet,neuropathy    Neuropathy associated with endocrine disorder ([facility]) 3/11/2010    Other ill-defined conditions(799.89) 2001    POEMS Syndrome, seen at [facility]    POEMS syndrome 3/11/2010       Past Surgical History:        Procedure Laterality Date    BIOPSY LIVER      CELL COUNT, CSF      x 2    COLONOSCOPY      Upper and Lower     FOOT/TOES SURGERY PROC UNLISTED      Remove Nerve from Left foot     GI      liver stent    MRI BRAIN W WO CONTRAST      ORTHOPEDIC SURGERY      STENT INSERTION      Liver to Kidneys        Social History:    Social History     Tobacco Use    Smoking status: Never    Smokeless tobacco: Never   Substance Use Topics    Alcohol use: Yes                                Counseling given: Not Answered      Vital Signs (Current): There were no vitals filed for this visit.                                           BP Readings from Last 3 Encounters:   06/01/24 (!) 143/74   11/17/23 121/78   11/06/23 138/78       NPO Status:                                                                                 BMI:

## 2024-06-04 NOTE — PERIOP NOTE
Received recovery report from anesthesia team, see anesthesia note. Abdomen remains soft and non-tender post-procedure. Pt has no complaints at this time and tolerated procedure well. Endoscope was pre-cleaned at the bedside by Mary Condon RN immediately following procedure. Post recovery report given to Juliann Barksdale RN.

## 2024-06-04 NOTE — OP NOTE
esophagitis with mild narrowing at the GE-junction. Bleeding most likely related to the esophagitis.                         Small hiatal hernia, otherwise mucosa within normal.     Recommendations:    -Acid suppression with a proton pump inhibitor twice a day for 2 months  -Strict anti-reflux measures  -Continue to monitor hemoglobin  -Repeat EGD and add colonoscopy in 2 months, if negative and anemia is persistent, will need to follow with capsule endoscopy.  -Will follow-up on pathology results.    Neeraj Smith MD

## 2024-06-05 ENCOUNTER — TELEMEDICINE (OUTPATIENT)
Age: 59
End: 2024-06-05
Payer: COMMERCIAL

## 2024-06-05 DIAGNOSIS — K27.9 PUD (PEPTIC ULCER DISEASE): ICD-10-CM

## 2024-06-05 DIAGNOSIS — D47.2 POEMS SYNDROME: ICD-10-CM

## 2024-06-05 DIAGNOSIS — E34.9 POEMS SYNDROME: ICD-10-CM

## 2024-06-05 DIAGNOSIS — G62.9 NEUROPATHY: ICD-10-CM

## 2024-06-05 DIAGNOSIS — R23.9 POEMS SYNDROME: ICD-10-CM

## 2024-06-05 DIAGNOSIS — G62.9 POEMS SYNDROME: ICD-10-CM

## 2024-06-05 DIAGNOSIS — F90.2 ATTENTION DEFICIT HYPERACTIVITY DISORDER (ADHD), COMBINED TYPE: ICD-10-CM

## 2024-06-05 DIAGNOSIS — D62 ACUTE BLOOD LOSS ANEMIA: ICD-10-CM

## 2024-06-05 DIAGNOSIS — D50.9 IRON DEFICIENCY ANEMIA, UNSPECIFIED IRON DEFICIENCY ANEMIA TYPE: ICD-10-CM

## 2024-06-05 DIAGNOSIS — I10 ESSENTIAL HYPERTENSION: ICD-10-CM

## 2024-06-05 DIAGNOSIS — M79.10 MYALGIA: Primary | ICD-10-CM

## 2024-06-05 PROCEDURE — 99214 OFFICE O/P EST MOD 30 MIN: CPT | Performed by: FAMILY MEDICINE

## 2024-06-05 RX ORDER — PREGABALIN 50 MG/1
50 CAPSULE ORAL 2 TIMES DAILY
Qty: 60 CAPSULE | Refills: 5 | Status: SHIPPED | OUTPATIENT
Start: 2024-06-05 | End: 2025-06-05

## 2024-06-05 RX ORDER — METHYLPHENIDATE HYDROCHLORIDE 40 MG/1
40 CAPSULE, EXTENDED RELEASE ORAL DAILY
Qty: 30 CAPSULE | Refills: 0 | Status: SHIPPED | OUTPATIENT
Start: 2024-08-04 | End: 2024-09-02

## 2024-06-05 RX ORDER — METHYLPHENIDATE HYDROCHLORIDE 40 MG/1
40 CAPSULE, EXTENDED RELEASE ORAL DAILY
Qty: 30 CAPSULE | Refills: 0 | Status: SHIPPED | OUTPATIENT
Start: 2024-07-05 | End: 2024-08-03

## 2024-06-05 RX ORDER — METHYLPHENIDATE HYDROCHLORIDE 40 MG/1
40 CAPSULE, EXTENDED RELEASE ORAL DAILY
Qty: 30 CAPSULE | Refills: 0 | Status: SHIPPED | OUTPATIENT
Start: 2024-06-05 | End: 2024-07-04

## 2024-06-05 ASSESSMENT — PATIENT HEALTH QUESTIONNAIRE - PHQ9
SUM OF ALL RESPONSES TO PHQ QUESTIONS 1-9: 0
2. FEELING DOWN, DEPRESSED OR HOPELESS: NOT AT ALL
SUM OF ALL RESPONSES TO PHQ QUESTIONS 1-9: 0
1. LITTLE INTEREST OR PLEASURE IN DOING THINGS: NOT AT ALL
SUM OF ALL RESPONSES TO PHQ9 QUESTIONS 1 & 2: 0

## 2024-06-05 NOTE — PROGRESS NOTES
provided through a video synchronous discussion virtually to substitute for in-person clinic visit.   Patient and provider were located at their individual homes.    Johnny Montenegro, was evaluated through a synchronous (real-time) audio-video encounter. The patient (or guardian if applicable) is aware that this is a billable service, which includes applicable co-pays. This Virtual Visit was conducted with patient's (and/or legal guardian's) consent. Patient identification was verified, and a caregiver was present when appropriate.   The patient was located at Home: [address] [facility] [address]  Provider was located at Home (Appt [place] State[place]  Confirm you are appropriately licensed, registered, or certified to deliver care in the state where the patient is located as indicated above. If you are not or unsure, please re-schedule the visit: Yes, I confirm.       Time was used for level of billing: no   --Viktor Yang MD on 4/17/2024 at 8:51 AM  An electronic signature was used to authenticate this note.    I have discussed the diagnosis with the patient and the intended plan as seen in the above orders.  The patient understands and agrees with the plan. The patient has received an after-visit summary and questions were answered concerning future plans.     Medication Side Effects and Warnings were discussed with patient  Patient Labs were reviewed and or requested:  Patient Past Records were reviewed and or requested    Viktor Yang M.D.    There are no Patient Instructions on file for this visit.    Please note that this dictation was completed with Dragon and [email or web address], artificial intelligence software.  Quite often unanticipated grammatical, syntax, homophones, and other interpretive errors are inadvertently transcribed by the computer software.  Please disregard these errors.  Please excuse any errors that have escaped final proofreading.  Thank you.         The patient (or guardian, if applicable) and other

## 2024-07-30 RX ORDER — PANTOPRAZOLE SODIUM 40 MG/1
40 TABLET, DELAYED RELEASE ORAL 2 TIMES DAILY
Qty: 180 TABLET | Refills: 3 | Status: SHIPPED | OUTPATIENT
Start: 2024-07-30 | End: 2024-08-01

## 2024-07-30 RX ORDER — CARBINOXAMINE MALEATE 4 MG/1
TABLET ORAL
Qty: 180 TABLET | Refills: 3 | Status: SHIPPED | OUTPATIENT
Start: 2024-07-30

## 2024-08-01 ENCOUNTER — HOSPITAL ENCOUNTER (OUTPATIENT)
Facility: HOSPITAL | Age: 59
Setting detail: OUTPATIENT SURGERY
Discharge: HOME | End: 2024-08-01
Attending: INTERNAL MEDICINE | Admitting: INTERNAL MEDICINE
Payer: COMMERCIAL

## 2024-08-01 ENCOUNTER — ANESTHESIA (OUTPATIENT)
Facility: HOSPITAL | Age: 59
End: 2024-08-01
Payer: COMMERCIAL

## 2024-08-01 ENCOUNTER — ANESTHESIA EVENT (OUTPATIENT)
Facility: HOSPITAL | Age: 59
End: 2024-08-01
Payer: COMMERCIAL

## 2024-08-01 VITALS
SYSTOLIC BLOOD PRESSURE: 133 MMHG | RESPIRATION RATE: 26 BRPM | DIASTOLIC BLOOD PRESSURE: 79 MMHG | WEIGHT: 291.01 LBS | TEMPERATURE: 97.6 F | OXYGEN SATURATION: 99 % | HEART RATE: 68 BPM | HEIGHT: 71 IN | BODY MASS INDEX: 40.74 KG/M2

## 2024-08-01 PROCEDURE — 3700000001 HC ADD 15 MINUTES (ANESTHESIA): Performed by: INTERNAL MEDICINE

## 2024-08-01 PROCEDURE — 88342 IMHCHEM/IMCYTCHM 1ST ANTB: CPT

## 2024-08-01 PROCEDURE — 2580000003 HC RX 258: Performed by: NURSE ANESTHETIST, CERTIFIED REGISTERED

## 2024-08-01 PROCEDURE — 3600007502: Performed by: INTERNAL MEDICINE

## 2024-08-01 PROCEDURE — 88305 TISSUE EXAM BY PATHOLOGIST: CPT

## 2024-08-01 PROCEDURE — 2709999900 HC NON-CHARGEABLE SUPPLY: Performed by: INTERNAL MEDICINE

## 2024-08-01 PROCEDURE — 88312 SPECIAL STAINS GROUP 1: CPT

## 2024-08-01 PROCEDURE — 3600007512: Performed by: INTERNAL MEDICINE

## 2024-08-01 PROCEDURE — 7100000011 HC PHASE II RECOVERY - ADDTL 15 MIN: Performed by: INTERNAL MEDICINE

## 2024-08-01 PROCEDURE — 2580000003 HC RX 258: Performed by: INTERNAL MEDICINE

## 2024-08-01 PROCEDURE — 6360000002 HC RX W HCPCS: Performed by: NURSE ANESTHETIST, CERTIFIED REGISTERED

## 2024-08-01 PROCEDURE — 7100000010 HC PHASE II RECOVERY - FIRST 15 MIN: Performed by: INTERNAL MEDICINE

## 2024-08-01 PROCEDURE — 2500000003 HC RX 250 WO HCPCS: Performed by: NURSE ANESTHETIST, CERTIFIED REGISTERED

## 2024-08-01 PROCEDURE — 3700000000 HC ANESTHESIA ATTENDED CARE: Performed by: INTERNAL MEDICINE

## 2024-08-01 RX ORDER — PROPOFOL 10 MG/ML
INJECTION, EMULSION INTRAVENOUS CONTINUOUS PRN
Status: DISCONTINUED | OUTPATIENT
Start: 2024-08-01 | End: 2024-08-01 | Stop reason: SDUPTHER

## 2024-08-01 RX ORDER — DEXMEDETOMIDINE HYDROCHLORIDE 100 UG/ML
INJECTION, SOLUTION INTRAVENOUS PRN
Status: DISCONTINUED | OUTPATIENT
Start: 2024-08-01 | End: 2024-08-01 | Stop reason: SDUPTHER

## 2024-08-01 RX ORDER — SODIUM CHLORIDE 9 MG/ML
INJECTION, SOLUTION INTRAVENOUS CONTINUOUS
Status: DISCONTINUED | OUTPATIENT
Start: 2024-08-01 | End: 2024-08-01 | Stop reason: HOSPADM

## 2024-08-01 RX ORDER — LANSOPRAZOLE 30 MG/1
30 CAPSULE, DELAYED RELEASE ORAL DAILY
Qty: 90 CAPSULE | Refills: 3 | Status: SHIPPED | OUTPATIENT
Start: 2024-08-01

## 2024-08-01 RX ORDER — SODIUM CHLORIDE 9 MG/ML
INJECTION, SOLUTION INTRAVENOUS CONTINUOUS PRN
Status: DISCONTINUED | OUTPATIENT
Start: 2024-08-01 | End: 2024-08-01 | Stop reason: SDUPTHER

## 2024-08-01 RX ORDER — LIDOCAINE HYDROCHLORIDE 20 MG/ML
INJECTION, SOLUTION INTRAVENOUS PRN
Status: DISCONTINUED | OUTPATIENT
Start: 2024-08-01 | End: 2024-08-01 | Stop reason: SDUPTHER

## 2024-08-01 RX ADMIN — LIDOCAINE HYDROCHLORIDE 100 MG: 20 INJECTION, SOLUTION INTRAVENOUS at 12:04

## 2024-08-01 RX ADMIN — PROPOFOL 25 MG: 10 INJECTION, EMULSION INTRAVENOUS at 12:09

## 2024-08-01 RX ADMIN — PROPOFOL 50 MG: 10 INJECTION, EMULSION INTRAVENOUS at 12:05

## 2024-08-01 RX ADMIN — PROPOFOL 25 MG: 10 INJECTION, EMULSION INTRAVENOUS at 12:07

## 2024-08-01 RX ADMIN — PROPOFOL 40 MG: 10 INJECTION, EMULSION INTRAVENOUS at 12:22

## 2024-08-01 RX ADMIN — SODIUM CHLORIDE: 900 INJECTION, SOLUTION INTRAVENOUS at 12:00

## 2024-08-01 RX ADMIN — SODIUM CHLORIDE: 9 INJECTION, SOLUTION INTRAVENOUS at 11:45

## 2024-08-01 RX ADMIN — PROPOFOL 200 MCG/KG/MIN: 10 INJECTION, EMULSION INTRAVENOUS at 12:04

## 2024-08-01 RX ADMIN — DEXMEDETOMIDINE 5 MCG: 100 INJECTION, SOLUTION INTRAVENOUS at 12:02

## 2024-08-01 ASSESSMENT — PAIN - FUNCTIONAL ASSESSMENT: PAIN_FUNCTIONAL_ASSESSMENT: 0-10

## 2024-08-01 NOTE — H&P
[facility]  Neeraj Smith M.D.  [phone]    History and Physical       NAME:  Vicente Blue   :   1965   MRN:   [id number]           Consult Date: 2024 11:44 AM    Chief Complaint:  Iron deficiency anemia and colon cancer screening    History of Present Illness:  Patient is a 59 y.o. who is seen for iron deficiency anemia, follow-up on recently diagnosed esophagitis and colon cancer screening. Denies any ongoing GI complaints.      PMH:  Past Medical History:   Diagnosis Date    Chronic pain     bilateral feet,neuropathy    Neuropathy associated with endocrine disorder ([facility]) 3/11/2010    Other ill-defined conditions(799.89)     POEMS Syndrome, seen at [facility]    POEMS syndrome 3/11/2010       PSH:  Past Surgical History:   Procedure Laterality Date    BIOPSY LIVER      CELL COUNT, CSF      x 2    COLONOSCOPY      Upper and Lower     FOOT/TOES SURGERY PROC UNLISTED      Remove Nerve from Left foot     GI      liver stent    MRI BRAIN W WO CONTRAST      ORTHOPEDIC SURGERY      STENT INSERTION      Liver to Kidneys     UPPER GASTROINTESTINAL ENDOSCOPY N/A 2024    ESOPHAGOGASTRODUODENOSCOPY performed by Neeraj Smith MD at [facility] ENDOSCOPY    UPPER GASTROINTESTINAL ENDOSCOPY N/A 2024    ESOPHAGOGASTRODUODENOSCOPY BIOPSY performed by Neeraj Smith MD at [facility] ENDOSCOPY       Allergies:  Allergies   Allergen Reactions    Heparin Other (See Comments)     Questionable per patient ,back lesions.       Home Medications:  Prior to Admission Medications   Prescriptions Last Dose Informant Patient Reported? Taking?   Carbinoxamine Maleate 4 MG TABS Past Month  No No   Sig: TAKE 1 TABLET BY MOUTH TWICE A DAY   WIXELA INHUB 250-50 MCG/ACT AEPB diskus inhaler Not Taking  No No   Sig: INHALE 1 PUFF INTO THE LUNGS IN THE MORNING AND IN THE EVENING   Patient not taking: Reported on 2024   acetaminophen (TYLENOL) 325 MG tablet Past Week  Yes No   Sig: Take by mouth every 6 hours as needed  facility-administered medications for this encounter.       Social History:  Social History     Tobacco Use    Smoking status: Never    Smokeless tobacco: Never   Substance Use Topics    Alcohol use: Yes       Family History:  History reviewed. No pertinent family history.          Review of Systems:      Constitutional: negative fever, negative chills, negative weight loss  Eyes:   negative visual changes  ENT:   negative sore throat, tongue or lip swelling  Respiratory:  negative cough, negative dyspnea  Cards:  negative for chest pain, palpitations, lower extremity edema  GI:   See HPI  :  negative for frequency, dysuria  Integument:  negative for rash and pruritus  Heme:  negative for easy bruising and gum/nose bleeding  Musculoskel: negative for myalgias,  back pain and muscle weakness  Neuro: negative for headaches, dizziness, vertigo  Psych:  negative for feelings of anxiety, depression       Objective:   Patient Vitals for the past 8 hrs:   BP Temp Temp src Pulse Resp SpO2 Height Weight   08/01/24 1141 136/81 97.5 °F (36.4 °C) Oral 68 18 98 % 1.803 m (5' 11\") 132 kg (291 lb 0.1 oz)     No intake/output data recorded.  No intake/output data recorded.    EXAM:     NEURO-a&o   HEENT-wnl   LUNGS-clear    COR-regular rate and rhythym     ABD-soft , no tenderness, no rebound, good bs     EXT-no edema     Data Review     No results for input(s): \"WBC\", \"HGB\", \"HCT\", \"PLT\" in the last 72 hours.  No results for input(s): \"NA\", \"K\", \"CL\", \"CO2\", \"BUN\", \"GLU\", \"PHOS\" in the last 72 hours.    Invalid input(s): \"CREA\", \"CA\"  No results for input(s): \"TP\", \"GLOB\", \"GGT\" in the last 72 hours.    Invalid input(s): \"SGOT\", \"GPT\", \"AP\", \"TBIL\", \"ALB\", \"AML\", \"AMYP\", \"LPSE\", \"HLPSE\"  No results for input(s): \"INR\", \"APTT\" in the last 72 hours.    Invalid input(s): \"PTP\"    Patient Active Problem List   Diagnosis    PUD (peptic ulcer disease)    Syncope    Acute blood loss anemia    NSTEMI (non-ST elevated myocardial

## 2024-08-01 NOTE — OP NOTE
[facility]  Neeraj Smith M.D.  [phone]            2024          Colonoscopy Operative Report  Vicente Blue  :  1965  [facility] Medical Record Number:  [id number]      Indications:    Screening colonoscopy     :  Neeraj Smith MD    Referring Provider: Viktor Yang MD    Sedation:  MAC anesthesia    Pre-Procedural Exam:      Airway: clear,  No airway problems anticipated  Heart: RRR, without gallops or rubs  Lungs: clear bilaterally without wheezes, crackles, or rhonchi  Abdomen: soft, nontender, nondistended, bowel sounds present  Mental Status: awake, alert and oriented to person, place and time     Procedure Details:  After informed consent was obtained with all risks and benefits of procedure explained and preoperative exam completed, the patient was taken to the endoscopy suite and placed in the left lateral decubitus position.  Upon sequential sedation as per above, a digital rectal exam was performed. The Olympus videocolonoscope  was inserted in the rectum and carefully advanced to the cecum, which was identified by the ileocecal valve and appendiceal orifice.  The quality of preparation was good.  The colonoscope was slowly withdrawn with careful inspection and evaluation between folds. Retroflexion in the rectum was performed.    Findings:   Terminal Ileum: normal  Cecum: normal  Ascending Colon: normal  Transverse Colon: 1  Sessile polyp(s), the largest 3 mm in size;  Descending Colon: normal  Sigmoid: no mucosal lesion appreciated  mild diverticulosis;  Rectum: no mucosal lesion appreciated  Grade 1 internal hemorrhoid(s);    Interventions:  1 complete polypectomy were performed using cold snare  and the polyps were  retrieved    Specimen Removed:  specimen #1, 3 mm in size, located in the transverse colon removed by cold snare and retrieved for pathology    Complications: None.     EBL:  None.    Impression:  One diminutive polyp removed and sent to 
hiatal hernia    Recommendations:    -Continue acid suppression.  -Await pathology.  -Continue with anti-reflux measures    Neeraj Smith MD

## 2024-08-01 NOTE — ANESTHESIA PRE PROCEDURE
Department of Anesthesiology  Preprocedure Note       Name:  Vicente Blue   Age:  59 y.o.  :  1965                                          MRN:  [id number]         Date:  2024      Surgeon: Surgeon(s):  Neeraj Smith MD    Procedure: Procedure(s):  ESOPHAGOGASTRODUODENOSCOPY  COLONOSCOPY DIAGNOSTIC    Medications prior to admission:   Prior to Admission medications    Medication Sig Start Date End Date Taking? Authorizing Provider   Carbinoxamine Maleate 4 MG TABS TAKE 1 TABLET BY MOUTH TWICE A DAY 24   Viktor Yang MD   pantoprazole (PROTONIX) 40 MG tablet TAKE 1 TABLET BY MOUTH TWICE A DAY 24   Viktor Yang MD   pregabalin (LYRICA) 50 MG capsule Take 1 capsule by mouth in the morning and at bedtime. Max Daily Amount: 100 mg  Patient not taking: Reported on 2024  Viktor Yang MD   methylphenidate (METADATE CD) 40 MG extended release capsule Take 1 capsule by mouth daily for 29 days. Max Daily Amount: 40 mg 24  Viktor Yang MD   ondansetron (ZOFRAN-ODT) 4 MG disintegrating tablet Take 1 tablet by mouth 3 times daily as needed for Nausea or Vomiting  Patient not taking: Reported on 2024   Harry Graves MD   tamsulosin (FLOMAX) 0.4 MG capsule TAKE 1 CAPSULE BY MOUTH EVERY DAY 24   Viktor Yang MD   WIXELA INHUB 250-50 MCG/ACT AEPB diskus inhaler INHALE 1 PUFF INTO THE LUNGS IN THE MORNING AND IN THE EVENING  Patient not taking: Reported on 2024   Viktor Yang MD   sildenafil (VIAGRA) 100 MG tablet TAKE 1 TABLET BY MOUTH AS NEEDED FOR ERECTILE DYSFUNCTION 24   Viktor Yang MD   acetaminophen (TYLENOL) 325 MG tablet Take by mouth every 6 hours as needed 22   Automatic Reconciliation, Ar   albuterol sulfate HFA (PROVENTIL;VENTOLIN;PROAIR) 108 (90 Base) MCG/ACT inhaler Inhale 2 puffs into the lungs every 4 hours as needed 22   Automatic Reconciliation, Ar   methylphenidate (METADATE CD) 40 MG extended release 
08/15/2021 03:30 PM    APTT 28.6 08/15/2021 03:30 PM       HCG (If Applicable): No results found for: \"PREGTESTUR\", \"PREGSERUM\", \"HCG\", \"HCGQUANT\"     ABGs: No results found for: \"PHART\", \"PO2ART\", \"EQZ3QBG\", \"NIT9KMA\", \"BEART\", \"Y5JIIXZD\"     Type & Screen (If Applicable):  No results found for: \"LABABO\"    Drug/Infectious Status (If Applicable):  No results found for: \"HIV\", \"HEPCAB\"    COVID-19 Screening (If Applicable):   Lab Results   Component Value Date/Time    COVID19 Not Detected 01/16/2022 08:15 PM    COVID19 Please find results under separate order 01/16/2022 07:23 PM    COVID19 Not detected 01/16/2022 07:23 PM           Anesthesia Evaluation     Anesthesia Plan        Nicholas Garcia, APRN - CRNA   8/1/2024

## 2024-08-01 NOTE — ANESTHESIA POSTPROCEDURE EVALUATION
Department of Anesthesiology  Postprocedure Note    Patient: Vicente Blue  MRN: [id number]  YOB: 1965  Date of evaluation: 8/1/2024    Procedure Summary       Date: 08/01/24 Room / Location: [place] [address] / [facility] ENDOSCOPY    Anesthesia Start: 1200 Anesthesia Stop: 1229    Procedures:       ESOPHAGOGASTRODUODENOSCOPY      COLONOSCOPY DIAGNOSTIC      ESOPHAGOGASTRODUODENOSCOPY BIOPSY (Upper GI Region)      COLONOSCOPY POLYPECTOMY SNARE/BIOPSY (Lower GI Region) Diagnosis:       Iron deficiency anemia, unspecified iron deficiency anemia type      Biliary esophagitis      (Iron deficiency anemia, unspecified iron deficiency anemia type [D50.9])      (Biliary esophagitis [K21.00])    Surgeons: Neeraj Smith MD Responsible Provider: Evelio Rosa MD    Anesthesia Type: MAC ASA Status: 3            Anesthesia Type: No value filed.    Allie Phase I: Allie Score: 10    Allie Phase II: Allie Score: 10    Anesthesia Post Evaluation    Patient location during evaluation: PACU  Patient participation: complete - patient participated  Level of consciousness: awake and alert  Airway patency: patent  Nausea & Vomiting: no vomiting and no nausea  Cardiovascular status: hemodynamically stable  Respiratory status: room air  Hydration status: stable  There was medical reason for not using a multimodal analgesia pain management approach.    No notable events documented.

## 2024-08-01 NOTE — PERIOP NOTE
Arrived to post op and placed on bedside cardiac  monitor. VS per documentation. Oral airway in place. Maintain 1:1.

## 2024-08-01 NOTE — DISCHARGE INSTRUCTIONS
[facility]  Neeraj Smith M.D.  [phone]                 COLON and EGD DISCHARGE INSTRUCTIONS    2024    Vicente Blue  :  1965  [facility] Medical Record Number:  [id number]      DISCOMFORT:  Sore throat- throat lozenges or warm salt water gargle  Redness at IV site- apply warm compress to area; if redness or soreness persist- contact your physician  There may be a slight amount of blood passed from the rectum  Gaseous discomfort- walking, belching will help relieve any discomfort  You may not operate a vehicle for 12 hours  You may not engage in an occupation involving machinery or appliances for rest of today  You may not drink alcoholic beverages for at least 12 hours  Avoid making any critical decisions for at least 24 hour  DIET:   High fiber diet   - however -  remember your colon is empty and a heavy meal will produce gas.   Avoid these foods:  vegetables, fried / greasy foods, carbonated drinks for today     ACTIVITY:  You may  resume your normal daily activities it is recommended that you spend the remainder of the day resting -  avoid any strenuous activity and driving.    CALL M.DKlaudia  ANY SIGN OF:   Increasing pain, nausea, vomiting  Abdominal distension (swelling)  New increased bleeding (oral or rectal)  Fever (chills)  Pain in chest area  Bloody discharge from nose or mouth  Shortness of breath      Follow-up Instructions:   Call Dr. Smith if any questions at [phone].  Results of procedure / biopsy in 7 to 10 days, we will call you with these results.  Your endoscopy showed resolved esophagitis with residual changes from acid reflux that could be chronic and consistent with Ivy's esophagus. Biopsies were obtained. Hiatal hernia seen. Continue with strict anti-reflux measures and current medications to keep acid suppression.   Your colonoscopy showed one diminutive polyp that was removed and sent to pathology, mild diverticulosis and small internal hemorrhoids.

## 2024-08-15 ENCOUNTER — TELEMEDICINE (OUTPATIENT)
Age: 59
End: 2024-08-15
Payer: COMMERCIAL

## 2024-08-15 ENCOUNTER — PATIENT MESSAGE (OUTPATIENT)
Age: 59
End: 2024-08-15

## 2024-08-15 DIAGNOSIS — M79.10 MYALGIA: ICD-10-CM

## 2024-08-15 DIAGNOSIS — D47.2 POEMS SYNDROME: ICD-10-CM

## 2024-08-15 DIAGNOSIS — E34.9 POEMS SYNDROME: ICD-10-CM

## 2024-08-15 DIAGNOSIS — D62 ACUTE BLOOD LOSS ANEMIA: ICD-10-CM

## 2024-08-15 DIAGNOSIS — R23.9 POEMS SYNDROME: ICD-10-CM

## 2024-08-15 DIAGNOSIS — G62.9 NEUROPATHY: Primary | ICD-10-CM

## 2024-08-15 DIAGNOSIS — G62.9 POEMS SYNDROME: ICD-10-CM

## 2024-08-15 DIAGNOSIS — D50.9 IRON DEFICIENCY ANEMIA, UNSPECIFIED IRON DEFICIENCY ANEMIA TYPE: ICD-10-CM

## 2024-08-15 DIAGNOSIS — K27.9 PUD (PEPTIC ULCER DISEASE): ICD-10-CM

## 2024-08-15 PROCEDURE — 99214 OFFICE O/P EST MOD 30 MIN: CPT | Performed by: FAMILY MEDICINE

## 2024-08-15 SDOH — ECONOMIC STABILITY: FOOD INSECURITY: WITHIN THE PAST 12 MONTHS, THE FOOD YOU BOUGHT JUST DIDN'T LAST AND YOU DIDN'T HAVE MONEY TO GET MORE.: NEVER TRUE

## 2024-08-15 SDOH — ECONOMIC STABILITY: FOOD INSECURITY: WITHIN THE PAST 12 MONTHS, YOU WORRIED THAT YOUR FOOD WOULD RUN OUT BEFORE YOU GOT MONEY TO BUY MORE.: NEVER TRUE

## 2024-08-15 SDOH — ECONOMIC STABILITY: INCOME INSECURITY: HOW HARD IS IT FOR YOU TO PAY FOR THE VERY BASICS LIKE FOOD, HOUSING, MEDICAL CARE, AND HEATING?: NOT HARD AT ALL

## 2024-08-15 ASSESSMENT — PATIENT HEALTH QUESTIONNAIRE - PHQ9
SUM OF ALL RESPONSES TO PHQ QUESTIONS 1-9: 0
SUM OF ALL RESPONSES TO PHQ QUESTIONS 1-9: 0
SUM OF ALL RESPONSES TO PHQ9 QUESTIONS 1 & 2: 0
2. FEELING DOWN, DEPRESSED OR HOPELESS: NOT AT ALL
SUM OF ALL RESPONSES TO PHQ QUESTIONS 1-9: 0
1. LITTLE INTEREST OR PLEASURE IN DOING THINGS: NOT AT ALL
SUM OF ALL RESPONSES TO PHQ QUESTIONS 1-9: 0

## 2024-08-15 NOTE — PROGRESS NOTES
Vicente Blue (:  1965) is a 59 y.o. male, Established patient, here for evaluation of the following chief complaint(s):  No chief complaint on file.         Assessment & Plan  1. Neuropathy.  The patient reports persistent foot pain attributed to neuropathy. He is currently taking Lyrica but finds it makes him lethargic, especially when driving. It is recommended to take Lyrica 50 mg three times a day consistently for the next month. If there is no improvement, consideration will be given to initiating oxycodone. He is advised to avoid driving if Lyrica causes significant drowsiness.    2. Anemia.  The patient reports low energy levels and lethargy, which may be related to his anemia. He has been receiving iron transfusions regularly, with at least eight transfusions in the past year and a half. Continuation of iron therapy is recommended. Support for disability application is offered, and assistance will be provided in completing the FML paperwork for the next month. He is advised to consult a disability  for further assistance.    3. Gastroesophageal Reflux Disease (GERD).  The patient reports a history of acid reflux causing damage to his esophagus, contributing to his anemia. He is advised to continue monitoring his symptoms and follow up with his gastroenterologist as needed.    Follow-up  The patient will follow up in a few weeks.    Results    1. Neuropathy  2. POEMS syndrome  3. Acute blood loss anemia  4. PUD (peptic ulcer disease)  5. Iron deficiency anemia, unspecified iron deficiency anemia type  6. Myalgia    No follow-ups on file.       Subjective   History of Present Illness  The patient presents for evaluation of multiple medical concerns.    He continues to experience foot pain, which he attributes to neuropathy from Parkinson's disease. This chronic issue has been managed with Lyrica, but the medication induces fatigue and lethargy, which is particularly problematic as his

## 2024-08-20 ENCOUNTER — CLINICAL DOCUMENTATION (OUTPATIENT)
Age: 59
End: 2024-08-20

## 2024-08-27 ENCOUNTER — CLINICAL DOCUMENTATION (OUTPATIENT)
Age: 59
End: 2024-08-27

## 2024-08-27 NOTE — PROGRESS NOTES
[employer] attending physicians form was placed on Dr. Yang desk for completion & copy placed in Belen's holding folder.

## 2024-09-04 ENCOUNTER — CLINICAL DOCUMENTATION (OUTPATIENT)
Age: 59
End: 2024-09-04

## 2024-09-04 ENCOUNTER — TELEMEDICINE (OUTPATIENT)
Age: 59
End: 2024-09-04
Payer: COMMERCIAL

## 2024-09-04 DIAGNOSIS — D47.2 POEMS SYNDROME: Primary | ICD-10-CM

## 2024-09-04 DIAGNOSIS — G62.9 POEMS SYNDROME: Primary | ICD-10-CM

## 2024-09-04 DIAGNOSIS — N39.41 URGENCY INCONTINENCE: ICD-10-CM

## 2024-09-04 DIAGNOSIS — F90.2 ATTENTION DEFICIT HYPERACTIVITY DISORDER (ADHD), COMBINED TYPE: ICD-10-CM

## 2024-09-04 DIAGNOSIS — M79.10 MYALGIA: ICD-10-CM

## 2024-09-04 DIAGNOSIS — R23.9 POEMS SYNDROME: Primary | ICD-10-CM

## 2024-09-04 DIAGNOSIS — E34.9 POEMS SYNDROME: Primary | ICD-10-CM

## 2024-09-04 PROCEDURE — 99214 OFFICE O/P EST MOD 30 MIN: CPT | Performed by: FAMILY MEDICINE

## 2024-09-04 RX ORDER — METHYLPHENIDATE HYDROCHLORIDE 40 MG/1
40 CAPSULE, EXTENDED RELEASE ORAL DAILY
Qty: 30 CAPSULE | Refills: 0 | Status: SHIPPED | OUTPATIENT
Start: 2024-09-04 | End: 2024-10-03

## 2024-09-04 RX ORDER — METHYLPHENIDATE HYDROCHLORIDE 40 MG/1
40 CAPSULE, EXTENDED RELEASE ORAL DAILY
Qty: 30 CAPSULE | Refills: 0 | Status: SHIPPED | OUTPATIENT
Start: 2024-11-03 | End: 2024-12-02

## 2024-09-04 RX ORDER — PREGABALIN 100 MG/1
100 CAPSULE ORAL 3 TIMES DAILY
Qty: 90 CAPSULE | Refills: 2 | Status: SHIPPED | OUTPATIENT
Start: 2024-09-04 | End: 2025-09-04

## 2024-09-04 RX ORDER — METHYLPHENIDATE HYDROCHLORIDE 40 MG/1
40 CAPSULE, EXTENDED RELEASE ORAL DAILY
Qty: 30 CAPSULE | Refills: 0 | Status: SHIPPED | OUTPATIENT
Start: 2024-10-04 | End: 2024-11-02

## 2024-09-04 RX ORDER — TOLTERODINE 4 MG/1
4 CAPSULE, EXTENDED RELEASE ORAL DAILY
Qty: 30 CAPSULE | Refills: 3 | Status: SHIPPED | OUTPATIENT
Start: 2024-09-04

## 2024-09-04 NOTE — PROGRESS NOTES
Vicente Blue (:  1965) is a 59 y.o. male, Established patient, here for evaluation of the following chief complaint(s):  No chief complaint on file.         Assessment & Plan  1. Joint pain.  The patient's joint pain persists, particularly in the knee, making it difficult for him to get up from a chair. The current dosage of Lyrica (50 mg three times a day) is providing minimal relief. The dosage of Lyrica will be increased to 100 mg three times a day. A prescription for the higher dose will be called into [facility].    2. Urinary incontinence.  He experiences difficulty controlling his bladder, especially when not near a bathroom. This suggests bladder spasms. Detrol LA will be prescribed, to be taken once daily. He has been informed about potential side effects, including dry mouth and constipation.    3. Medication refill.  He requires a refill of his methylphenidate prescription. A refill will be called into [facility].    4. Work note.  A work note will be provided, stating that he is out of work starting today until the near future, with no specified end date. This note will be available on [employer] for him to download and present to his employer.      Results    1. POEMS syndrome  -     pregabalin (LYRICA) 100 MG capsule; Take 1 capsule by mouth 3 times daily. Max Daily Amount: 300 mg, Disp-90 capsule, R-2Normal  2. Myalgia  -     pregabalin (LYRICA) 100 MG capsule; Take 1 capsule by mouth 3 times daily. Max Daily Amount: 300 mg, Disp-90 capsule, R-2Normal  3. Urgency incontinence  -     tolterodine (DETROL LA) 4 MG extended release capsule; Take 1 capsule by mouth daily, Disp-30 capsule, R-3Normal  4. Attention deficit hyperactivity disorder (ADHD), combined type  -     methylphenidate (METADATE CD) 40 MG extended release capsule; Take 1 capsule by mouth daily for 29 days. Max Daily Amount: 40 mg, Disp-30 capsule, R-0Normal  -     methylphenidate (METADATE CD) 40 MG extended release capsule; Take 1 capsule by

## 2024-09-04 NOTE — PROGRESS NOTES
[employer] attending physicians form was completed and faxed to [phone], confirmed, scanned.  [facility] message sent to the patient.

## 2024-10-17 ENCOUNTER — TELEMEDICINE (OUTPATIENT)
Age: 59
End: 2024-10-17
Payer: COMMERCIAL

## 2024-10-17 DIAGNOSIS — M79.10 MYALGIA: ICD-10-CM

## 2024-10-17 DIAGNOSIS — G62.9 NEUROPATHY: Primary | ICD-10-CM

## 2024-10-17 DIAGNOSIS — F90.2 ATTENTION DEFICIT HYPERACTIVITY DISORDER (ADHD), COMBINED TYPE: ICD-10-CM

## 2024-10-17 PROCEDURE — 99214 OFFICE O/P EST MOD 30 MIN: CPT | Performed by: FAMILY MEDICINE

## 2024-10-17 RX ORDER — LANSOPRAZOLE 30 MG/1
30 CAPSULE, DELAYED RELEASE ORAL DAILY
Qty: 90 CAPSULE | Refills: 3 | Status: SHIPPED | OUTPATIENT
Start: 2024-10-17

## 2024-10-17 RX ORDER — METHYLPHENIDATE HYDROCHLORIDE 40 MG/1
40 CAPSULE, EXTENDED RELEASE ORAL DAILY
Qty: 30 CAPSULE | Refills: 0 | Status: SHIPPED | OUTPATIENT
Start: 2024-10-17 | End: 2024-11-15

## 2024-10-17 RX ORDER — METHYLPHENIDATE HYDROCHLORIDE 40 MG/1
40 CAPSULE, EXTENDED RELEASE ORAL DAILY
Qty: 30 CAPSULE | Refills: 0 | Status: SHIPPED | OUTPATIENT
Start: 2024-11-16 | End: 2024-12-15

## 2024-10-17 RX ORDER — METHYLPHENIDATE HYDROCHLORIDE 40 MG/1
40 CAPSULE, EXTENDED RELEASE ORAL DAILY
Qty: 30 CAPSULE | Refills: 0 | Status: SHIPPED | OUTPATIENT
Start: 2024-12-16 | End: 2025-01-14

## 2024-10-17 SDOH — ECONOMIC STABILITY: FOOD INSECURITY: WITHIN THE PAST 12 MONTHS, YOU WORRIED THAT YOUR FOOD WOULD RUN OUT BEFORE YOU GOT MONEY TO BUY MORE.: NEVER TRUE

## 2024-10-17 SDOH — ECONOMIC STABILITY: FOOD INSECURITY: WITHIN THE PAST 12 MONTHS, THE FOOD YOU BOUGHT JUST DIDN'T LAST AND YOU DIDN'T HAVE MONEY TO GET MORE.: NEVER TRUE

## 2024-10-17 SDOH — ECONOMIC STABILITY: INCOME INSECURITY: HOW HARD IS IT FOR YOU TO PAY FOR THE VERY BASICS LIKE FOOD, HOUSING, MEDICAL CARE, AND HEATING?: NOT HARD AT ALL

## 2024-10-17 ASSESSMENT — PATIENT HEALTH QUESTIONNAIRE - PHQ9
1. LITTLE INTEREST OR PLEASURE IN DOING THINGS: NOT AT ALL
SUM OF ALL RESPONSES TO PHQ QUESTIONS 1-9: 0
2. FEELING DOWN, DEPRESSED OR HOPELESS: NOT AT ALL
SUM OF ALL RESPONSES TO PHQ9 QUESTIONS 1 & 2: 0

## 2024-10-17 NOTE — PROGRESS NOTES
deliver care in the state where the patient is located as indicated above. If you are not or unsure, please re-schedule the visit: Yes, I confirm.       Time was used for level of billing: yes, 30 minutes reviewing previous notes, test results and office visit with the patient discussing the diagnosis and importance of compliance with the treatment plan as well as documenting on the day of the visit.    --Viktor Yang MD on 4/17/2024 at 8:51 AM  An electronic signature was used to authenticate this note.    I have discussed the diagnosis with the patient and the intended plan as seen in the above orders.  The patient understands and agrees with the plan. The patient has received an after-visit summary and questions were answered concerning future plans.     Medication Side Effects and Warnings were discussed with patient  Patient Labs were reviewed and or requested:  Patient Past Records were reviewed and or requested    Viktor Yang M.D.    There are no Patient Instructions on file for this visit.    Please note that this dictation was completed with Dragon and [employer], artificial intelligence software.  Quite often unanticipated grammatical, syntax, homophones, and other interpretive errors are inadvertently transcribed by the computer software.  Please disregard these errors.  Please excuse any errors that have escaped final proofreading.  Thank you.         The patient (or guardian, if applicable) and other individuals in attendance with the patient were advised that Artificial Intelligence will be utilized during this visit to record and process the conversation to generate a clinical note. The patient (or guardian, if applicable) and other individuals in attendance at the appointment consented to the use of AI, including the recording.      An electronic signature was used to authenticate this note.    --Viktor Yang MD

## 2024-10-18 ENCOUNTER — TELEPHONE (OUTPATIENT)
Age: 59
End: 2024-10-18

## 2024-10-18 NOTE — TELEPHONE ENCOUNTER
Pt came into office and dropped off a [employer] form to be completed please. Please call hsi wife Daniela on [employer]IP[employer] at [phone] when ready for , placed on your desk. Thanks.

## 2024-10-21 DIAGNOSIS — N39.41 URGENCY INCONTINENCE: ICD-10-CM

## 2024-10-22 RX ORDER — TOLTERODINE 4 MG/1
CAPSULE, EXTENDED RELEASE ORAL DAILY
Qty: 90 CAPSULE | Refills: 1 | Status: SHIPPED | OUTPATIENT
Start: 2024-10-22

## 2024-10-22 NOTE — TELEPHONE ENCOUNTER
Called and spoke with patient's wife. Advised that form has been completed and is ready for . Wife verbalized understanding. Copy placed in scan.

## 2024-11-21 ENCOUNTER — CLINICAL DOCUMENTATION (OUTPATIENT)
Facility: CLINIC | Age: 59
End: 2024-11-21

## 2024-12-03 ENCOUNTER — TELEPHONE (OUTPATIENT)
Facility: CLINIC | Age: 59
End: 2024-12-03

## 2024-12-03 NOTE — TELEPHONE ENCOUNTER
Patient would like to discuss his [facility] Paperwork as to how long he was given to be out of work. He would like to discuss several other issues as well. Phone: [phone]

## 2024-12-04 NOTE — TELEPHONE ENCOUNTER
Patient states you offered him pain medication or lyrica, and he has tried the lyrica and doesn't really like the way it makes him feel. He would just like to stop lyrica and get an as needed pain medication that you talked about during his appointment.  He also states workman comp stopped his payments after three months because they told him that is what Dr. Yang wrote. I will refax paperwork to [employer] that states out of work permanently.  Please address the pain issue.

## 2024-12-05 ENCOUNTER — TELEMEDICINE (OUTPATIENT)
Facility: CLINIC | Age: 59
End: 2024-12-05
Payer: COMMERCIAL

## 2024-12-05 DIAGNOSIS — D47.2 POEMS SYNDROME: Primary | ICD-10-CM

## 2024-12-05 DIAGNOSIS — D50.9 IRON DEFICIENCY ANEMIA, UNSPECIFIED IRON DEFICIENCY ANEMIA TYPE: ICD-10-CM

## 2024-12-05 DIAGNOSIS — M79.10 MYALGIA: ICD-10-CM

## 2024-12-05 DIAGNOSIS — G62.9 POEMS SYNDROME: Primary | ICD-10-CM

## 2024-12-05 DIAGNOSIS — R23.9 POEMS SYNDROME: Primary | ICD-10-CM

## 2024-12-05 DIAGNOSIS — G62.9 NEUROPATHY: ICD-10-CM

## 2024-12-05 DIAGNOSIS — E34.9 POEMS SYNDROME: Primary | ICD-10-CM

## 2024-12-05 PROCEDURE — 99214 OFFICE O/P EST MOD 30 MIN: CPT | Performed by: FAMILY MEDICINE

## 2024-12-05 RX ORDER — LANSOPRAZOLE 30 MG/1
30 CAPSULE, DELAYED RELEASE ORAL DAILY
Qty: 90 CAPSULE | Refills: 3 | Status: SHIPPED | OUTPATIENT
Start: 2024-12-05

## 2024-12-05 RX ORDER — HYDROCODONE BITARTRATE AND ACETAMINOPHEN 5; 325 MG/1; MG/1
1 TABLET ORAL 2 TIMES DAILY
Qty: 60 TABLET | Refills: 0 | Status: SHIPPED | OUTPATIENT
Start: 2024-12-05 | End: 2025-01-04

## 2024-12-05 RX ORDER — PREGABALIN 50 MG/1
50 CAPSULE ORAL 3 TIMES DAILY
Qty: 90 CAPSULE | Refills: 2 | Status: SHIPPED | OUTPATIENT
Start: 2024-12-05 | End: 2025-12-05

## 2024-12-05 NOTE — PROGRESS NOTES
Vicente Blue (:  1965) is a 59 y.o. male, Established patient, here for evaluation of the following chief complaint(s):  No chief complaint on file.         Assessment & Plan  1. Neuropathy.  He reports that Lyrica has been helping with the burning sensation associated with neuropathy. The dosage of Lyrica will be reduced to 50 mg, to be taken 3 times daily. Additionally, hydrocodone will be introduced into his regimen, to be taken twice daily. The prescription will be sent to [facility] on [address]. If he experiences any adverse effects or finds the new regimen ineffective, he is advised to contact the office.    2. Weight management.  He has gained some weight and needs to get rid of it. He believes that losing weight will help alleviate his pain and improve his ability to work out. He is encouraged to focus on a balanced diet and regular exercise to aid in weight loss. The pain management plan, including the adjusted Lyrica and added hydrocodone, aims to help him manage pain better so he can engage in physical activities.    3. Low iron level.  His iron levels have improved, which has positively impacted his breathing and overall energy levels.    4. Medication management.  He mentioned that he has not received his pantoprazole prescription for the past 3 months. A prescription for pantoprazole will be provided to manage his throat condition. The prescription will be sent to [facility] on [address].    Results  Laboratory Studies  Iron level is back up.  1. POEMS syndrome  -     pregabalin (LYRICA) 50 MG capsule; Take 1 capsule by mouth 3 times daily. Max Daily Amount: 150 mg, Disp-90 capsule, R-2Normal  -     HYDROcodone-acetaminophen (NORCO) 5-325 MG per tablet; Take 1 tablet by mouth in the morning and at bedtime for 30 days. Max Daily Amount: 2 tablets, Disp-60 tablet, R-0Normal  2. Neuropathy  -     pregabalin (LYRICA) 50 MG capsule; Take 1 capsule by mouth 3 times daily. Max Daily Amount: 150

## 2024-12-06 ENCOUNTER — TELEPHONE (OUTPATIENT)
Facility: CLINIC | Age: 59
End: 2024-12-06

## 2024-12-30 ENCOUNTER — TELEPHONE (OUTPATIENT)
Facility: CLINIC | Age: 59
End: 2024-12-30

## 2025-01-06 ENCOUNTER — TELEMEDICINE (OUTPATIENT)
Facility: CLINIC | Age: 60
End: 2025-01-06
Payer: COMMERCIAL

## 2025-01-06 DIAGNOSIS — M79.10 MYALGIA: ICD-10-CM

## 2025-01-06 DIAGNOSIS — E34.9 POEMS SYNDROME: ICD-10-CM

## 2025-01-06 DIAGNOSIS — G62.9 NEUROPATHY: ICD-10-CM

## 2025-01-06 DIAGNOSIS — G62.9 POEMS SYNDROME: ICD-10-CM

## 2025-01-06 DIAGNOSIS — F90.9 ATTENTION DEFICIT HYPERACTIVITY DISORDER (ADHD), UNSPECIFIED ADHD TYPE: Primary | ICD-10-CM

## 2025-01-06 DIAGNOSIS — R23.9 POEMS SYNDROME: ICD-10-CM

## 2025-01-06 DIAGNOSIS — D47.2 POEMS SYNDROME: ICD-10-CM

## 2025-01-06 PROCEDURE — 99213 OFFICE O/P EST LOW 20 MIN: CPT | Performed by: FAMILY MEDICINE

## 2025-01-06 RX ORDER — HYDROCODONE BITARTRATE AND ACETAMINOPHEN 5; 325 MG/1; MG/1
1 TABLET ORAL 2 TIMES DAILY
Qty: 60 TABLET | Refills: 0 | Status: SHIPPED | OUTPATIENT
Start: 2025-01-06 | End: 2025-02-05

## 2025-01-06 RX ORDER — METHYLPHENIDATE HYDROCHLORIDE 40 MG/1
40 CAPSULE, EXTENDED RELEASE ORAL DAILY
Qty: 30 CAPSULE | Refills: 0 | Status: SHIPPED | OUTPATIENT
Start: 2025-01-06 | End: 2025-02-04

## 2025-01-06 RX ORDER — METHYLPHENIDATE HYDROCHLORIDE 40 MG/1
40 CAPSULE, EXTENDED RELEASE ORAL DAILY
Qty: 30 CAPSULE | Refills: 0 | Status: SHIPPED | OUTPATIENT
Start: 2025-02-05 | End: 2025-03-06

## 2025-01-06 RX ORDER — METHYLPHENIDATE HYDROCHLORIDE 40 MG/1
40 CAPSULE, EXTENDED RELEASE ORAL DAILY
Qty: 30 CAPSULE | Refills: 0 | Status: SHIPPED | OUTPATIENT
Start: 2025-03-07 | End: 2025-04-05

## 2025-01-06 SDOH — ECONOMIC STABILITY: FOOD INSECURITY: WITHIN THE PAST 12 MONTHS, THE FOOD YOU BOUGHT JUST DIDN'T LAST AND YOU DIDN'T HAVE MONEY TO GET MORE.: NEVER TRUE

## 2025-01-06 SDOH — ECONOMIC STABILITY: FOOD INSECURITY: WITHIN THE PAST 12 MONTHS, YOU WORRIED THAT YOUR FOOD WOULD RUN OUT BEFORE YOU GOT MONEY TO BUY MORE.: NEVER TRUE

## 2025-01-06 SDOH — ECONOMIC STABILITY: INCOME INSECURITY: HOW HARD IS IT FOR YOU TO PAY FOR THE VERY BASICS LIKE FOOD, HOUSING, MEDICAL CARE, AND HEATING?: NOT HARD AT ALL

## 2025-01-06 ASSESSMENT — PATIENT HEALTH QUESTIONNAIRE - PHQ9
SUM OF ALL RESPONSES TO PHQ QUESTIONS 1-9: 0
1. LITTLE INTEREST OR PLEASURE IN DOING THINGS: NOT AT ALL
2. FEELING DOWN, DEPRESSED OR HOPELESS: NOT AT ALL
SUM OF ALL RESPONSES TO PHQ9 QUESTIONS 1 & 2: 0
SUM OF ALL RESPONSES TO PHQ QUESTIONS 1-9: 0

## 2025-01-06 NOTE — PROGRESS NOTES
were discussed with patient  Patient Labs were reviewed and or requested:  Patient Past Records were reviewed and or requested    Viktor Yang M.D.    There are no Patient Instructions on file for this visit.    Please note that this dictation was completed with Dragon and ALEX, artificial intelligence software.  Quite often unanticipated grammatical, syntax, homophones, and other interpretive errors are inadvertently transcribed by the computer software.  Please disregard these errors.  Please excuse any errors that have escaped final proofreading.  Thank you.         The patient (or guardian, if applicable) and other individuals in attendance with the patient were advised that Artificial Intelligence will be utilized during this visit to record and process the conversation to generate a clinical note. The patient (or guardian, if applicable) and other individuals in attendance at the appointment consented to the use of AI, including the recording.      An electronic signature was used to authenticate this note.    --Viktor Yang MD

## 2025-01-14 DIAGNOSIS — R35.1 NOCTURIA: ICD-10-CM

## 2025-01-14 DIAGNOSIS — N52.9 MALE ERECTILE DYSFUNCTION, UNSPECIFIED: ICD-10-CM

## 2025-01-15 ENCOUNTER — TELEPHONE (OUTPATIENT)
Facility: CLINIC | Age: 60
End: 2025-01-15

## 2025-01-15 RX ORDER — SILDENAFIL 100 MG/1
100 TABLET, FILM COATED ORAL PRN
Qty: 6 TABLET | Refills: 11 | Status: SHIPPED | OUTPATIENT
Start: 2025-01-15

## 2025-01-15 RX ORDER — TAMSULOSIN HYDROCHLORIDE 0.4 MG/1
CAPSULE ORAL DAILY
Qty: 90 CAPSULE | Refills: 1 | Status: SHIPPED | OUTPATIENT
Start: 2025-01-15

## 2025-01-15 NOTE — TELEPHONE ENCOUNTER
Spoke with pt pharmacy Med approved 12/30/24-12/30/27- pt picked up scribe on 1/7/25 per pharmacy.

## 2025-01-15 NOTE — TELEPHONE ENCOUNTER
Pt wants you to work him in ? He just had a VC on 01/06/25 Pt has some of his meds, he needs a PA for his methylphenidate (METADATE CD) 40 MG extended release capsule   Which I have sent to [facility]

## 2025-01-27 ENCOUNTER — OFFICE VISIT (OUTPATIENT)
Facility: CLINIC | Age: 60
End: 2025-01-27
Payer: COMMERCIAL

## 2025-01-27 VITALS
RESPIRATION RATE: 16 BRPM | HEIGHT: 71 IN | OXYGEN SATURATION: 97 % | WEIGHT: 309 LBS | BODY MASS INDEX: 43.26 KG/M2 | DIASTOLIC BLOOD PRESSURE: 89 MMHG | TEMPERATURE: 97.9 F | SYSTOLIC BLOOD PRESSURE: 123 MMHG | HEART RATE: 75 BPM

## 2025-01-27 DIAGNOSIS — D47.2 POEMS SYNDROME: ICD-10-CM

## 2025-01-27 DIAGNOSIS — G62.9 POEMS SYNDROME: ICD-10-CM

## 2025-01-27 DIAGNOSIS — R23.9 POEMS SYNDROME: ICD-10-CM

## 2025-01-27 DIAGNOSIS — I10 ESSENTIAL HYPERTENSION: Primary | ICD-10-CM

## 2025-01-27 DIAGNOSIS — E34.9 POEMS SYNDROME: ICD-10-CM

## 2025-01-27 DIAGNOSIS — G62.9 NEUROPATHY: ICD-10-CM

## 2025-01-27 PROCEDURE — 3074F SYST BP LT 130 MM HG: CPT | Performed by: FAMILY MEDICINE

## 2025-01-27 PROCEDURE — 99213 OFFICE O/P EST LOW 20 MIN: CPT | Performed by: FAMILY MEDICINE

## 2025-01-27 PROCEDURE — 3079F DIAST BP 80-89 MM HG: CPT | Performed by: FAMILY MEDICINE

## 2025-01-27 SDOH — ECONOMIC STABILITY: FOOD INSECURITY: WITHIN THE PAST 12 MONTHS, YOU WORRIED THAT YOUR FOOD WOULD RUN OUT BEFORE YOU GOT MONEY TO BUY MORE.: NEVER TRUE

## 2025-01-27 SDOH — ECONOMIC STABILITY: FOOD INSECURITY: WITHIN THE PAST 12 MONTHS, THE FOOD YOU BOUGHT JUST DIDN'T LAST AND YOU DIDN'T HAVE MONEY TO GET MORE.: NEVER TRUE

## 2025-01-27 NOTE — PROGRESS NOTES
Chief Complaint   Patient presents with    Medication Refill     Patient presents in office today for med refill of [place] and to sign pain contract.  No concerns.          \"Have you been to the ER, urgent care clinic since your last visit?  Hospitalized since your last visit?\"    NO    “Have you seen or consulted any other health care providers outside our system since your last visit?”    NO               Vicente Blue (:  1965) is a 59 y.o. male, here for evaluation of the following chief complaint(s):  Medication Refill         Assessment & Plan  1. Pain management.  A urine test will be conducted today to ensure compliance with the pain management agreement. The patient has been advised to stay active to manage pain better.    Results    1. Essential hypertension  2. POEMS syndrome  -     Urine Drug Screen; Future  3. Neuropathy  -     Urine Drug Screen; Future    No follow-ups on file.       Subjective   History of Present Illness  The patient presents for evaluation of pain management.    He has been experiencing severe pain, which he describes as unprecedented in intensity. He reports a slight improvement in his condition when he is at home and engaged in activities, but notes that prolonged sitting exacerbates his discomfort. He expresses a preference for avoiding medication intake to better understand his body's natural responses, but acknowledges that this is not always feasible due to the severity of his pain. He has been using pain medication sparingly, typically one dose in the morning, which he finds effective until it wears off. He is seeking refills for his current medications and has completed the necessary paperwork for his disability claim. He last took his pain medication today.    Supplemental Information  He recently had bronchitis after working on a camper in cold wind. He had asthma as a child.    Review of Systems       Objective   Blood pressure 123/89, pulse 75, temperature 97.9 °F

## 2025-01-27 NOTE — PROGRESS NOTES
Chief Complaint   Patient presents with    Medication Refill     Patient presents in office today for med refill of [place] and to sign pain contract.  No concerns.          \"Have you been to the ER, urgent care clinic since your last visit?  Hospitalized since your last visit?\"    NO    “Have you seen or consulted any other health care providers outside our system since your last visit?”    NO

## 2025-01-28 LAB
AMPHET UR QL SCN: NEGATIVE
BARBITURATES UR QL SCN: NEGATIVE
BENZODIAZ UR QL: NEGATIVE
CANNABINOIDS UR QL SCN: NEGATIVE
COCAINE UR QL SCN: POSITIVE
Lab: ABNORMAL
METHADONE UR QL: NEGATIVE
OPIATES UR QL: NEGATIVE
PCP UR QL: NEGATIVE

## 2025-02-17 ENCOUNTER — TELEPHONE (OUTPATIENT)
Facility: CLINIC | Age: 60
End: 2025-02-17

## 2025-02-17 NOTE — TELEPHONE ENCOUNTER
Patient just came home from vacation and all of his skin around his feet and ankle. His legs are swollen as well. Refused a visit with PA. Patient's phone: [phone]

## 2025-03-14 DIAGNOSIS — N39.41 URGENCY INCONTINENCE: ICD-10-CM

## 2025-03-15 RX ORDER — TOLTERODINE 4 MG/1
CAPSULE, EXTENDED RELEASE ORAL DAILY
Qty: 90 CAPSULE | Refills: 1 | Status: SHIPPED | OUTPATIENT
Start: 2025-03-15

## 2025-03-19 NOTE — PROGRESS NOTES
"Treatment Plan done but not signed at time of office visit due to:  Plan reviewed with the patient during the virtual visit and verbal consent given.    TREATMENT PLAN (Medication Management Only)        [facility]    Name and Date of Birth:  Crispin Fam 66 y.o. 1958  Date of Treatment Plan: March 19, 2025  Diagnosis/Diagnoses:    1. Attention deficit hyperactivity disorder (ADHD), combined type    2. Adjustment disorder with mixed anxiety and depressed mood    3. Primary insomnia      Strengths/Personal Resources for Self-Care: \"supportive family, friends, playing Music, hobbies and access to therapy\".  Area/Areas of need (in own words): ADHD symptoms, insomnia, depression and anxiety  1. Long Term Goal: maintain control of ADHD symptoms and anxiety, prevent depressive sxs and insomnia  Target Date:6 months - 9/19/2025  Person/Persons responsible for completion of goal: Crispin  2.  Short Term Objective (s) - How will we reach this goal?:   A. Provider new recommended medication/dosage changes and/or continue medication(s): continue current medications as prescribed.  B. Attend psychotherapy regularly.  C. N/A.  Target Date:6 months - 9/19/2025  Person/Persons Responsible for Completion of Goal: Crispin  Progress Towards Goals: stable, continuing treatment  Treatment Modality: medication management every 6 months, continue psychotherapy with own therapist  Review due 180 days from date of this plan: 6 months - 9/19/2025  Expected length of service: maintenance  My Physician/PA/NP and I have developed this plan together and I agree to work on the goals and objectives. I understand the treatment goals that were developed for my treatment.      " PA for methylphenidate 40 mg approved from 9/23/21 - 9/22/24,copy faxed to pharmacy & Copy placed in scan folder to be scanned to chart.

## 2025-03-24 NOTE — ED TRIAGE NOTES
Patient noticed blood in stool since yesterday. Patient also had syncopal episode while using restroom before EMS arrived. Patient was hypotensive on arrival of EMS. Patient had syncopal episode x2 upon standing with EMS. Blood glucose read low on glucometer, patient was given 250 bag of D10 and then glucose read 208. Patient has visible black stool with foul odor on bottom and down legs.
no

## 2025-04-05 DIAGNOSIS — N52.9 MALE ERECTILE DYSFUNCTION, UNSPECIFIED: ICD-10-CM

## 2025-04-05 DIAGNOSIS — F90.9 ATTENTION DEFICIT HYPERACTIVITY DISORDER (ADHD), UNSPECIFIED ADHD TYPE: ICD-10-CM

## 2025-04-07 RX ORDER — METHYLPHENIDATE HYDROCHLORIDE 40 MG/1
40 CAPSULE, EXTENDED RELEASE ORAL DAILY
Qty: 30 CAPSULE | Refills: 0 | OUTPATIENT
Start: 2025-04-07 | End: 2025-05-06

## 2025-04-07 RX ORDER — SILDENAFIL 100 MG/1
100 TABLET, FILM COATED ORAL PRN
Qty: 6 TABLET | Refills: 11 | Status: SHIPPED | OUTPATIENT
Start: 2025-04-07

## (undated) DEVICE — BLUNTFILL: Brand: MONOJECT

## (undated) DEVICE — BITEBLOCK ENDOSCP 60FR MAXI WHT POLYETH STURDY W/ VELC WVN

## (undated) DEVICE — FORCEPS BX L240CM JAW DIA2.8MM L CAP W/ NDL MIC MESH TOOTH

## (undated) DEVICE — 1200 GUARD II KIT W/5MM TUBE W/O VAC TUBE: Brand: GUARDIAN

## (undated) DEVICE — KIT COLON W/ 1.1OZ LUB AND 2 END

## (undated) DEVICE — BLUNTFILL WITH FILTER: Brand: MONOJECT

## (undated) DEVICE — ELECTRODE,RADIOTRANSLUCENT,FOAM,3PK: Brand: MEDLINE

## (undated) DEVICE — IV STRT KT 3282] LSL INDUSTRIES INC]

## (undated) DEVICE — SYRINGE MEDICAL 3ML CLEAR PLASTIC STANDARD NON CONTROL LUERLOCK TIP DISPOSABLE

## (undated) DEVICE — CANNULA CUSH AD W/ 14FT TBG

## (undated) DEVICE — SET ADMIN 16ML TBNG L100IN 2 Y INJ SITE IV PIGGY BK DISP (ORDER IN MULIPLES OF 48)

## (undated) DEVICE — SOLIDIFIER FLD 2OZ 1500CC N DISINF IN BTL DISP SAFESORB

## (undated) DEVICE — MOUTHPIECE ENDOSCP 20X27MM --

## (undated) DEVICE — CATHETER IV 22GA L1IN OD0.8382-0.9144MM ID0.6096-0.6858MM 382523

## (undated) DEVICE — SYRINGE MED 5ML STD CLR PLAS LUERLOCK TIP N CTRL DISP

## (undated) DEVICE — SET GRAV CK VLV NEEDLESS ST 3 GANGED 4WAY STPCOCK HI FLO 10

## (undated) DEVICE — THE ENDO CARRY-ON PROCEDURE KIT CONTAINS ALL OF THE SUPPLIES AND INFECTION PREVENTION PRODUCTS NEEDED FOR ENDOSCOPIC PROCEDURES: Brand: ENDO CARRY-ON PROCEDURE KIT

## (undated) DEVICE — 3M™ CUROS™ DISINFECTING CAP FOR NEEDLELESS CONNECTORS 270/CARTON 20 CARTONS/CASE CFF1-270: Brand: CUROS™

## (undated) DEVICE — CANNULA NSL O2 AD 7 FT END-TIDAL CARBON DIOX VENTFLO